# Patient Record
Sex: FEMALE | Race: WHITE | NOT HISPANIC OR LATINO | Employment: OTHER | URBAN - METROPOLITAN AREA
[De-identification: names, ages, dates, MRNs, and addresses within clinical notes are randomized per-mention and may not be internally consistent; named-entity substitution may affect disease eponyms.]

---

## 2019-04-06 ENCOUNTER — OFFICE VISIT (OUTPATIENT)
Dept: NEUROSURGERY | Facility: CLINIC | Age: 69
End: 2019-04-06

## 2019-04-06 VITALS — HEIGHT: 64 IN | WEIGHT: 174 LBS | BODY MASS INDEX: 29.71 KG/M2 | TEMPERATURE: 97.9 F

## 2019-04-06 DIAGNOSIS — M53.9 MULTILEVEL DEGENERATIVE DISC DISEASE: ICD-10-CM

## 2019-04-06 DIAGNOSIS — M54.16 LUMBAR RADICULOPATHY: Primary | ICD-10-CM

## 2019-04-06 DIAGNOSIS — M25.511 PAIN IN JOINT OF RIGHT SHOULDER: ICD-10-CM

## 2019-04-06 PROCEDURE — 99203 OFFICE O/P NEW LOW 30 MIN: CPT | Performed by: NEUROLOGICAL SURGERY

## 2019-04-06 RX ORDER — LATANOPROST 50 UG/ML
SOLUTION/ DROPS OPHTHALMIC NIGHTLY
COMMUNITY
Start: 2019-03-08 | End: 2021-10-20

## 2019-04-06 RX ORDER — OMEPRAZOLE 20 MG/1
20 CAPSULE, DELAYED RELEASE ORAL DAILY
COMMUNITY
Start: 2019-03-02 | End: 2023-01-13 | Stop reason: ALTCHOICE

## 2019-04-06 RX ORDER — BENAZEPRIL HYDROCHLORIDE 40 MG/1
40 TABLET, FILM COATED ORAL DAILY
COMMUNITY
Start: 2019-02-27

## 2019-04-06 RX ORDER — HYDROCHLOROTHIAZIDE 25 MG/1
25 TABLET ORAL DAILY
COMMUNITY
Start: 2019-03-02 | End: 2021-07-09

## 2019-04-06 RX ORDER — BACLOFEN 10 MG/1
10 TABLET ORAL 3 TIMES DAILY PRN
COMMUNITY
Start: 2018-12-31

## 2019-04-06 RX ORDER — METOPROLOL SUCCINATE 100 MG/1
100 TABLET, EXTENDED RELEASE ORAL DAILY
COMMUNITY
Start: 2019-03-02

## 2019-04-06 RX ORDER — PRAVASTATIN SODIUM 20 MG
20 TABLET ORAL DAILY
COMMUNITY
Start: 2019-02-27 | End: 2020-09-23

## 2019-04-06 RX ORDER — ASPIRIN 81 MG/1
81 TABLET ORAL DAILY
COMMUNITY
Start: 2019-01-24

## 2019-04-06 RX ORDER — AMLODIPINE BESYLATE 10 MG/1
10 TABLET ORAL DAILY
COMMUNITY
Start: 2019-03-02

## 2019-04-06 NOTE — PROGRESS NOTES
Patient: Eneida Bartholomew  : 1950    Primary Care Provider: Cari Rodriguez MD    Requesting Provider: As above        History    Chief Complaint:   1.  Back and right leg pain with sensory alteration.  2.  Right shoulder pain.  3.  History of ventriculomegaly.    History of Present Illness: Ms. Bartholomew is a 68-year-old retired  who 16 months ago awoke with right flank pain that extends into the right lateral calf.  She has been treated with ice and ibuprofen.  She has done physical therapy.  She hasseen 2 orthopedic doctors.  She has had multiple injections.  She has no left leg symptoms.  Beginning in March of this year she began experiencing pain in her jaw that extends all the way to her foot.  She complains of right shoulder pain that occurs when she uses the right arm.  She has no arm pain.  She denies bowel or bladder dysfunction although she has been recently treated for a urinary tract infection.  She has a history of ventriculomegaly.  Apparently studies were performed up to 15 years ago.  She complains of numbness in her right shin.  The worst of her symptoms involves the back and the right leg.  She has no memory or gait difficulty.    Review of Systems   Constitutional: Positive for activity change, appetite change, diaphoresis and fatigue. Negative for chills, fever and unexpected weight change.   HENT: Positive for tinnitus. Negative for congestion, dental problem, drooling, ear discharge, ear pain, facial swelling, hearing loss, mouth sores, nosebleeds, postnasal drip, rhinorrhea, sinus pressure, sneezing, sore throat, trouble swallowing and voice change.    Eyes: Positive for photophobia and itching. Negative for pain, discharge, redness and visual disturbance.   Respiratory: Negative for apnea, cough, choking, chest tightness, shortness of breath, wheezing and stridor.    Cardiovascular: Positive for leg swelling. Negative for chest pain and palpitations.  "  Gastrointestinal: Negative for abdominal distention, abdominal pain, anal bleeding, blood in stool, constipation, diarrhea, nausea, rectal pain and vomiting.   Endocrine: Positive for heat intolerance. Negative for cold intolerance, polydipsia, polyphagia and polyuria.   Genitourinary: Negative for decreased urine volume, difficulty urinating, dysuria, enuresis, flank pain, frequency, genital sores, hematuria and urgency.   Musculoskeletal: Positive for arthralgias, back pain, gait problem and myalgias. Negative for joint swelling, neck pain and neck stiffness.   Skin: Negative for color change, pallor, rash and wound.   Allergic/Immunologic: Positive for food allergies. Negative for environmental allergies and immunocompromised state.   Neurological: Positive for weakness and numbness. Negative for dizziness, tremors, seizures, syncope, facial asymmetry, speech difficulty, light-headedness and headaches.   Hematological: Negative for adenopathy. Bruises/bleeds easily.   Psychiatric/Behavioral: Negative for agitation, behavioral problems, confusion, decreased concentration, dysphoric mood, hallucinations, self-injury, sleep disturbance and suicidal ideas. The patient is not nervous/anxious and is not hyperactive.        The patient's past medical history, past surgical history, family history, and social history have been reviewed at length in the electronic medical record.    Physical Exam:   Temp 97.9 °F (36.6 °C) (Temporal)   Ht 162.6 cm (64\")   Wt 78.9 kg (174 lb)   BMI 29.87 kg/m²   CONSTITUTIONAL: Patient is well-nourished, pleasant and appears stated age.  CV: Heart regular rate and rhythm without murmur, rub, or gallop.  PULMONARY: Lungs are clear to ascultation.  MUSCULOSKELETAL:  Neck tenderness to palpation is not observed.   ROM in neck is normal.  Ranging the right shoulder induces right shoulder pain.  Straight leg raising is negative.  Pradeep signs are negative.  NEUROLOGICAL:  Orientation, " memory, attention span, language function, and cognition have been examined and are intact.  Strength is intact in the upper and lower extremities to direct testing.  Muscle tone is normal throughout.  Station and gait are methodical and mildly limping.  Sensation is intact to light touch testing throughout except in the right anterior shin where it is decreased.  Deep tendon reflexes are 2+ and symmetrical except the right knee reflex which is trace.  Luke's Sign is negative bilaterally. No clonus is elicited.  Coordination is intact.      Medical Decision Making    Data Review:   MRI of the lumbar spine dated 5/2/18 demonstrates degenerative disc disease and a generous left L2-3 disc herniation.  A new lumbar MRI dated 3/27/19 demonstrates some resolution of the above-noted L2-3 process.  There is some disc bulging into the foramen on the right at L4-5 that could compromise the L4 nerve root.  MRI of the cervical spine dated 3/27/19 demonstrates a low-grade offset of C3 on C4.  There is reversal of the normal lordosis with broad-based osteophyte at C4-5 and C5-6.  There is no cord compromise.  Brain MRI dated 3/27/19 demonstrates ventriculomegaly.    Diagnosis:   1.  Right L4 radiculopathy.  2.  Primary right shoulder dysfunction.  3.  Asymptomatic ventriculomegaly.    Treatment Options:   I am going to set up electrodiagnostic studies of the right lower extremity as well as a CT myelogram.  She may require surgical intervention on the right at L4-5.       Diagnosis Plan   1. Lumbar radiculopathy     2. Multilevel degenerative disc disease     3. Pain in joint of right shoulder             I, Dr. Borja, personally performed the services described in the documentation, as scribed in my presence, and it is both accurate and complete.  Scribed for Yoni Borja MD by Luis E Broderick CMA on 04/06/2019 at 9:44 AM

## 2019-04-08 DIAGNOSIS — M54.16 LUMBAR RADICULOPATHY: Primary | ICD-10-CM

## 2019-04-15 RX ORDER — VITAMIN E 268 MG
400 CAPSULE ORAL DAILY
COMMUNITY

## 2019-04-15 NOTE — SIGNIFICANT NOTE
Message left for pt as was unable to reach her to review history, allergies, medications or answer any questions that she may have had regarding procedure scheduled for tomorrow morning.  Left instructions regarding arrival time to registration, clear liquids after midnight,  needed, and to bring medications with her.

## 2019-04-16 ENCOUNTER — HOSPITAL ENCOUNTER (OUTPATIENT)
Dept: CT IMAGING | Facility: HOSPITAL | Age: 69
Discharge: HOME | End: 2019-04-16

## 2019-04-16 ENCOUNTER — OFFICE VISIT (OUTPATIENT)
Dept: NEUROSURGERY | Facility: CLINIC | Age: 69
End: 2019-04-16

## 2019-04-16 ENCOUNTER — HOSPITAL ENCOUNTER (OUTPATIENT)
Dept: NEUROLOGY | Facility: HOSPITAL | Age: 69
Discharge: HOME | End: 2019-04-16
Admitting: NEUROLOGICAL SURGERY

## 2019-04-16 ENCOUNTER — HOSPITAL ENCOUNTER (OUTPATIENT)
Dept: GENERAL RADIOLOGY | Facility: HOSPITAL | Age: 69
Discharge: HOME | End: 2019-04-16

## 2019-04-16 ENCOUNTER — PREP FOR SURGERY (OUTPATIENT)
Dept: OTHER | Facility: HOSPITAL | Age: 69
End: 2019-04-16

## 2019-04-16 VITALS
TEMPERATURE: 98 F | HEIGHT: 64 IN | OXYGEN SATURATION: 97 % | DIASTOLIC BLOOD PRESSURE: 66 MMHG | HEART RATE: 77 BPM | BODY MASS INDEX: 29.71 KG/M2 | WEIGHT: 174 LBS | RESPIRATION RATE: 18 BRPM | SYSTOLIC BLOOD PRESSURE: 123 MMHG

## 2019-04-16 VITALS — WEIGHT: 174 LBS | HEIGHT: 64 IN | BODY MASS INDEX: 29.71 KG/M2 | TEMPERATURE: 98 F

## 2019-04-16 DIAGNOSIS — M48.062 LUMBAR STENOSIS WITH NEUROGENIC CLAUDICATION: Primary | ICD-10-CM

## 2019-04-16 DIAGNOSIS — M54.16 LUMBAR RADICULOPATHY: ICD-10-CM

## 2019-04-16 DIAGNOSIS — M54.16 LUMBAR RADICULOPATHY: Primary | ICD-10-CM

## 2019-04-16 DIAGNOSIS — M53.9 MULTILEVEL DEGENERATIVE DISC DISEASE: ICD-10-CM

## 2019-04-16 PROCEDURE — 62304 MYELOGRAPHY LUMBAR INJECTION: CPT

## 2019-04-16 PROCEDURE — 99214 OFFICE O/P EST MOD 30 MIN: CPT | Performed by: NEUROLOGICAL SURGERY

## 2019-04-16 PROCEDURE — 95886 MUSC TEST DONE W/N TEST COMP: CPT

## 2019-04-16 PROCEDURE — 95909 NRV CNDJ TST 5-6 STUDIES: CPT

## 2019-04-16 PROCEDURE — 72240 MYELOGRAPHY NECK SPINE: CPT

## 2019-04-16 PROCEDURE — 72120 X-RAY BEND ONLY L-S SPINE: CPT

## 2019-04-16 PROCEDURE — 0 IOPAMIDOL 41 % SOLUTION: Performed by: NEUROLOGICAL SURGERY

## 2019-04-16 PROCEDURE — 72132 CT LUMBAR SPINE W/DYE: CPT

## 2019-04-16 RX ORDER — LIDOCAINE HYDROCHLORIDE 10 MG/ML
5 INJECTION, SOLUTION INFILTRATION; PERINEURAL ONCE
Status: COMPLETED | OUTPATIENT
Start: 2019-04-16 | End: 2019-04-16

## 2019-04-16 RX ORDER — OXYCODONE HCL 10 MG/1
10 TABLET, FILM COATED, EXTENDED RELEASE ORAL ONCE
Status: CANCELLED | OUTPATIENT
Start: 2019-04-16 | End: 2019-04-16

## 2019-04-16 RX ORDER — CEFAZOLIN SODIUM 2 G/100ML
2 INJECTION, SOLUTION INTRAVENOUS ONCE
Status: CANCELLED | OUTPATIENT
Start: 2019-04-16 | End: 2019-04-16

## 2019-04-16 RX ORDER — IBUPROFEN 800 MG/1
800 TABLET ORAL ONCE
Status: CANCELLED | OUTPATIENT
Start: 2019-04-16 | End: 2019-04-16

## 2019-04-16 RX ORDER — ACETAMINOPHEN 500 MG
1000 TABLET ORAL ONCE
Status: CANCELLED | OUTPATIENT
Start: 2019-04-16 | End: 2019-04-16

## 2019-04-16 RX ORDER — PROMETHAZINE HYDROCHLORIDE 25 MG/ML
12.5 INJECTION, SOLUTION INTRAMUSCULAR; INTRAVENOUS ONCE AS NEEDED
Status: DISCONTINUED | OUTPATIENT
Start: 2019-04-16 | End: 2019-04-17 | Stop reason: HOSPADM

## 2019-04-16 RX ORDER — ONDANSETRON 4 MG/1
4 TABLET, FILM COATED ORAL ONCE AS NEEDED
Status: DISCONTINUED | OUTPATIENT
Start: 2019-04-16 | End: 2019-04-17 | Stop reason: HOSPADM

## 2019-04-16 RX ORDER — PROMETHAZINE HYDROCHLORIDE 25 MG/1
25 TABLET ORAL ONCE AS NEEDED
Status: DISCONTINUED | OUTPATIENT
Start: 2019-04-16 | End: 2019-04-17 | Stop reason: HOSPADM

## 2019-04-16 RX ORDER — HYDROCODONE BITARTRATE AND ACETAMINOPHEN 7.5; 325 MG/1; MG/1
1 TABLET ORAL 2 TIMES DAILY PRN
Qty: 50 TABLET | Refills: 0 | Status: SHIPPED | OUTPATIENT
Start: 2019-04-16 | End: 2019-05-05 | Stop reason: HOSPADM

## 2019-04-16 RX ORDER — FAMOTIDINE 20 MG/1
20 TABLET, FILM COATED ORAL
Status: CANCELLED | OUTPATIENT
Start: 2019-04-16

## 2019-04-16 RX ADMIN — LIDOCAINE HYDROCHLORIDE 5 ML: 10 INJECTION, SOLUTION INFILTRATION; PERINEURAL at 07:14

## 2019-04-16 RX ADMIN — IOPAMIDOL 20 ML: 408 INJECTION, SOLUTION INTRATHECAL at 07:15

## 2019-04-16 NOTE — PROGRESS NOTES
Patient: Eneida Bartholomew  : 1950    Primary Care Provider: Cari Rodriguez MD    Requesting Provider: As above        History    Chief Complaint:   1.  Back and right leg pain with sensory alteration.  2.  Right shoulder pain.  3.  History of ventriculomegaly.    History of Present Illness: Ms. Bartholomew is a 68-year-old retired  who 16 months ago awoke with right flank pain that extends into the right lateral calf.  She has been treated with ice and ibuprofen.  She has done physical therapy.  She hasseen 2 orthopedic doctors.  She has had multiple injections.  She has no left leg symptoms.  Beginning in March of this year she began experiencing pain in her jaw that extends all the way to her foot.  She complains of right shoulder pain that occurs when she uses the right arm.  She has no arm pain.  She denies bowel or bladder dysfunction although she has been recently treated for a urinary tract infection.  She has a history of ventriculomegaly.  Apparently studies were performed up to 15 years ago.  She complains of numbness in her right shin.  The worst of her symptoms involves the back and the right leg.    Symptoms are worse when up on her feet.  She has no memory or gait difficulty.    Review of Systems   Constitutional: Positive for activity change, appetite change, diaphoresis and fatigue. Negative for chills, fever and unexpected weight change.   HENT: Positive for tinnitus. Negative for congestion, dental problem, drooling, ear discharge, ear pain, facial swelling, hearing loss, mouth sores, nosebleeds, postnasal drip, rhinorrhea, sinus pressure, sneezing, sore throat, trouble swallowing and voice change.    Eyes: Positive for photophobia and itching. Negative for pain, discharge, redness and visual disturbance.   Respiratory: Negative for apnea, cough, choking, chest tightness, shortness of breath, wheezing and stridor.    Cardiovascular: Positive for leg swelling.  "Negative for chest pain and palpitations.   Gastrointestinal: Negative for abdominal distention, abdominal pain, anal bleeding, blood in stool, constipation, diarrhea, nausea, rectal pain and vomiting.   Endocrine: Positive for heat intolerance. Negative for cold intolerance, polydipsia, polyphagia and polyuria.   Genitourinary: Negative for decreased urine volume, difficulty urinating, dysuria, enuresis, flank pain, frequency, genital sores, hematuria and urgency.   Musculoskeletal: Positive for arthralgias, back pain, gait problem and myalgias. Negative for joint swelling, neck pain and neck stiffness.   Skin: Negative for color change, pallor, rash and wound.   Allergic/Immunologic: Positive for food allergies. Negative for environmental allergies and immunocompromised state.   Neurological: Positive for weakness and numbness. Negative for dizziness, tremors, seizures, syncope, facial asymmetry, speech difficulty, light-headedness and headaches.   Hematological: Negative for adenopathy. Bruises/bleeds easily.   Psychiatric/Behavioral: Negative for agitation, behavioral problems, confusion, decreased concentration, dysphoric mood, hallucinations, self-injury, sleep disturbance and suicidal ideas. The patient is not nervous/anxious and is not hyperactive.        The patient's past medical history, past surgical history, family history, and social history have been reviewed at length in the electronic medical record.    Physical Exam:   Temp 98 °F (36.7 °C) (Temporal)   Ht 162.6 cm (64\")   Wt 78.9 kg (174 lb)   BMI 29.87 kg/m²   MUSCULOSKELETAL:  Straight leg raising is negative.  Pradeep's Sign is negative.  ROM in back normal.  Tenderness in the back to palpation is not observed.  NEUROLOGICAL:  Strength is intact in the lower extremities to direct testing.  Muscle tone is normal throughout.  Station and gait are normal.  Right knee reflex is trace.  Sensation is diminished to light touch testing in the right " anterior shin.      Medical Decision Making    Data Review:   MRI of the lumbar spine dated 5/2/18 demonstrates degenerative disc disease and a generous left L2-3 disc herniation.  A new lumbar MRI dated 3/27/19 demonstrates some resolution of the above-noted L2-3 process.  There is some disc bulging into the foramen on the right at L4-5 that could compromise the L4 nerve root.  MRI of the cervical spine dated 3/27/19 demonstrates a low-grade offset of C3 on C4.  There is reversal of the normal lordosis with broad-based osteophyte at C4-5 and C5-6.  There is no cord compromise.  Brain MRI dated 3/27/19 demonstrates ventriculomegaly.    Electrodiagnostic studies suggest a chronic L4 radiculopathy on the right.    CT myelogram demonstrates some scalloping on the right at L3-4 and L4-5.  There is recess narrowing on the right at L3-4.  There is substantial disc space collapse at L4-5 with by foraminal narrowing, right greater than left.    Diagnosis:   The patient's main complaint is right leg pain that likely reflects an L4 radiculopathy.  This could be emanating from the L3-4 and/or L4-5 level (foraminally).    Treatment Options:   Given her severe symptoms I have recommended L4-5 PLIF as well as right L3-4 laminotomy and foraminotomy.  This will likely help with her symptoms although it may not completely eradicate them.       Diagnosis Plan   1. Lumbar radiculopathy     2. Multilevel degenerative disc disease         Scribed for Yoni Borja MD by Vernell Becker CMA on 04/16/2019 at 4:57 PM      I, Dr. Borja, personally performed the services described in the documentation, as scribed in my presence, and it is both accurate and complete.

## 2019-04-16 NOTE — POST-PROCEDURE NOTE
MYELOGRAM PROCEDURE NOTE  Neurosurgery    Patient: Eneida Bartholomew  : 1950      PreOp Diagnosis: Lumbar radiculopathy    PostOp Diagnosis: Same    Procedure: Lumbar myelogram    Surgeon: Jonh    Anesthesia: 1% lidocaine    Technique:   Spinal needle: 22 gauge   Contrast: Isovue 200 (20ml)   Injection site: R L3    EBL: Trace    Specimens removed: None    Complication: None        Yoni Borja MD  19  7:27 AM

## 2019-04-16 NOTE — H&P
Patient: Eneida Bartholomew  : 1950     Primary Care Provider: Cari Rodriguez MD     Requesting Provider: As above           History     Chief Complaint:   1.  Back and right leg pain with sensory alteration.  2.  Right shoulder pain.  3.  History of ventriculomegaly.     History of Present Illness: Ms. Bartholomew is a 68-year-old retired  who 16 months ago awoke with right flank pain that extends into the right lateral calf.  She has been treated with ice and ibuprofen.  She has done physical therapy.  She hasseen 2 orthopedic doctors.  She has had multiple injections.  She has no left leg symptoms.  Beginning in March of this year she began experiencing pain in her jaw that extends all the way to her foot.  She complains of right shoulder pain that occurs when she uses the right arm.  She has no arm pain.  She denies bowel or bladder dysfunction although she has been recently treated for a urinary tract infection.  She has a history of ventriculomegaly.  Apparently studies were performed up to 15 years ago.  She complains of numbness in her right shin.  The worst of her symptoms involves the back and the right leg.    Symptoms are worse when up on her feet.  She has no memory or gait difficulty.     Review of Systems   Constitutional: Positive for activity change, appetite change, diaphoresis and fatigue. Negative for chills, fever and unexpected weight change.   HENT: Positive for tinnitus. Negative for congestion, dental problem, drooling, ear discharge, ear pain, facial swelling, hearing loss, mouth sores, nosebleeds, postnasal drip, rhinorrhea, sinus pressure, sneezing, sore throat, trouble swallowing and voice change.    Eyes: Positive for photophobia and itching. Negative for pain, discharge, redness and visual disturbance.   Respiratory: Negative for apnea, cough, choking, chest tightness, shortness of breath, wheezing and stridor.    Cardiovascular: Positive for leg  swelling. Negative for chest pain and palpitations.   Gastrointestinal: Negative for abdominal distention, abdominal pain, anal bleeding, blood in stool, constipation, diarrhea, nausea, rectal pain and vomiting.   Endocrine: Positive for heat intolerance. Negative for cold intolerance, polydipsia, polyphagia and polyuria.   Genitourinary: Negative for decreased urine volume, difficulty urinating, dysuria, enuresis, flank pain, frequency, genital sores, hematuria and urgency.   Musculoskeletal: Positive for arthralgias, back pain, gait problem and myalgias. Negative for joint swelling, neck pain and neck stiffness.   Skin: Negative for color change, pallor, rash and wound.   Allergic/Immunologic: Positive for food allergies. Negative for environmental allergies and immunocompromised state.   Neurological: Positive for weakness and numbness. Negative for dizziness, tremors, seizures, syncope, facial asymmetry, speech difficulty, light-headedness and headaches.   Hematological: Negative for adenopathy. Bruises/bleeds easily.   Psychiatric/Behavioral: Negative for agitation, behavioral problems, confusion, decreased concentration, dysphoric mood, hallucinations, self-injury, sleep disturbance and suicidal ideas. The patient is not nervous/anxious and is not hyperactive.          The patient's past medical history, past surgical history, family history, and social history have been reviewed at length in the electronic medical record.     Past Medical History:   Diagnosis Date   • Arthritis    • Coronary artery disease    • GERD (gastroesophageal reflux disease)    • Glaucoma    • Hypertension      Past Surgical History:   Procedure Laterality Date   • BREAST BIOPSY Right    • BUNIONECTOMY Bilateral     x4   • CATARACT EXTRACTION EXTRACAPSULAR W/ INTRAOCULAR LENS IMPLANTATION Bilateral    • CHOLECYSTECTOMY     • CORONARY ANGIOPLASTY WITH STENT PLACEMENT       Family History   Problem Relation Age of Onset   • Cancer  "Mother    • Heart disease Father      Social History     Socioeconomic History   • Marital status:      Spouse name: Claude   • Number of children: Not on file   • Years of education: Not on file   • Highest education level: Not on file   Tobacco Use   • Smoking status: Never Smoker   • Smokeless tobacco: Never Used   Substance and Sexual Activity   • Alcohol use: No     Frequency: Never   • Drug use: No   • Sexual activity: Defer   Social History Narrative    Sister, Cira will be driving.    Pt lives in [place] with spouse     Allergies   Allergen Reactions   • Prednisone Shortness Of Breath     And nervous   • Erythromycin Rash   • Sulfa Antibiotics Rash       Physical Exam:   Temp 98 °F (36.7 °C) (Temporal)   Ht 162.6 cm (64\")   Wt 78.9 kg (174 lb)   BMI 29.87 kg/m²   MUSCULOSKELETAL:  Straight leg raising is negative.  Pradeep's Sign is negative.  ROM in back normal.  Tenderness in the back to palpation is not observed.  NEUROLOGICAL:  Strength is intact in the lower extremities to direct testing.  Muscle tone is normal throughout.  Station and gait are normal.  Right knee reflex is trace.  Sensation is diminished to light touch testing in the right anterior shin.        Medical Decision Making     Data Review:   MRI of the lumbar spine dated 5/2/18 demonstrates degenerative disc disease and a generous left L2-3 disc herniation.  A new lumbar MRI dated 3/27/19 demonstrates some resolution of the above-noted L2-3 process.  There is some disc bulging into the foramen on the right at L4-5 that could compromise the L4 nerve root.  MRI of the cervical spine dated 3/27/19 demonstrates a low-grade offset of C3 on C4.  There is reversal of the normal lordosis with broad-based osteophyte at C4-5 and C5-6.  There is no cord compromise.  Brain MRI dated 3/27/19 demonstrates ventriculomegaly.     Electrodiagnostic studies suggest a chronic L4 radiculopathy on the right.     CT myelogram demonstrates some " scalloping on the right at L3-4 and L4-5.  There is recess narrowing on the right at L3-4.  There is substantial disc space collapse at L4-5 with by foraminal narrowing, right greater than left.     Diagnosis:   The patient's main complaint is right leg pain that likely reflects an L4 radiculopathy.  This could be emanating from the L3-4 and/or L4-5 level (foraminally).     Treatment Options:   Given her severe symptoms I have recommended L4-5 PLIF as well as right L3-4 laminotomy and foraminotomy.  This will likely help with her symptoms although it may not completely eradicate them.          Diagnosis Plan   1. Lumbar radiculopathy      2. Multilevel degenerative disc disease

## 2019-04-17 ENCOUNTER — TELEPHONE (OUTPATIENT)
Dept: INFUSION THERAPY | Facility: HOSPITAL | Age: 69
End: 2019-04-17

## 2019-04-17 PROBLEM — M48.062 LUMBAR STENOSIS WITH NEUROGENIC CLAUDICATION: Status: ACTIVE | Noted: 2019-04-17

## 2019-04-29 ENCOUNTER — APPOINTMENT (OUTPATIENT)
Dept: PREADMISSION TESTING | Facility: HOSPITAL | Age: 69
End: 2019-04-29

## 2019-04-29 VITALS — WEIGHT: 173.28 LBS | HEIGHT: 64 IN | BODY MASS INDEX: 29.58 KG/M2

## 2019-04-29 LAB
DEPRECATED RDW RBC AUTO: 39.9 FL (ref 37–54)
ERYTHROCYTE [DISTWIDTH] IN BLOOD BY AUTOMATED COUNT: 12.6 % (ref 12.3–15.4)
HBA1C MFR BLD: 5 % (ref 4.8–5.6)
HCT VFR BLD AUTO: 41.1 % (ref 34–46.6)
HGB BLD-MCNC: 13.8 G/DL (ref 12–15.9)
MCH RBC QN AUTO: 29.7 PG (ref 26.6–33)
MCHC RBC AUTO-ENTMCNC: 33.6 G/DL (ref 31.5–35.7)
MCV RBC AUTO: 88.6 FL (ref 79–97)
MRSA DNA SPEC QL NAA+PROBE: NEGATIVE
PLATELET # BLD AUTO: 241 10*3/MM3 (ref 140–450)
PMV BLD AUTO: 9.4 FL (ref 6–12)
POTASSIUM BLD-SCNC: 3.6 MMOL/L (ref 3.5–5.2)
RBC # BLD AUTO: 4.64 10*6/MM3 (ref 3.77–5.28)
WBC NRBC COR # BLD: 5.45 10*3/MM3 (ref 3.4–10.8)

## 2019-04-29 PROCEDURE — 87641 MR-STAPH DNA AMP PROBE: CPT | Performed by: ANESTHESIOLOGY

## 2019-04-29 PROCEDURE — 83036 HEMOGLOBIN GLYCOSYLATED A1C: CPT | Performed by: ANESTHESIOLOGY

## 2019-04-29 PROCEDURE — 85027 COMPLETE CBC AUTOMATED: CPT | Performed by: ANESTHESIOLOGY

## 2019-04-29 PROCEDURE — 36415 COLL VENOUS BLD VENIPUNCTURE: CPT

## 2019-04-29 PROCEDURE — 93005 ELECTROCARDIOGRAM TRACING: CPT

## 2019-04-29 PROCEDURE — 93010 ELECTROCARDIOGRAM REPORT: CPT | Performed by: INTERNAL MEDICINE

## 2019-04-29 PROCEDURE — 84132 ASSAY OF SERUM POTASSIUM: CPT | Performed by: ANESTHESIOLOGY

## 2019-05-02 ENCOUNTER — ANESTHESIA EVENT (OUTPATIENT)
Dept: PERIOP | Facility: HOSPITAL | Age: 69
End: 2019-05-02

## 2019-05-02 ENCOUNTER — APPOINTMENT (OUTPATIENT)
Dept: GENERAL RADIOLOGY | Facility: HOSPITAL | Age: 69
End: 2019-05-02

## 2019-05-02 ENCOUNTER — HOSPITAL ENCOUNTER (INPATIENT)
Facility: HOSPITAL | Age: 69
LOS: 3 days | Discharge: HOME | End: 2019-05-05
Attending: NEUROLOGICAL SURGERY | Admitting: NEUROLOGICAL SURGERY

## 2019-05-02 ENCOUNTER — ANESTHESIA (OUTPATIENT)
Dept: PERIOP | Facility: HOSPITAL | Age: 69
End: 2019-05-02

## 2019-05-02 DIAGNOSIS — Z74.09 IMPAIRED FUNCTIONAL MOBILITY, BALANCE, GAIT, AND ENDURANCE: Primary | ICD-10-CM

## 2019-05-02 DIAGNOSIS — Z78.9 IMPAIRED MOBILITY AND ADLS: ICD-10-CM

## 2019-05-02 DIAGNOSIS — Z74.09 IMPAIRED MOBILITY AND ADLS: ICD-10-CM

## 2019-05-02 DIAGNOSIS — M48.062 LUMBAR STENOSIS WITH NEUROGENIC CLAUDICATION: ICD-10-CM

## 2019-05-02 PROCEDURE — 25010000002 PHENYLEPHRINE PER 1 ML: Performed by: NURSE ANESTHETIST, CERTIFIED REGISTERED

## 2019-05-02 PROCEDURE — 22633 ARTHRD CMBN 1NTRSPC LUMBAR: CPT | Performed by: NEUROLOGICAL SURGERY

## 2019-05-02 PROCEDURE — C1713 ANCHOR/SCREW BN/BN,TIS/BN: HCPCS | Performed by: NEUROLOGICAL SURGERY

## 2019-05-02 PROCEDURE — 25010000002 HYDROMORPHONE PER 4 MG: Performed by: ANESTHESIOLOGY

## 2019-05-02 PROCEDURE — 25010000002 NEOSTIGMINE 10 MG/10ML SOLUTION: Performed by: ANESTHESIOLOGY

## 2019-05-02 PROCEDURE — 25010000002 PROPOFOL 10 MG/ML EMULSION: Performed by: NURSE ANESTHETIST, CERTIFIED REGISTERED

## 2019-05-02 PROCEDURE — 0SB20ZZ EXCISION OF LUMBAR VERTEBRAL DISC, OPEN APPROACH: ICD-10-PCS | Performed by: NEUROLOGICAL SURGERY

## 2019-05-02 PROCEDURE — 63047 LAM FACETEC & FORAMOT LUMBAR: CPT | Performed by: PHYSICIAN ASSISTANT

## 2019-05-02 PROCEDURE — 25010000002 FENTANYL CITRATE (PF) 100 MCG/2ML SOLUTION: Performed by: NURSE ANESTHETIST, CERTIFIED REGISTERED

## 2019-05-02 PROCEDURE — 25010000003 CEFAZOLIN IN DEXTROSE 2-4 GM/100ML-% SOLUTION: Performed by: NEUROLOGICAL SURGERY

## 2019-05-02 PROCEDURE — 63047 LAM FACETEC & FORAMOT LUMBAR: CPT | Performed by: NEUROLOGICAL SURGERY

## 2019-05-02 PROCEDURE — 25010000002 VANCOMYCIN 1 G RECONSTITUTED SOLUTION: Performed by: NEUROLOGICAL SURGERY

## 2019-05-02 PROCEDURE — 22633 ARTHRD CMBN 1NTRSPC LUMBAR: CPT | Performed by: PHYSICIAN ASSISTANT

## 2019-05-02 PROCEDURE — 22853 INSJ BIOMECHANICAL DEVICE: CPT | Performed by: PHYSICIAN ASSISTANT

## 2019-05-02 PROCEDURE — 22840 INSERT SPINE FIXATION DEVICE: CPT | Performed by: NEUROLOGICAL SURGERY

## 2019-05-02 PROCEDURE — 22840 INSERT SPINE FIXATION DEVICE: CPT | Performed by: PHYSICIAN ASSISTANT

## 2019-05-02 PROCEDURE — 76000 FLUOROSCOPY <1 HR PHYS/QHP: CPT

## 2019-05-02 PROCEDURE — 25010000002 DEXAMETHASONE PER 1 MG: Performed by: ANESTHESIOLOGY

## 2019-05-02 PROCEDURE — 22853 INSJ BIOMECHANICAL DEVICE: CPT | Performed by: NEUROLOGICAL SURGERY

## 2019-05-02 PROCEDURE — 0SG00AJ FUSION OF LUMBAR VERTEBRAL JOINT WITH INTERBODY FUSION DEVICE, POSTERIOR APPROACH, ANTERIOR COLUMN, OPEN APPROACH: ICD-10-PCS | Performed by: NEUROLOGICAL SURGERY

## 2019-05-02 PROCEDURE — 00QT0ZZ REPAIR SPINAL MENINGES, OPEN APPROACH: ICD-10-PCS | Performed by: NEUROLOGICAL SURGERY

## 2019-05-02 DEVICE — CDH 7226545 LEGACY PEEK  6.5X45
Type: IMPLANTABLE DEVICE | Site: SPINE LUMBAR | Status: FUNCTIONAL
Brand: CD HORIZON® SPINAL SYSTEM

## 2019-05-02 DEVICE — DURAGEN® PLUS DURAL REGENERATION MATRIX, 1 IN X 3 IN (2.5 CM X 7.5 CM)
Type: IMPLANTABLE DEVICE | Site: SPINE LUMBAR | Status: FUNCTIONAL
Brand: DURAGEN® PLUS

## 2019-05-02 DEVICE — SPACER 2990822 CAPSTONE PEEK 08X22
Type: IMPLANTABLE DEVICE | Site: SPINE LUMBAR | Status: FUNCTIONAL
Brand: CAPSTONE® SPINAL SYSTEM

## 2019-05-02 DEVICE — DBM T44145 5CC ORTHOBLEND SMALL DEFGRAFT
Type: IMPLANTABLE DEVICE | Site: SPINE LUMBAR | Status: FUNCTIONAL
Brand: GRAFTON®AND GRAFTON PLUS®DEMINERALIZED BONE MATRIX (DBM)

## 2019-05-02 RX ORDER — SENNA AND DOCUSATE SODIUM 50; 8.6 MG/1; MG/1
1 TABLET, FILM COATED ORAL NIGHTLY PRN
Status: DISCONTINUED | OUTPATIENT
Start: 2019-05-02 | End: 2019-05-05 | Stop reason: HOSPADM

## 2019-05-02 RX ORDER — NEOSTIGMINE METHYLSULFATE 1 MG/ML
INJECTION, SOLUTION INTRAVENOUS AS NEEDED
Status: DISCONTINUED | OUTPATIENT
Start: 2019-05-02 | End: 2019-05-02 | Stop reason: SURG

## 2019-05-02 RX ORDER — FAMOTIDINE 10 MG/ML
20 INJECTION, SOLUTION INTRAVENOUS ONCE
Status: CANCELLED | OUTPATIENT
Start: 2019-05-02 | End: 2019-05-02

## 2019-05-02 RX ORDER — DEXAMETHASONE SODIUM PHOSPHATE 4 MG/ML
INJECTION, SOLUTION INTRA-ARTICULAR; INTRALESIONAL; INTRAMUSCULAR; INTRAVENOUS; SOFT TISSUE AS NEEDED
Status: DISCONTINUED | OUTPATIENT
Start: 2019-05-02 | End: 2019-05-02 | Stop reason: SURG

## 2019-05-02 RX ORDER — HYDROCHLOROTHIAZIDE 25 MG/1
25 TABLET ORAL DAILY
Status: DISCONTINUED | OUTPATIENT
Start: 2019-05-03 | End: 2019-05-05 | Stop reason: HOSPADM

## 2019-05-02 RX ORDER — PRAVASTATIN SODIUM 20 MG
20 TABLET ORAL DAILY
Status: DISCONTINUED | OUTPATIENT
Start: 2019-05-02 | End: 2019-05-05 | Stop reason: HOSPADM

## 2019-05-02 RX ORDER — AMLODIPINE BESYLATE 10 MG/1
10 TABLET ORAL DAILY
Status: DISCONTINUED | OUTPATIENT
Start: 2019-05-03 | End: 2019-05-05 | Stop reason: HOSPADM

## 2019-05-02 RX ORDER — SODIUM CHLORIDE 9 MG/ML
INJECTION, SOLUTION INTRAVENOUS AS NEEDED
Status: DISCONTINUED | OUTPATIENT
Start: 2019-05-02 | End: 2019-05-02 | Stop reason: HOSPADM

## 2019-05-02 RX ORDER — OXYCODONE HYDROCHLORIDE 15 MG/1
15 TABLET, FILM COATED, EXTENDED RELEASE ORAL EVERY 12 HOURS SCHEDULED
Status: DISCONTINUED | OUTPATIENT
Start: 2019-05-02 | End: 2019-05-05 | Stop reason: HOSPADM

## 2019-05-02 RX ORDER — ASPIRIN 81 MG/1
81 TABLET ORAL DAILY
Status: DISCONTINUED | OUTPATIENT
Start: 2019-05-02 | End: 2019-05-05 | Stop reason: HOSPADM

## 2019-05-02 RX ORDER — SODIUM CHLORIDE 0.9 % (FLUSH) 0.9 %
3-10 SYRINGE (ML) INJECTION AS NEEDED
Status: CANCELLED | OUTPATIENT
Start: 2019-05-02

## 2019-05-02 RX ORDER — SODIUM CHLORIDE, SODIUM LACTATE, POTASSIUM CHLORIDE, CALCIUM CHLORIDE 600; 310; 30; 20 MG/100ML; MG/100ML; MG/100ML; MG/100ML
90 INJECTION, SOLUTION INTRAVENOUS CONTINUOUS
Status: DISCONTINUED | OUTPATIENT
Start: 2019-05-02 | End: 2019-05-04

## 2019-05-02 RX ORDER — IBUPROFEN 600 MG/1
600 TABLET ORAL 3 TIMES DAILY
Status: DISCONTINUED | OUTPATIENT
Start: 2019-05-02 | End: 2019-05-05 | Stop reason: HOSPADM

## 2019-05-02 RX ORDER — PROMETHAZINE HYDROCHLORIDE 12.5 MG/1
12.5 SUPPOSITORY RECTAL EVERY 6 HOURS PRN
Status: DISCONTINUED | OUTPATIENT
Start: 2019-05-02 | End: 2019-05-05 | Stop reason: HOSPADM

## 2019-05-02 RX ORDER — ONDANSETRON 2 MG/ML
4 INJECTION INTRAMUSCULAR; INTRAVENOUS EVERY 6 HOURS PRN
Status: DISCONTINUED | OUTPATIENT
Start: 2019-05-02 | End: 2019-05-05 | Stop reason: HOSPADM

## 2019-05-02 RX ORDER — SODIUM CHLORIDE 0.9 % (FLUSH) 0.9 %
1-10 SYRINGE (ML) INJECTION AS NEEDED
Status: DISCONTINUED | OUTPATIENT
Start: 2019-05-02 | End: 2019-05-05 | Stop reason: HOSPADM

## 2019-05-02 RX ORDER — BACLOFEN 10 MG/1
10 TABLET ORAL 3 TIMES DAILY PRN
Status: DISCONTINUED | OUTPATIENT
Start: 2019-05-02 | End: 2019-05-05 | Stop reason: HOSPADM

## 2019-05-02 RX ORDER — MORPHINE SULFATE 2 MG/ML
2 INJECTION, SOLUTION INTRAMUSCULAR; INTRAVENOUS EVERY 4 HOURS PRN
Status: DISCONTINUED | OUTPATIENT
Start: 2019-05-02 | End: 2019-05-05 | Stop reason: HOSPADM

## 2019-05-02 RX ORDER — LATANOPROST 50 UG/ML
1 SOLUTION/ DROPS OPHTHALMIC NIGHTLY
Status: DISCONTINUED | OUTPATIENT
Start: 2019-05-02 | End: 2019-05-05 | Stop reason: HOSPADM

## 2019-05-02 RX ORDER — VANCOMYCIN HYDROCHLORIDE 1 G/20ML
INJECTION, POWDER, LYOPHILIZED, FOR SOLUTION INTRAVENOUS AS NEEDED
Status: DISCONTINUED | OUTPATIENT
Start: 2019-05-02 | End: 2019-05-02 | Stop reason: HOSPADM

## 2019-05-02 RX ORDER — ACETAMINOPHEN 325 MG/1
650 TABLET ORAL 3 TIMES DAILY
Status: DISCONTINUED | OUTPATIENT
Start: 2019-05-02 | End: 2019-05-05 | Stop reason: HOSPADM

## 2019-05-02 RX ORDER — OXYCODONE HCL 10 MG/1
10 TABLET, FILM COATED, EXTENDED RELEASE ORAL ONCE
Status: COMPLETED | OUTPATIENT
Start: 2019-05-02 | End: 2019-05-02

## 2019-05-02 RX ORDER — SODIUM CHLORIDE, SODIUM LACTATE, POTASSIUM CHLORIDE, CALCIUM CHLORIDE 600; 310; 30; 20 MG/100ML; MG/100ML; MG/100ML; MG/100ML
9 INJECTION, SOLUTION INTRAVENOUS CONTINUOUS
Status: DISCONTINUED | OUTPATIENT
Start: 2019-05-02 | End: 2019-05-05 | Stop reason: HOSPADM

## 2019-05-02 RX ORDER — BISACODYL 10 MG
10 SUPPOSITORY, RECTAL RECTAL DAILY PRN
Status: DISCONTINUED | OUTPATIENT
Start: 2019-05-02 | End: 2019-05-05 | Stop reason: HOSPADM

## 2019-05-02 RX ORDER — DOCUSATE SODIUM 100 MG/1
100 CAPSULE, LIQUID FILLED ORAL 2 TIMES DAILY PRN
Status: DISCONTINUED | OUTPATIENT
Start: 2019-05-02 | End: 2019-05-05 | Stop reason: HOSPADM

## 2019-05-02 RX ORDER — BUPIVACAINE HYDROCHLORIDE AND EPINEPHRINE 2.5; 5 MG/ML; UG/ML
INJECTION, SOLUTION EPIDURAL; INFILTRATION; INTRACAUDAL; PERINEURAL AS NEEDED
Status: DISCONTINUED | OUTPATIENT
Start: 2019-05-02 | End: 2019-05-02 | Stop reason: HOSPADM

## 2019-05-02 RX ORDER — SODIUM CHLORIDE 0.9 % (FLUSH) 0.9 %
3 SYRINGE (ML) INJECTION EVERY 12 HOURS SCHEDULED
Status: DISCONTINUED | OUTPATIENT
Start: 2019-05-02 | End: 2019-05-05 | Stop reason: HOSPADM

## 2019-05-02 RX ORDER — PANTOPRAZOLE SODIUM 40 MG/1
40 TABLET, DELAYED RELEASE ORAL EVERY MORNING
Status: DISCONTINUED | OUTPATIENT
Start: 2019-05-03 | End: 2019-05-05 | Stop reason: HOSPADM

## 2019-05-02 RX ORDER — SODIUM CHLORIDE 0.9 % (FLUSH) 0.9 %
3 SYRINGE (ML) INJECTION EVERY 12 HOURS SCHEDULED
Status: CANCELLED | OUTPATIENT
Start: 2019-05-02

## 2019-05-02 RX ORDER — FENTANYL CITRATE 50 UG/ML
INJECTION, SOLUTION INTRAMUSCULAR; INTRAVENOUS AS NEEDED
Status: DISCONTINUED | OUTPATIENT
Start: 2019-05-02 | End: 2019-05-02 | Stop reason: SURG

## 2019-05-02 RX ORDER — ROCURONIUM BROMIDE 10 MG/ML
INJECTION, SOLUTION INTRAVENOUS AS NEEDED
Status: DISCONTINUED | OUTPATIENT
Start: 2019-05-02 | End: 2019-05-02 | Stop reason: SURG

## 2019-05-02 RX ORDER — LIDOCAINE HYDROCHLORIDE 10 MG/ML
INJECTION, SOLUTION EPIDURAL; INFILTRATION; INTRACAUDAL; PERINEURAL AS NEEDED
Status: DISCONTINUED | OUTPATIENT
Start: 2019-05-02 | End: 2019-05-02 | Stop reason: SURG

## 2019-05-02 RX ORDER — FAMOTIDINE 20 MG/1
20 TABLET, FILM COATED ORAL
Status: COMPLETED | OUTPATIENT
Start: 2019-05-02 | End: 2019-05-02

## 2019-05-02 RX ORDER — DIPHENHYDRAMINE HCL 25 MG
25 CAPSULE ORAL NIGHTLY PRN
Status: DISCONTINUED | OUTPATIENT
Start: 2019-05-02 | End: 2019-05-05 | Stop reason: HOSPADM

## 2019-05-02 RX ORDER — IBUPROFEN 800 MG/1
800 TABLET ORAL ONCE
Status: COMPLETED | OUTPATIENT
Start: 2019-05-02 | End: 2019-05-02

## 2019-05-02 RX ORDER — NALOXONE HCL 0.4 MG/ML
0.4 VIAL (ML) INJECTION
Status: DISCONTINUED | OUTPATIENT
Start: 2019-05-02 | End: 2019-05-05 | Stop reason: HOSPADM

## 2019-05-02 RX ORDER — GLYCOPYRROLATE 0.2 MG/ML
INJECTION INTRAMUSCULAR; INTRAVENOUS AS NEEDED
Status: DISCONTINUED | OUTPATIENT
Start: 2019-05-02 | End: 2019-05-02 | Stop reason: SURG

## 2019-05-02 RX ORDER — LISINOPRIL 40 MG/1
40 TABLET ORAL
Status: DISCONTINUED | OUTPATIENT
Start: 2019-05-02 | End: 2019-05-05 | Stop reason: HOSPADM

## 2019-05-02 RX ORDER — ONDANSETRON 4 MG/1
4 TABLET, FILM COATED ORAL EVERY 6 HOURS PRN
Status: DISCONTINUED | OUTPATIENT
Start: 2019-05-02 | End: 2019-05-05 | Stop reason: HOSPADM

## 2019-05-02 RX ORDER — HYDROMORPHONE HYDROCHLORIDE 1 MG/ML
0.5 INJECTION, SOLUTION INTRAMUSCULAR; INTRAVENOUS; SUBCUTANEOUS
Status: DISCONTINUED | OUTPATIENT
Start: 2019-05-02 | End: 2019-05-02 | Stop reason: HOSPADM

## 2019-05-02 RX ORDER — METOPROLOL SUCCINATE 100 MG/1
100 TABLET, EXTENDED RELEASE ORAL DAILY
Status: DISCONTINUED | OUTPATIENT
Start: 2019-05-03 | End: 2019-05-05 | Stop reason: HOSPADM

## 2019-05-02 RX ORDER — ASPIRIN 81 MG/1
81 TABLET, CHEWABLE ORAL ONCE
Status: COMPLETED | OUTPATIENT
Start: 2019-05-02 | End: 2019-05-02

## 2019-05-02 RX ORDER — PROMETHAZINE HYDROCHLORIDE 12.5 MG/1
12.5 TABLET ORAL EVERY 6 HOURS PRN
Status: DISCONTINUED | OUTPATIENT
Start: 2019-05-02 | End: 2019-05-05 | Stop reason: HOSPADM

## 2019-05-02 RX ORDER — FAMOTIDINE 20 MG/1
20 TABLET, FILM COATED ORAL ONCE
Status: CANCELLED | OUTPATIENT
Start: 2019-05-02 | End: 2019-05-02

## 2019-05-02 RX ORDER — MAGNESIUM HYDROXIDE 1200 MG/15ML
LIQUID ORAL AS NEEDED
Status: DISCONTINUED | OUTPATIENT
Start: 2019-05-02 | End: 2019-05-02 | Stop reason: HOSPADM

## 2019-05-02 RX ORDER — CEFAZOLIN SODIUM 2 G/100ML
2 INJECTION, SOLUTION INTRAVENOUS EVERY 8 HOURS
Status: COMPLETED | OUTPATIENT
Start: 2019-05-02 | End: 2019-05-03

## 2019-05-02 RX ORDER — MORPHINE SULFATE 4 MG/ML
5 INJECTION, SOLUTION INTRAMUSCULAR; INTRAVENOUS EVERY 4 HOURS PRN
Status: DISCONTINUED | OUTPATIENT
Start: 2019-05-02 | End: 2019-05-05 | Stop reason: HOSPADM

## 2019-05-02 RX ORDER — PROMETHAZINE HYDROCHLORIDE 25 MG/ML
12.5 INJECTION, SOLUTION INTRAMUSCULAR; INTRAVENOUS EVERY 6 HOURS PRN
Status: DISCONTINUED | OUTPATIENT
Start: 2019-05-02 | End: 2019-05-05 | Stop reason: HOSPADM

## 2019-05-02 RX ORDER — PROPOFOL 10 MG/ML
VIAL (ML) INTRAVENOUS AS NEEDED
Status: DISCONTINUED | OUTPATIENT
Start: 2019-05-02 | End: 2019-05-02 | Stop reason: SURG

## 2019-05-02 RX ORDER — LIDOCAINE HYDROCHLORIDE 10 MG/ML
0.5 INJECTION, SOLUTION EPIDURAL; INFILTRATION; INTRACAUDAL; PERINEURAL ONCE AS NEEDED
Status: COMPLETED | OUTPATIENT
Start: 2019-05-02 | End: 2019-05-02

## 2019-05-02 RX ORDER — CEFAZOLIN SODIUM 2 G/100ML
2 INJECTION, SOLUTION INTRAVENOUS ONCE
Status: COMPLETED | OUTPATIENT
Start: 2019-05-02 | End: 2019-05-02

## 2019-05-02 RX ORDER — ACETAMINOPHEN 500 MG
1000 TABLET ORAL ONCE
Status: COMPLETED | OUTPATIENT
Start: 2019-05-02 | End: 2019-05-02

## 2019-05-02 RX ORDER — OXYCODONE AND ACETAMINOPHEN 7.5; 325 MG/1; MG/1
1 TABLET ORAL EVERY 4 HOURS PRN
Status: DISCONTINUED | OUTPATIENT
Start: 2019-05-02 | End: 2019-05-05 | Stop reason: HOSPADM

## 2019-05-02 RX ADMIN — EPHEDRINE SULFATE 10 MG: 50 INJECTION INTRAMUSCULAR; INTRAVENOUS; SUBCUTANEOUS at 14:07

## 2019-05-02 RX ADMIN — PHENYLEPHRINE HYDROCHLORIDE 80 MCG: 10 INJECTION INTRAVENOUS at 15:36

## 2019-05-02 RX ADMIN — PHENYLEPHRINE HYDROCHLORIDE 80 MCG: 10 INJECTION INTRAVENOUS at 15:29

## 2019-05-02 RX ADMIN — PHENYLEPHRINE HYDROCHLORIDE 80 MCG: 10 INJECTION INTRAVENOUS at 15:04

## 2019-05-02 RX ADMIN — IBUPROFEN 600 MG: 600 TABLET ORAL at 19:54

## 2019-05-02 RX ADMIN — FENTANYL CITRATE 100 MCG: 50 INJECTION, SOLUTION INTRAMUSCULAR; INTRAVENOUS at 13:37

## 2019-05-02 RX ADMIN — ACETAMINOPHEN 650 MG: 325 TABLET, FILM COATED ORAL at 19:55

## 2019-05-02 RX ADMIN — ROCURONIUM BROMIDE 50 MG: 10 INJECTION INTRAVENOUS at 13:24

## 2019-05-02 RX ADMIN — PHENYLEPHRINE HYDROCHLORIDE 80 MCG: 10 INJECTION INTRAVENOUS at 14:38

## 2019-05-02 RX ADMIN — PHENYLEPHRINE HYDROCHLORIDE 80 MCG: 10 INJECTION INTRAVENOUS at 14:28

## 2019-05-02 RX ADMIN — OXYCODONE HYDROCHLORIDE 10 MG: 10 TABLET, FILM COATED, EXTENDED RELEASE ORAL at 11:40

## 2019-05-02 RX ADMIN — FENTANYL CITRATE 50 MCG: 50 INJECTION, SOLUTION INTRAMUSCULAR; INTRAVENOUS at 13:24

## 2019-05-02 RX ADMIN — CEFAZOLIN SODIUM 2 G: 2 INJECTION, SOLUTION INTRAVENOUS at 13:19

## 2019-05-02 RX ADMIN — SODIUM CHLORIDE, POTASSIUM CHLORIDE, SODIUM LACTATE AND CALCIUM CHLORIDE 90 ML/HR: 600; 310; 30; 20 INJECTION, SOLUTION INTRAVENOUS at 18:21

## 2019-05-02 RX ADMIN — PHENYLEPHRINE HYDROCHLORIDE 80 MCG: 10 INJECTION INTRAVENOUS at 14:50

## 2019-05-02 RX ADMIN — OXYCODONE HYDROCHLORIDE 15 MG: 15 TABLET, FILM COATED, EXTENDED RELEASE ORAL at 19:54

## 2019-05-02 RX ADMIN — ASPIRIN 81 MG 81 MG: 81 TABLET ORAL at 12:08

## 2019-05-02 RX ADMIN — PHENYLEPHRINE HYDROCHLORIDE 80 MCG: 10 INJECTION INTRAVENOUS at 15:41

## 2019-05-02 RX ADMIN — FENTANYL CITRATE 50 MCG: 50 INJECTION, SOLUTION INTRAMUSCULAR; INTRAVENOUS at 15:49

## 2019-05-02 RX ADMIN — PRAVASTATIN SODIUM 20 MG: 20 TABLET ORAL at 18:20

## 2019-05-02 RX ADMIN — HYDROMORPHONE HYDROCHLORIDE 0.5 MG: 1 INJECTION, SOLUTION INTRAMUSCULAR; INTRAVENOUS; SUBCUTANEOUS at 16:40

## 2019-05-02 RX ADMIN — IBUPROFEN 600 MG: 600 TABLET ORAL at 18:20

## 2019-05-02 RX ADMIN — PROPOFOL 200 MG: 10 INJECTION, EMULSION INTRAVENOUS at 13:24

## 2019-05-02 RX ADMIN — FAMOTIDINE 20 MG: 20 TABLET ORAL at 11:40

## 2019-05-02 RX ADMIN — NEOSTIGMINE METHYLSULFATE 2 MG: 1 INJECTION, SOLUTION INTRAVENOUS at 16:01

## 2019-05-02 RX ADMIN — DEXAMETHASONE SODIUM PHOSPHATE 8 MG: 4 INJECTION, SOLUTION INTRAMUSCULAR; INTRAVENOUS at 15:52

## 2019-05-02 RX ADMIN — ACETAMINOPHEN 1000 MG: 500 TABLET ORAL at 11:40

## 2019-05-02 RX ADMIN — SODIUM CHLORIDE, POTASSIUM CHLORIDE, SODIUM LACTATE AND CALCIUM CHLORIDE: 600; 310; 30; 20 INJECTION, SOLUTION INTRAVENOUS at 13:19

## 2019-05-02 RX ADMIN — PHENYLEPHRINE HYDROCHLORIDE 80 MCG: 10 INJECTION INTRAVENOUS at 15:23

## 2019-05-02 RX ADMIN — PHENYLEPHRINE HYDROCHLORIDE 80 MCG: 10 INJECTION INTRAVENOUS at 15:15

## 2019-05-02 RX ADMIN — LATANOPROST 1 DROP: 50 SOLUTION OPHTHALMIC at 19:53

## 2019-05-02 RX ADMIN — GLYCOPYRROLATE 0.4 MG: 0.2 INJECTION, SOLUTION INTRAMUSCULAR; INTRAVENOUS at 16:01

## 2019-05-02 RX ADMIN — LIDOCAINE HYDROCHLORIDE 50 MG: 10 INJECTION, SOLUTION EPIDURAL; INFILTRATION; INTRACAUDAL; PERINEURAL at 13:24

## 2019-05-02 RX ADMIN — SODIUM CHLORIDE, POTASSIUM CHLORIDE, SODIUM LACTATE AND CALCIUM CHLORIDE 9 ML/HR: 600; 310; 30; 20 INJECTION, SOLUTION INTRAVENOUS at 11:41

## 2019-05-02 RX ADMIN — FENTANYL CITRATE 50 MCG: 50 INJECTION, SOLUTION INTRAMUSCULAR; INTRAVENOUS at 15:11

## 2019-05-02 RX ADMIN — CEFAZOLIN SODIUM 2 G: 2 INJECTION, SOLUTION INTRAVENOUS at 19:54

## 2019-05-02 RX ADMIN — IBUPROFEN 800 MG: 800 TABLET, FILM COATED ORAL at 11:40

## 2019-05-02 RX ADMIN — LIDOCAINE HYDROCHLORIDE 0.5 ML: 10 INJECTION, SOLUTION EPIDURAL; INFILTRATION; INTRACAUDAL; PERINEURAL at 11:40

## 2019-05-02 RX ADMIN — ACETAMINOPHEN 650 MG: 325 TABLET, FILM COATED ORAL at 18:20

## 2019-05-02 NOTE — INTERVAL H&P NOTE
"Pre-Op H&P (See Recent Office Note Attached for Full H&P)    Chief complaint: Back and right leg pain with sensory alteration    Review of Systems:  General ROS:  no fever, chills, rashes, No change since last office visit  Cardiovascular ROS: no chest pain or dyspnea on exertion.  History of CAD s/p stent 2005.  Last seen by cards 2015, now follows with PCP.  No new cardiac symptoms.  Anginal equivalent of diaphoresis/nausea with no recent episodes.  Respiratory ROS: no cough, shortness of breath, or wheezing    Meds:    No current facility-administered medications on file prior to encounter.      Current Outpatient Medications on File Prior to Encounter   Medication Sig Dispense Refill   • amLODIPine (NORVASC) 10 MG tablet 10 mg Daily.     • baclofen (LIORESAL) 10 MG tablet 10 mg 3 (Three) Times a Day As Needed.     • benazepril (LOTENSIN) 40 MG tablet Take 40 mg by mouth Daily.     • hydrochlorothiazide (HYDRODIURIL) 25 MG tablet Take 25 mg by mouth Daily.     • HYDROcodone-acetaminophen (NORCO) 7.5-325 MG per tablet Take 1 tablet by mouth 2 (Two) Times a Day As Needed for Moderate Pain . 50 tablet 0   • latanoprost (XALATAN) 0.005 % ophthalmic solution Administer  to both eyes Every Night.     • metoprolol succinate XL (TOPROL-XL) 100 MG 24 hr tablet 100 mg Daily.     • Multiple Vitamins-Minerals (MULTIVITAL PO) Take  by mouth Daily.     • omeprazole (priLOSEC) 20 MG capsule 20 mg Daily.     • pravastatin (PRAVACHOL) 20 MG tablet 20 mg Daily.     • vitamin E 400 UNIT capsule Take 400 Units by mouth Daily.     • aspirin 81 MG EC tablet Take 81 mg by mouth Daily.         Vital Signs:  /79 (BP Location: Right arm, Patient Position: Lying)   Pulse 88   Temp 98.1 °F (36.7 °C) (Tympanic)   Resp 18   Ht 162.6 cm (64\")   Wt 78.5 kg (173 lb)   SpO2 98%   BMI 29.70 kg/m²     Physical Exam:    CV:  S1S2 regular rate and rhythm, no murmur               Resp:  Clear to auscultation; respirations regular, even " and unlabored    Results Review:     Lab Results   Component Value Date    WBC 5.45 04/29/2019    HGB 13.8 04/29/2019    HCT 41.1 04/29/2019    MCV 88.6 04/29/2019     04/29/2019    K 3.6 04/29/2019        I reviewed the patient's new clinical results.    Cancer Staging (if applicable)  Cancer Patient: __ yes _x_no __unknown; If yes, clinical stage T:__ N:__M:__, stage group or __N/A    Assessment/Plan:    Diagnosis:   The patient's main complaint is right leg pain that likely reflects an L4 radiculopathy.  This could be emanating from the L3-4 and/or L4-5 level (foraminally).     Treatment Options:   Given her severe symptoms I have recommended L4-5 PLIF as well as right L3-4 laminotomy and foraminotomy.  This will likely help with her symptoms although it may not completely eradicate them.    Harmony Dobbins, APRN  5/2/2019   12:53 PM

## 2019-05-02 NOTE — ANESTHESIA PROCEDURE NOTES
Airway  Urgency: elective    Airway not difficult    General Information and Staff    Patient location during procedure: OR    Indications and Patient Condition  Indications for airway management: airway protection    Preoxygenated: yes  MILS not maintained throughout  Mask difficulty assessment: 1 - vent by mask    Final Airway Details  Final airway type: endotracheal airway      Successful airway: ETT  Cuffed: yes   Successful intubation technique: direct laryngoscopy  Facilitating devices/methods: intubating stylet  Endotracheal tube insertion site: oral  Blade: Adeline  Blade size: 3  ETT size (mm): 7.5  Cormack-Lehane Classification: grade IIa - partial view of glottis  Placement verified by: chest auscultation and capnometry   Measured from: lips  ETT to lips (cm): 20  Number of attempts at approach: 1    Additional Comments  Negative epigastric sounds, Breath sound equal bilaterally with symmetric chest rise and fall

## 2019-05-02 NOTE — ANESTHESIA PREPROCEDURE EVALUATION
Anesthesia Evaluation     Patient summary reviewed and Nursing notes reviewed   NPO Solid Status: > 8 hours  NPO Liquid Status: > 8 hours           Airway   Dental      Pulmonary    Cardiovascular         Neuro/Psych  GI/Hepatic/Renal/Endo      Musculoskeletal     Abdominal    Substance History      OB/GYN          Other                        Anesthesia Plan    ASA 3

## 2019-05-02 NOTE — OP NOTE
NEUROSURGICAL OPERATIVE NOTE        PREOPERATIVE DIAGNOSIS:    Lumbar stenosis and instability      POSTOPERATIVE DIAGNOSIS:  Same      PROCEDURE:  1.  L4-5 arthrodesis interbody type  2.  Bilateral L4-5 laminotomies, medial facetectomies, foraminotomies  3.  Right L3-4 laminotomy, medial facetectomy, foraminotomy  4.  Bilateral L4-5 discectomy with bilateral Capstone cage placement  5.  Nonsegmental pedicle screw fixation L4-5 utilizing PEEK rods  6.  Brigette utilized with local autograft  7.  Stealth frameless stereotaxy utilized in conjunction with O arm imaging    SURGEON:  Yoni Borja M.D.      ASSISTANT: Sara Reaves PA-C      ANESTHESIA:  General      ESTIMATED BLOOD LOSS: 100 mL      SPECIMEN: None      DRAINS: Hemovac      COMPLICATIONS:  None      CLINICAL NOTE:  Ms. Bartholomew is a 68-year-old woman with chronic ongoing numbness and pain involving her back and right leg.  Studies raise the specter of an L4 radiculopathy that may emanate from the recess at L3-4 or the foraminal level at L4-5 where there is significant disc space collapse.  As such, she presents at this time for lumbar decompression as well as fusion at L4-5 to try and open up the disc space and to do a substantial decompression that may result in instability.  The nature of the procedure as well as the potential risks, complications, limitations, and alternatives to the procedure were discussed at length with the patient and the patient has agreed to proceed with surgery.      TECHNICAL NOTE:  The patient was brought to the operating room. While on her cart, general endotracheal anesthesia was achieved. She was then turned prone onto the Ryan table. Special care was ensured to protect pressure points. Her low back was prepared and draped in the usual fashion. A localizing radiograph was obtained with a spinal needle in the lumbosacral midline. Based on this, a several-centimeter vertical incision was fashioned. Underlying tissues  were divided with cautery to provide exposure to the posterior spinal elements from L3 through L5. Reference frame was affixed to a spinous process and O-arm imaging ensued. These images were downloaded into the [facility]. Using Stealth frameless stereotaxy, each of the pedicle screw hole sites at L4 and L5 bilaterally were marked, drilled and tapped; 6.5 mm diameter x 45 mm length screws were used bilaterally at L4 and L5. Laminotomy, partial facetectomy and foraminotomies were performed bilaterally at L4-5 and on the right at L3-4. In doing the foraminotomy on the right at L4-5, a small dural rent occurred. This was closed primarily with 6-0 Prolene suture. A watertight seal was achieved. The C-arm was brought into use. The disk at L4-5 on the right was incised and evacuated piecemeal. It was necessary to distract off of the pedicle screws to gain access to the disk space. Curettes and punches were utilized to remove disk and prepare the endplates. Serial impactors were impacted into the disk space and ultimately an 8 x 22 mm Capstone cage packed with a combination of [place] and local autograft was impacted into the space using fluoroscopic guidance. The procedure was repeated on the contralateral side prior to placing the 2nd cage. Some of the fusion material was placed anteriorly and in the midline. I was able to substantially open up the disk space and well decompress the nerve root. Additional bone removal more laterally was performed on the right to make sure that the L4 nerve root was freed up along its entire course. Then 35 mm PEEK rods were affixed to the screw heads. Set screws were applied and tightened and broken off per routine.  DuraGen was placed over the small area of dural repair which was on the right side at L4-5. A Hemovac drain was brought in through a separate stab incision and left in the epidural space. Vancomycin powder was sprinkled in the depths and then more superficially as the  wound was closed. The paraspinous muscle and fascia were reapproximated in an interrupted fashion with 0 Vicryl suture; 0.25% Marcaine was instilled in the paraspinous musculature and subcutaneous tissues. The subcutaneous tissues were closed in layers with 2-0 followed by 3-0 Vicryl suture. The skin was closed in a running locked fashion with a 3-0 Vicryl suture. Sterile dressing was applied. She was rolled onto her cart, extubated and taken to the recovery room in satisfactory condition.             Yoni Borja M.D.

## 2019-05-02 NOTE — ANESTHESIA POSTPROCEDURE EVALUATION
Patient: Eneida Bartholomew    Procedure Summary     Date:  05/02/19 Room / [facility]:   [facility]    Anesthesia Start:  1319 Anesthesia Stop:      Procedure:  LUMBAR FUSION DECOMPRESSON WITH PEDICLE SCREWS L4-5, LAMINECTOMY, FORAMINOTOMY RIGHT L3-4 (N/A Spine Lumbar) Diagnosis:       Lumbar stenosis with neurogenic claudication      (Lumbar stenosis with neurogenic claudication [M48.062])    Surgeon:  Yoni Borja MD Provider:  Donya Anderson MD    Anesthesia Type:  Not recorded ASA Status:  Not recorded          Anesthesia Type: No value filed.  Last vitals  BP   149/79 (05/02/19 1128)   Temp   98.1 °F (36.7 °C) (05/02/19 1128)   Pulse   88 (05/02/19 1128)   Resp   18 (05/02/19 1128)     SpO2   98 % (05/02/19 1128)     Post Anesthesia Care and Evaluation    Patient location during evaluation: PACU  Patient participation: complete - patient participated  Level of consciousness: awake and alert  Pain score: 0  Pain management: adequate  Airway patency: patent  Anesthetic complications: No anesthetic complications  PONV Status: none  Cardiovascular status: hemodynamically stable and acceptable  Respiratory status: nonlabored ventilation, acceptable and nasal cannula  Hydration status: acceptable

## 2019-05-02 NOTE — PLAN OF CARE
Problem: Patient Care Overview  Goal: Individualization and Mutuality  Outcome: Ongoing (interventions implemented as appropriate)  Patient arrived on unit slightly drowsy, A/O, HOB flat.  She is aware of dura tear and need to stay flat in bed with the option to roll side to side.  Patient has no pain at this time.  Family at bedside attentive to patient,

## 2019-05-02 NOTE — PLAN OF CARE
Problem: Pain, Acute (Adult)  Goal: Identify Related Risk Factors and Signs and Symptoms  Outcome: Ongoing (interventions implemented as appropriate)   05/02/19 3219   Pain, Acute (Adult)   Related Risk Factors (Acute Pain) procedure/treatment

## 2019-05-03 LAB
HCT VFR BLD AUTO: 32.4 % (ref 34–46.6)
HGB BLD-MCNC: 11.2 G/DL (ref 12–15.9)

## 2019-05-03 PROCEDURE — 85018 HEMOGLOBIN: CPT | Performed by: NEUROLOGICAL SURGERY

## 2019-05-03 PROCEDURE — 85014 HEMATOCRIT: CPT | Performed by: NEUROLOGICAL SURGERY

## 2019-05-03 PROCEDURE — 25010000002 ONDANSETRON PER 1 MG: Performed by: NEUROLOGICAL SURGERY

## 2019-05-03 PROCEDURE — 97162 PT EVAL MOD COMPLEX 30 MIN: CPT

## 2019-05-03 PROCEDURE — 97110 THERAPEUTIC EXERCISES: CPT

## 2019-05-03 PROCEDURE — 25010000003 CEFAZOLIN IN DEXTROSE 2-4 GM/100ML-% SOLUTION: Performed by: NEUROLOGICAL SURGERY

## 2019-05-03 PROCEDURE — 94799 UNLISTED PULMONARY SVC/PX: CPT

## 2019-05-03 RX ADMIN — METOPROLOL SUCCINATE 100 MG: 100 TABLET, EXTENDED RELEASE ORAL at 09:08

## 2019-05-03 RX ADMIN — HYDROCHLOROTHIAZIDE 25 MG: 25 TABLET ORAL at 09:07

## 2019-05-03 RX ADMIN — PANTOPRAZOLE SODIUM 40 MG: 40 TABLET, DELAYED RELEASE ORAL at 09:08

## 2019-05-03 RX ADMIN — OXYCODONE HYDROCHLORIDE AND ACETAMINOPHEN 1 TABLET: 7.5; 325 TABLET ORAL at 00:02

## 2019-05-03 RX ADMIN — PRAVASTATIN SODIUM 20 MG: 20 TABLET ORAL at 09:08

## 2019-05-03 RX ADMIN — SODIUM CHLORIDE, POTASSIUM CHLORIDE, SODIUM LACTATE AND CALCIUM CHLORIDE 90 ML/HR: 600; 310; 30; 20 INJECTION, SOLUTION INTRAVENOUS at 00:02

## 2019-05-03 RX ADMIN — IBUPROFEN 600 MG: 600 TABLET ORAL at 16:43

## 2019-05-03 RX ADMIN — LATANOPROST 1 DROP: 50 SOLUTION OPHTHALMIC at 21:21

## 2019-05-03 RX ADMIN — ACETAMINOPHEN 650 MG: 325 TABLET, FILM COATED ORAL at 09:08

## 2019-05-03 RX ADMIN — ASPIRIN 81 MG: 81 TABLET, COATED ORAL at 09:08

## 2019-05-03 RX ADMIN — CEFAZOLIN SODIUM 2 G: 2 INJECTION, SOLUTION INTRAVENOUS at 05:42

## 2019-05-03 RX ADMIN — OXYCODONE HYDROCHLORIDE 15 MG: 15 TABLET, FILM COATED, EXTENDED RELEASE ORAL at 09:08

## 2019-05-03 RX ADMIN — LISINOPRIL 40 MG: 40 TABLET ORAL at 09:08

## 2019-05-03 RX ADMIN — SODIUM CHLORIDE, POTASSIUM CHLORIDE, SODIUM LACTATE AND CALCIUM CHLORIDE 90 ML/HR: 600; 310; 30; 20 INJECTION, SOLUTION INTRAVENOUS at 17:40

## 2019-05-03 RX ADMIN — AMLODIPINE BESYLATE 10 MG: 10 TABLET ORAL at 09:08

## 2019-05-03 RX ADMIN — OXYCODONE HYDROCHLORIDE 15 MG: 15 TABLET, FILM COATED, EXTENDED RELEASE ORAL at 21:21

## 2019-05-03 RX ADMIN — ACETAMINOPHEN 650 MG: 325 TABLET, FILM COATED ORAL at 21:21

## 2019-05-03 RX ADMIN — IBUPROFEN 600 MG: 600 TABLET ORAL at 09:08

## 2019-05-03 RX ADMIN — ACETAMINOPHEN 650 MG: 325 TABLET, FILM COATED ORAL at 16:43

## 2019-05-03 RX ADMIN — SODIUM CHLORIDE, POTASSIUM CHLORIDE, SODIUM LACTATE AND CALCIUM CHLORIDE 90 ML/HR: 600; 310; 30; 20 INJECTION, SOLUTION INTRAVENOUS at 09:10

## 2019-05-03 RX ADMIN — IBUPROFEN 600 MG: 600 TABLET ORAL at 21:21

## 2019-05-03 RX ADMIN — OXYCODONE HYDROCHLORIDE AND ACETAMINOPHEN 1 TABLET: 7.5; 325 TABLET ORAL at 05:41

## 2019-05-03 RX ADMIN — OXYCODONE HYDROCHLORIDE AND ACETAMINOPHEN 1 TABLET: 7.5; 325 TABLET ORAL at 11:36

## 2019-05-03 RX ADMIN — ONDANSETRON 4 MG: 2 INJECTION INTRAMUSCULAR; INTRAVENOUS at 05:41

## 2019-05-03 NOTE — PLAN OF CARE
Problem: Patient Care Overview  Goal: Plan of Care Review  Outcome: Ongoing (interventions implemented as appropriate)   05/03/19 1649   Plan of Care Review   Progress improving   OTHER   Outcome Summary Vitals WNL. Pt started shift on flat bedrest. At noon, pt's HOB was raised to 30 degrees. Pt tolerated this elevation well and was able to ambulate with no drainage from incision and no headache. Pt denies numbness or tingling. Chief complaint has been back pain that radiates to the right leg rated 2-5/10. PRN percocet given in addition to scheduled pain meds. Pt reports relief with these interventions. Coverderm in place with a small amount of sanguineous drainage present. Hemovac with 100 mL of sanguineous drainage out of drain thus far. Roberts D/C'd and pt is able to void spontaneously with PVR of 65 mL.    Coping/Psychosocial   Plan of Care Reviewed With patient;family       Problem: Pain, Acute (Adult)  Goal: Identify Related Risk Factors and Signs and Symptoms  Outcome: Outcome(s) achieved Date Met: 05/03/19 05/03/19 1649   Pain, Acute (Adult)   Related Risk Factors (Acute Pain) knowledge deficit;patient perception;persistent pain;surgery   Signs and Symptoms (Acute Pain) alteration in muscle tone;BADLs/IADLs reluctance/inability to perform;fatigue/weakness;verbalization of pain descriptors     Goal: Acceptable Pain Control/Comfort Level  Outcome: Ongoing (interventions implemented as appropriate)   05/03/19 1649   Pain, Acute (Adult)   Acceptable Pain Control/Comfort Level making progress toward outcome       Problem: Skin Injury Risk (Adult)  Goal: Identify Related Risk Factors and Signs and Symptoms  Outcome: Outcome(s) achieved Date Met: 05/03/19 05/03/19 1649   Skin Injury Risk (Adult)   Related Risk Factors (Skin Injury Risk) mobility impaired     Goal: Skin Health and Integrity  Outcome: Ongoing (interventions implemented as appropriate)   05/03/19 1649   Skin Injury Risk (Adult)   Skin Health and  Integrity making progress toward outcome       Problem: Fall Risk (Adult)  Goal: Identify Related Risk Factors and Signs and Symptoms  Outcome: Outcome(s) achieved Date Met: 05/03/19 05/03/19 1649   Fall Risk (Adult)   Related Risk Factors (Fall Risk) gait/mobility problems;environment unfamiliar   Signs and Symptoms (Fall Risk) presence of risk factors     Goal: Absence of Fall  Outcome: Ongoing (interventions implemented as appropriate)   05/03/19 1649   Fall Risk (Adult)   Absence of Fall making progress toward outcome       Problem: Laminectomy/Foraminotomy/Discectomy (Adult)  Goal: Signs and Symptoms of Listed Potential Problems Will be Absent, Minimized or Managed (Laminectomy/Foraminotomy/Discectomy)  Outcome: Ongoing (interventions implemented as appropriate)   05/03/19 1649   Goal/Outcome Evaluation   Problems Assessed (Laminectomy/Laminotomy/Discectomy) all   Problems Present (Laminectomy/otomy) cerebrospinal fluid leak;functional decline/self-care deficit;pain     Goal: Anesthesia/Sedation Recovery  Outcome: Outcome(s) achieved Date Met: 05/03/19 05/03/19 1649   Goal/Outcome Evaluation   Anesthesia/Sedation Recovery recovered to baseline

## 2019-05-03 NOTE — PROGRESS NOTES
"NEUROSURGERY PROGRESS NOTE     LOS: 1 day   Patient Care Team:  Cari Rodriguez MD as PCP - General (Internal Medicine)  Cari Rodriguez MD as Referring Physician (Internal Medicine)    Chief Complaint: Back and right leg pain.    POD#: 1 Day Post-Op  Procedures:  L4-5 PLIF.  Right L3-4 foraminotomy.    Interval History:   Patient Complaints: Mild incisional pain.  Nausea which has improved.  Patient Denies: Her previous right shoulder pain feels better.  She is not having any significant right leg pain at this point.  She has no headache.    Vital Signs: Blood pressure 96/51, pulse 91, temperature 97.4 °F (36.3 °C), temperature source Temporal, resp. rate 16, height 162.6 cm (64\"), weight 78.5 kg (173 lb), SpO2 98 %.  Intake/Output:     Intake/Output Summary (Last 24 hours) at 5/3/2019 0651  Last data filed at 5/3/2019 0449  Gross per 24 hour   Intake 3004 ml   Output 2400 ml   Net 604 ml     Drain output: 200 mL.    Physical Exam:  The patient is awake and alert.  There is trace drainage on her lumbar bandage.  Lower extremity motor function is intact.     Data Review:    Results from last 7 days   Lab Units 04/29/19  1013   WBC 10*3/mm3 5.45   HEMOGLOBIN g/dL 13.8   HEMATOCRIT % 41.1   PLATELETS 10*3/mm3 241         Assessment/Plan:  1.  Lumbar stenosis and radiculopathy status post decompression right L3-4 and L4-5 with L4-5 fusion and stabilization.  2.  Intraoperative dural rent.  3.  History of coronary artery disease.  4.  History of hypertension.  5.  Disposition: Mobilize beginning this afternoon.  I anticipate that the patient will be discharged home in a couple of days.      Yoni Borja MD  05/03/19  6:51 AM    "

## 2019-05-03 NOTE — PLAN OF CARE
Problem: Patient Care Overview  Goal: Plan of Care Review  Outcome: Ongoing (interventions implemented as appropriate)   05/03/19 2173   Plan of Care Review   Progress improving   OTHER   Outcome Summary Pt ambulates 450 ft in baron with CGA, needs MIN A for sit<>stand, MOD A for bed mobility. Pt denies headache with all mobility, fatigued after, but tolerates ther ex. Pt is expected to d/c home with assist.    Coping/Psychosocial   Plan of Care Reviewed With patient

## 2019-05-03 NOTE — DISCHARGE INSTR - ACTIVITY
No bending, lifting, or twisting.     Use ice as needed for pain and swelling.     Keep your incision clean and dry.    Change your dressing as needed.    You may get your incision wet in the shower beginning on 5/7/19.    No tub bathing or swimming until your follow-up appointment.    Contact Dr. Borja's office for clear wound drainage or severe headache.

## 2019-05-03 NOTE — PROGRESS NOTES
Discharge Planning Assessment  [facility]     Patient Name: Eneida Bartholomew  MRN: [id number]  Today's Date: 5/3/2019    Admit Date: 5/2/2019    Discharge Needs Assessment     Row Name 05/03/19 1247       Living Environment    Lives With  spouse;grandchild(cachorro)    Name(s) of Who Lives With Patient  :  Claude and 19 yo grandson    Current Living Arrangements  home/apartment/condo    Family Caregiver if Needed  spouse;grandchild(cachorro), adult    Quality of Family Relationships  helpful;involved;supportive    Able to Return to Prior Arrangements  yes    Living Arrangement Comments  Ms. Bartholomew lives with her  and adult grandson in a 2 story home in [place] [place].  She stated that her family is available to assist her at home as needed.       Resource/Environmental Concerns    Transportation Concerns  car, none       Transition Planning    Patient/Family Anticipates Transition to  home with family    Transportation Anticipated  family or friend will provide       Discharge Needs Assessment    Equipment Currently Used at Home  walker, rolling;cane, straight;commode;shower chair access to DME, but doesn't use.    Equipment Needed After Discharge  none        Discharge Plan     Row Name 05/03/19 1244       Plan    Plan  Home with family's assistance    Patient/Family in Agreement with Plan  yes    Plan Comments  Met with Ms. Bartholomew at the bedside for discharge planning.  Ms. Bartholomew is on bedrest with HOB flat currently for dural rent.  Ms. Bartholomew stated that she will have sufficient assistance when she returns home from her family.  She denies any DME needs.  CM will continue to follow to assist with DC needs.      Final Discharge Disposition Code  01 - home or self-care        Destination      No service coordination in this encounter.      Durable Medical Equipment      No service coordination in this encounter.      Dialysis/Infusion      No service coordination in this encounter.      Home Medical Care       No service coordination in this encounter.      Therapy      No service coordination in this encounter.      Community Resources      No service coordination in this encounter.        Expected Discharge Date and Time     Expected Discharge Date Expected Discharge Time    May 5, 2019         Demographic Summary     Row Name 05/03/19 1243       General Information    Admission Type  inpatient    Arrived From  home    Reason for Consult  discharge planning    General Information Comments  PCP:  Cari Rodriguez       Contact Information    Permission Granted to Share Info With      Contact Information Comments  :  Claude, ph [phone]        Functional Status     Row Name 05/03/19 1244       Functional Status    Usual Activity Tolerance  moderate    Current Activity Tolerance  -- Bedrest currently.  Will follow for notes when ambulating.       Functional Status, IADL    Medications  independent    Meal Preparation  independent    Housekeeping  independent    Laundry  independent    Shopping  independent       Mental Status    General Appearance WDL  WDL        Psychosocial    No documentation.       Abuse/Neglect    No documentation.       Legal     Row Name 05/03/19 1246       Financial/Legal    Who Manages Finances if Patient Unable  No advance directives.    Finance Comments  Ms. Bartholomew has prescription drug coverage with [employer].  Verified insurance with patient.        Substance Abuse    No documentation.       Patient Forms    No documentation.           Sarika Castle

## 2019-05-03 NOTE — PROGRESS NOTES
"Clinical Nutrition   Reason For Visit: MST score 2+, Unintentional weight loss    Patient Name: Eneida Bartholomew  YOB: 1950  MRN: [id number]  Date of Encounter: 05/03/19 3:09 PM  Admission date: 5/2/2019        Nutrition Assessment     Admission Problem List:  Lumbar stenosis and instability      Applicable Nutrition-Related Information:  Unintentional weight loss  Decreased appeitite      Applicable medical tests/procedures since admission:   L4-5 arthrodesis interbody type    Bilateral L4-5 laminotomies, medial facetectomies, foraminotomies   Right L3-4 laminotomy, medial facetectomy, foraminotomy   Bilateral L4-5 discectomy with bilateral Capstone cage placement    Nonsegmental pedicle screw fixation L4-5 utilizing PEEK rods             PMH: She  has a past medical history of Arthritis, Bronchitis, Coronary artery disease, Fibromyalgia, GERD (gastroesophageal reflux disease), Glaucoma, History of migraine, Hypertension, and UTI (urinary tract infection).   PSxH: She  has a past surgical history that includes Cholecystectomy; Breast biopsy (Right); Bunionectomy (Bilateral); Coronary angioplasty with stent; Cataract extraction, extracapsular w/ intraocular lens implant (Bilateral); and lumbar laminectomy with fusion (N/A, 5/2/2019).        Reported/Observed/Food/Nutrition Related History     Pt reports an allergy to seafood. Decreased po intake with increased pain at home. She states she at time does not have the energy to eat due to the pain. She has been eating foods that do not require a lot of chewing such as soups, mashed potatoes, yogurt, and applesauce. Pt states her acid reflux has bothered her post operatively due to having to lie flat. Discussed smaller portions and adding in snacks to make the food not seem so overwhelming to eat. She is agreeable to trying some supplements while here.     Anthropometrics   Height: 64\"  Weight: 173 lbs --stated on admission  BMI: 29.70  BMI " classification: Overweight: 25.0-29.9kg/m2    UBW: 140 lbs ( 184 lbs over last 10 months) per pt report  IBW:  120 lbs      Weight change: Pt reports recent weight loss of ~ 11 lbs in 5 months (6%). Pt also states her UBW prior to back pain issues was 140 lbs.  States she became more immobile and gained 44 lbs.  Pain had become so intense in the last 3-4 months that her appetite has decreased which resulted in the 11 lb weight loss.       Labs reviewed   Labs reviewed: Yes  Results from last 7 days   Lab Units 04/29/19  1013   POTASSIUM mmol/L 3.6     Results from last 7 days   Lab Units 04/29/19  1013   WBC 10*3/mm3 5.45         Lab Results   Lab Value Date/Time    HGBA1C 5.00 04/29/2019 1013     Medications reviewed   Medications reviewed: Yes      Intake/Ouptut 24 hrs (7:00AM - 6:59 AM)     Intake & Output (last day)       05/02 0701 - 05/03 0700 05/03 0701 - 05/04 0700    I.V. (mL/kg) 3004 (38.3) 944 (12)    Total Intake(mL/kg) 3004 (38.3) 944 (12)    Urine (mL/kg/hr) 1900 650 (1)    Drains 200 100    Blood 300     Total Output 2400 750    Net +604 +194                     Current Nutrition Prescription   PO: Diet Regular      Evaluation of Received Nutrient/Fluid Intake:  Insufficient data       Nutrition Diagnosis     Problem Unintended weight loss   Etiology Severe pain from lumbar stenosis   Signs/Symptoms Weight loss of ~11 lbs in 5 months (6%)       Problem Predicted suboptimal energy intake   Etiology Decreased appetite due to pain   Signs/Symptoms Reported decreased appetite x3 months pta       Intervention   Intervention: Follow treatment progress, Care plan reviewed, Advise alternate selection, Advised available snacks, Interview for preferences, Menu adjusted, Encourage intake, Supplement provided  1) Boost plus 2x/day  2) Smaller portions at meals  3) Snack 2pm and HS    Goal:   General: Nutrition support treatment  PO: Establish PO, Tolerate PO      Additional goals:pt to consume >67% of meals  consistently      Monitoring/Evaluation:       Monitoring/Evaluation: Per protocol, I&O, PO intake, Supplement intake, Skin status, Symptoms  Will Continue to follow per protocol  Franchesca Nguyen RD  Time Spent: 40 min

## 2019-05-03 NOTE — THERAPY EVALUATION
Acute Care - Physical Therapy Initial Evaluation  [facility]     Patient Name: Eneida Bartholomew  : 1950  MRN: [id number]  Today's Date: 5/3/2019   Onset of Illness/Injury or Date of Surgery: 19  Date of Referral to PT: 19  Referring Physician: MD Jonh      Admit Date: 2019    Visit Dx:     ICD-10-CM ICD-9-CM   1. Impaired functional mobility, balance, gait, and endurance Z74.09 V49.89   2. Lumbar stenosis with neurogenic claudication M48.062 724.03     Patient Active Problem List   Diagnosis   • Lumbar stenosis with neurogenic claudication     Past Medical History:   Diagnosis Date   • Arthritis    • Bronchitis    • Coronary artery disease    • Fibromyalgia    • GERD (gastroesophageal reflux disease)    • Glaucoma    • History of migraine    • Hypertension    • UTI (urinary tract infection)      Past Surgical History:   Procedure Laterality Date   • BREAST BIOPSY Right    • BUNIONECTOMY Bilateral     x4   • CATARACT EXTRACTION EXTRACAPSULAR W/ INTRAOCULAR LENS IMPLANTATION Bilateral    • CHOLECYSTECTOMY     • CORONARY ANGIOPLASTY WITH STENT PLACEMENT     • LUMBAR LAMINECTOMY WITH FUSION N/A 2019    Procedure: LUMBAR FUSION DECOMPRESSON WITH PEDICLE SCREWS L4-5, LAMINECTOMY, FORAMINOTOMY RIGHT L3-4;  Surgeon: Yoni Borja MD;  Location: [facility];  Service: Neurosurgery        PT ASSESSMENT (last 12 hours)      Physical Therapy Evaluation     Row Name 19 1324          PT Evaluation Time/Intention    Symptoms Noted During/After Treatment  fatigue;increased pain  -EJ     Row Name 19 1324          General Information    Patient Profile Reviewed?  yes  -EJ     Onset of Illness/Injury or Date of Surgery  19  -EJ     Referring Physician  MD Jonh  -EJ     Patient Observations  alert;cooperative;agree to therapy  -EJ     Patient/Family Observations   present  -EJ     General Observations of Patient  pt reclined with bed at 30 degrees.   -EJ     Prior Level  of Function  min assist:;bed mobility;independent:;gait;transfer;mod assist:;home management  -EJ     Equipment Currently Used at Home  walker, rolling;rollator;cane, quad;cane, straight;shower chair  -EJ     Pertinent History of Current Functional Problem  Pt Presented to hospital for surgical management of R leg pain with sensation changes. Pt also had complaint of R shoulder pain, has ventriculomegally per imaging. Pt is now s/p PLIF L4-5, R L3-4 foraminotomy with an intraoperative dural rent. Pt was on bed rest with bed flat until A.M., was progressed to 30 degrees then cleared for ambulation in afternoon.  -EJ     Risks Reviewed  patient:;LOB;dizziness;nausea/vomiting;increased discomfort;change in vital signs;increased drainage  -EJ     Benefits Reviewed  patient:;improve function;increase independence;increase strength;decrease pain;increase balance;decrease risk of DVT;improve skin integrity;increase knowledge  -EJ     Barriers to Rehab  none identified  -EJ     Row Name 05/03/19 1324          Relationship/Environment    Primary Source of Support/Comfort  spouse  -EJ     Lives With  spouse;grandchild(cachorro)  -EJ     Family Caregiver if Needed  spouse;grandchild(cachorro), adult  -EJ     Row Name 05/03/19 1324          Resource/Environmental Concerns    Current Living Arrangements  home/apartment/condo  -EJ     Row Name 05/03/19 1324          Home Main Entrance    Number of Stairs, Main Entrance  one  -EJ     Stair Railings, Main Entrance  none  -EJ     Stairs Comment, Main Entrance  two in front  -EJ     Row Name 05/03/19 1324          Stairs Within Home, Primary    Stairs, Within Home, Primary  13  -EJ     Stair Railings, Within Home, Primary  railing on right side (ascending)  -EJ     Row Name 05/03/19 1324          Cognitive Assessment/Intervention- PT/OT    Orientation Status (Cognition)  oriented x 4  -EJ     Follows Commands (Cognition)  follows one step commands;over 90% accuracy  -EJ     Safety Deficit  (Cognitive)  safety precautions follow-through/compliance;safety precautions awareness;insight into deficits/self awareness;awareness of need for assistance  -     Row Name 05/03/19 1324          Safety Issues, Functional Mobility    Safety Issues Affecting Function (Mobility)  safety precaution awareness;safety precautions follow-through/compliance;insight into deficits/self awareness  -     Row Name 05/03/19 1324          Bed Mobility Assessment/Treatment    Bed Mobility Assessment/Treatment  rolling right;sidelying-sit  -EJ     Rolling Right [place] (Bed Mobility)  minimum assist (75% patient effort)  -EJ     Sidelying-Sit [place] (Bed Mobility)  minimum assist (75% patient effort)  -EJ     Bed Mobility, Safety Issues  decreased use of arms for pushing/pulling;decreased use of legs for bridging/pushing;impaired trunk control for bed mobility  -EJ     Assistive Device (Bed Mobility)  bed rails;draw sheet;head of bed elevated  -EJ     Comment (Bed Mobility)  pt cued for log roll technique.  -     Row Name 05/03/19 1324          Transfer Assessment/Treatment    Transfer Assessment/Treatment  stand-sit transfer;sit-stand transfer  -     Maintains Weight-bearing Status (Transfers)  able to maintain;verbal cues to maintain;nonverbal cues (demo/gesture) to maintain;physical assist to maintain  -     Sit-Stand [place] (Transfers)  minimum assist (75% patient effort)  -     Stand-Sit [place] (Transfers)  contact guard  -     Row Name 05/03/19 1324          Sit-Stand Transfer    Assistive Device (Sit-Stand Transfers)  walker, front-wheeled  -EJ     Row Name 05/03/19 1324          Stand-Sit Transfer    Assistive Device (Stand-Sit Transfers)  walker, front-wheeled  -     Row Name 05/03/19 1324          Gait/Stairs Assessment/Training    [place] Level (Gait)  contact guard  -     Assistive Device (Gait)  walker, front-wheeled  -     Distance in Feet (Gait)  450  -EJ     Pattern  (Gait)  step-through  -EJ     Deviations/Abnormal Patterns (Gait)  stride length decreased  -EJ     Bilateral Gait Deviations  forward flexed posture  -EJ     Comment (Gait/Stairs)  pt cued for upright posture  -EJ     [facility] Name 05/03/19 1324          General ROM    GENERAL ROM COMMENTS  BLE WFL  -EJ     Row Name 05/03/19 1324          MMT (Manual Muscle Testing)    General MMT Comments  Knee extension R 4-/5, L5/5. DF BLE 5/5.  -[facility] Name 05/03/19 1324          Motor Assessment/Intervention    Additional Documentation  Therapeutic Exercise (Group);Therapeutic Exercise Interventions (Group)  -EJ     Row Name 05/03/19 1324          Therapeutic Exercise    Lower Extremity (Therapeutic Exercise)  gluteal sets;quad sets, bilateral  -EJ     Lower Extremity Range of Motion (Therapeutic Exercise)  ankle dorsiflexion/plantar flexion, bilateral;hip internal/external rotation, bilateral  -EJ     Core Strength (Therapeutic Exercise)  abdominal bracing  -EJ     Sets/Reps (Therapeutic Exercise)  1/10; encouraged to do one more set today  -EJ     Row Name 05/03/19 1324          Sensory Assessment/Intervention    Sensory General Assessment  no sensation deficits identified reports improvement in RLE, improvement in RUE  -EJ     Row Name 05/03/19 1324          Pain Assessment    Additional Documentation  Pain Scale: FACES Pre/Post-Treatment (Group);Pain Scale: Numbers Pre/Post-Treatment (Group)  -EJ     Row Name 05/03/19 1324          Pain Scale: Numbers Pre/Post-Treatment    Pain Scale: Numbers, Pretreatment  0/10 - no pain  -EJ     Pain Scale: Numbers, Post-Treatment  3/10  -EJ     Pain Location - Side  Bilateral  -EJ     Pain Location - Orientation  lower  -EJ     Pain Location  back  -EJ     Row Name             Wound 05/02/19 1426 Other (See comments) back incision    Wound - Properties Group Date first assessed: 05/02/19  -WS Time first assessed: 1426  -WS Side: Other (See comments)  -WS Location: back  -WS Type:  incision  -WS    Row Name 05/03/19 1324          Coping    Observed Emotional State  calm;cooperative  -EJ     Verbalized Emotional State  acceptance  -EJ     Row Name 05/03/19 1324          Plan of Care Review    Plan of Care Reviewed With  patient  -     Row Name 05/03/19 1324          Physical Therapy Clinical Impression    Date of Referral to PT  05/02/19  -EJ     PT Diagnosis (PT Clinical Impression)  decreased functional mobility, decreased independence with spinal precautions after back sx; improved pain level from pre sx.  -EJ     Criteria for Skilled Interventions Met (PT Clinical Impression)  yes;treatment indicated  -EJ     Rehab Potential (PT Clinical Summary)  good, to achieve stated therapy goals  -EJ     Referral Needed to Another Service (PT)  occupational therapy  -     Row Name 05/03/19 1324          Vital Signs    Pre Patient Position  -- per RN VSS   -     Row Name 05/03/19 1324          Physical Therapy Goals    Bed Mobility Goal Selection (PT)  bed mobility, PT goal 1  -EJ     Transfer Goal Selection (PT)  transfer, PT goal 1  -EJ     Gait Training Goal Selection (PT)  gait training, PT goal 1  -EJ     Stairs Goal Selection (PT)  stairs, PT goal 1;stairs, PT goal 2  -EJ     Additional Documentation  Stairs Goal Selection (PT) (Row)  -     Row Name 05/03/19 1324          Bed Mobility Goal 1 (PT)    Activity/Assistive Device (Bed Mobility Goal 1, PT)  sit to supine/supine to sit  -EJ     [place] Level/Cues Needed (Bed Mobility Goal 1, PT)  conditional independence  -EJ     Time Frame (Bed Mobility Goal 1, PT)  long term goal (LTG);5 days  -     Row Name 05/03/19 1324          Transfer Goal 1 (PT)    Activity/Assistive Device (Transfer Goal 1, PT)  sit-to-stand/stand-to-sit  -EJ     [place] Level/Cues Needed (Transfer Goal 1, PT)  conditional independence  -EJ     Time Frame (Transfer Goal 1, PT)  long term goal (LTG);5 days  -     Row Name 05/03/19 1324          Gait  Training Goal 1 (PT)    Activity/Assistive Device (Gait Training Goal 1, PT)  gait (walking locomotion);walker, rolling  -EJ     [place] Level (Gait Training Goal 1, PT)  conditional independence  -EJ     Distance (Gait Goal 1, PT)  700  -EJ     Time Frame (Gait Training Goal 1, PT)  long term goal (LTG);5 days  -EJ     Row Name 05/03/19 1324          Stairs Goal 1 (PT)    Activity/Assistive Device (Stairs Goal 1, PT)  walker, rolling  -EJ     [place] Level/Cues Needed (Stairs Goal 1, PT)  contact guard assist  -EJ     Number of Stairs (Stairs Goal 1, PT)  1  -EJ     Time Frame (Stairs Goal 1, PT)  long term goal (LTG);5 days  -EJ     Row Name 05/03/19 1324          Stairs Goal 2 (PT)    Activity/Assistive Device (Stairs Goal 2, PT)  using handrail, right;ascending stairs;descending stairs  -EJ     [place] Level/Cues Needed (Stairs Goal 2, PT)  minimum assist (75% or more patient effort)  -EJ     Number of Stairs (Stairs Goal 2, PT)  13  -EJ     Time Frame (Stairs Goal 2, PT)  long term goal (LTG);5 days  -EJ     Row Name 05/03/19 1324          Positioning and Restraints    Pre-Treatment Position  in bed  -EJ     Post Treatment Position  bed  -EJ     In Bed  call light within reach;encouraged to call for assist;exit alarm on;with family/caregiver;supine;notified nsg;side rails up x2;SCD pump applied  -EJ     Row Name 05/03/19 1324          Living Environment    Home Accessibility  stairs to enter home;stairs within home;tub/shower is not walk in  -EJ       User Key  (r) = Recorded By, (t) = Taken By, (c) = Cosigned By    Initials Name Provider Type    EJ Jacqui Mccallum PT Physical Therapist    Trish Olmstead, RN Registered Nurse        Physical Therapy Education     Title: PT OT SLP Therapies (Done)     Topic: Physical Therapy (Done)     Point: Mobility training (Done)     Learning Progress Summary           Patient Acceptance, E, VU,NR by EJ at 5/3/2019  1:24 PM                   Point:  Home exercise program (Done)     Learning Progress Summary           Patient Acceptance, E, VU,NR by CAITLIN at 5/3/2019  1:24 PM                   Point: Body mechanics (Done)     Learning Progress Summary           Patient Acceptance, E, VU,NR by CAITLIN at 5/3/2019  1:24 PM                   Point: Precautions (Done)     Learning Progress Summary           Patient Acceptance, E, VU,NR by CAITLIN at 5/3/2019  1:24 PM                               User Key     Initials Effective Dates Name Provider Type Discipline     11/20/18 -  Jacqui Mccallum PT Physical Therapist PT              PT Recommendation and Plan  Anticipated Discharge Disposition (PT): home with assist  Planned Therapy Interventions (PT Eval): balance training, bed mobility training, gait training, home exercise program, stair training, ROM (range of motion), postural re-education, patient/family education, strengthening, stretching, transfer training  Therapy Frequency (PT Clinical Impression): daily  Outcome Summary/Treatment Plan (PT)  Anticipated Discharge Disposition (PT): home with assist  Referral Needed to Another Service (PT): occupational therapy  Plan of Care Reviewed With: patient  Progress: improving  Outcome Summary: Pt ambulates 450 ft in baron with CGA, needs MIN A for sit<>stand, MOD A for bed mobility. Pt denies headache with all mobility, fatigued after, but tolerates ther ex. Pt is expected to d/c home with assist.   Outcome Measures     Row Name 05/03/19 9486             How much help from another person do you currently need...    Turning from your back to your side while in flat bed without using bedrails?  3  -EJ      Moving from lying on back to sitting on the side of a flat bed without bedrails?  2  -EJ      Moving to and from a bed to a chair (including a wheelchair)?  3  -EJ      Standing up from a chair using your arms (e.g., wheelchair, bedside chair)?  3  -EJ      Climbing 3-5 steps with a railing?  2  -EJ      To walk in hospital  room?  3  -EJ      AM-PAC 6 Clicks Score  16  -EJ         Functional Assessment    Outcome Measure Options  AM-PAC 6 Clicks Basic Mobility (PT)  -EJ        User Key  (r) = Recorded By, (t) = Taken By, (c) = Cosigned By    Initials Name Provider Type    Jacqui Estrada PT Physical Therapist         Time Calculation:   PT Charges     Row Name 05/03/19 1324             Time Calculation    Start Time  1324  -EJ      PT Received On  05/03/19  -EJ      PT Goal Re-Cert Due Date  05/13/19  -         Time Calculation- PT    Total Timed Code Minutes- PT  10 minute(s)  -EJ         Timed Charges    78764 - PT Therapeutic Exercise Minutes  10  -EJ        User Key  (r) = Recorded By, (t) = Taken By, (c) = Cosigned By    Initials Name Provider Type    Jacqui Estrada PT Physical Therapist        Therapy Charges for Today     Code Description Service Date Service Provider Modifiers Qty    77882290500 HC PT EVAL MOD COMPLEXITY 4 5/3/2019 Jacqui Mccallum, PT GP 1    37684634125 HC PT THER PROC EA 15 MIN 5/3/2019 Jacqui Mccallum, PT GP 1          PT G-Codes  Outcome Measure Options: AM-PAC 6 Clicks Basic Mobility (PT)  AM-PAC 6 Clicks Score: 16      Jacqui Arthur, PT  5/3/2019

## 2019-05-04 PROCEDURE — 94799 UNLISTED PULMONARY SVC/PX: CPT

## 2019-05-04 PROCEDURE — 25010000002 ONDANSETRON PER 1 MG: Performed by: NEUROLOGICAL SURGERY

## 2019-05-04 PROCEDURE — 25010000002 MORPHINE PER 10 MG: Performed by: NEUROLOGICAL SURGERY

## 2019-05-04 PROCEDURE — 97535 SELF CARE MNGMENT TRAINING: CPT

## 2019-05-04 PROCEDURE — 97166 OT EVAL MOD COMPLEX 45 MIN: CPT

## 2019-05-04 RX ORDER — OXYCODONE HYDROCHLORIDE 15 MG/1
15 TABLET, FILM COATED, EXTENDED RELEASE ORAL EVERY 12 HOURS SCHEDULED
Qty: 10 TABLET | Refills: 0 | Status: SHIPPED | OUTPATIENT
Start: 2019-05-04 | End: 2020-09-23

## 2019-05-04 RX ORDER — OXYCODONE HYDROCHLORIDE AND ACETAMINOPHEN 5; 325 MG/1; MG/1
1 TABLET ORAL 3 TIMES DAILY PRN
Qty: 50 TABLET | Refills: 0 | Status: SHIPPED | OUTPATIENT
Start: 2019-05-04 | End: 2021-08-04

## 2019-05-04 RX ORDER — IBUPROFEN 600 MG/1
600 TABLET ORAL 3 TIMES DAILY
Qty: 40 TABLET | Refills: 0 | Status: SHIPPED | OUTPATIENT
Start: 2019-05-04 | End: 2019-05-05

## 2019-05-04 RX ADMIN — ACETAMINOPHEN 650 MG: 325 TABLET, FILM COATED ORAL at 20:36

## 2019-05-04 RX ADMIN — DOCUSATE SODIUM 100 MG: 100 CAPSULE, LIQUID FILLED ORAL at 12:05

## 2019-05-04 RX ADMIN — ACETAMINOPHEN 650 MG: 325 TABLET, FILM COATED ORAL at 15:59

## 2019-05-04 RX ADMIN — OXYCODONE HYDROCHLORIDE 15 MG: 15 TABLET, FILM COATED, EXTENDED RELEASE ORAL at 08:16

## 2019-05-04 RX ADMIN — IBUPROFEN 600 MG: 600 TABLET ORAL at 15:59

## 2019-05-04 RX ADMIN — ACETAMINOPHEN 650 MG: 325 TABLET, FILM COATED ORAL at 08:16

## 2019-05-04 RX ADMIN — PRAVASTATIN SODIUM 20 MG: 20 TABLET ORAL at 08:16

## 2019-05-04 RX ADMIN — IBUPROFEN 600 MG: 600 TABLET ORAL at 08:16

## 2019-05-04 RX ADMIN — PANTOPRAZOLE SODIUM 40 MG: 40 TABLET, DELAYED RELEASE ORAL at 08:15

## 2019-05-04 RX ADMIN — SODIUM CHLORIDE, PRESERVATIVE FREE 3 ML: 5 INJECTION INTRAVENOUS at 20:41

## 2019-05-04 RX ADMIN — OXYCODONE HYDROCHLORIDE AND ACETAMINOPHEN 1 TABLET: 7.5; 325 TABLET ORAL at 14:16

## 2019-05-04 RX ADMIN — MORPHINE SULFATE 2 MG: 2 INJECTION, SOLUTION INTRAMUSCULAR; INTRAVENOUS at 11:12

## 2019-05-04 RX ADMIN — OXYCODONE HYDROCHLORIDE 15 MG: 15 TABLET, FILM COATED, EXTENDED RELEASE ORAL at 20:36

## 2019-05-04 RX ADMIN — ASPIRIN 81 MG: 81 TABLET, COATED ORAL at 08:15

## 2019-05-04 RX ADMIN — LATANOPROST 1 DROP: 50 SOLUTION OPHTHALMIC at 20:36

## 2019-05-04 RX ADMIN — SODIUM CHLORIDE, PRESERVATIVE FREE 3 ML: 5 INJECTION INTRAVENOUS at 08:13

## 2019-05-04 RX ADMIN — IBUPROFEN 600 MG: 600 TABLET ORAL at 20:36

## 2019-05-04 RX ADMIN — ONDANSETRON 4 MG: 2 INJECTION INTRAMUSCULAR; INTRAVENOUS at 11:12

## 2019-05-04 RX ADMIN — OXYCODONE HYDROCHLORIDE AND ACETAMINOPHEN 1 TABLET: 7.5; 325 TABLET ORAL at 10:17

## 2019-05-04 NOTE — PLAN OF CARE
Problem: Patient Care Overview  Goal: Plan of Care Review  Outcome: Ongoing (interventions implemented as appropriate)   05/04/19 1206   Plan of Care Review   Progress improving   OTHER   Outcome Summary VSS; pt limited by pain this a.m.; presents with deficits in ADL performance, fxl mobility, occupational endurance. CGA log roll, sidelying<>sit; Min A STS at EOB, CGA at toilet; spinal precautions reviewed. AE issued and training initiated for LBD/LBB. Will benefit from skilled OT services to address deficits, facilitate increased fxl I. Recommend home with assist and HHOT at discharge.   Coping/Psychosocial   Plan of Care Reviewed With patient;spouse

## 2019-05-04 NOTE — PROGRESS NOTES
"NEUROSURGERY PROGRESS NOTE     LOS: 2 days   Patient Care Team:  Cari Rodriguez MD as PCP - General (Internal Medicine)  Cari Rodriguez MD as Referring Physician (Internal Medicine)    Chief Complaint: Back and right leg pain.    POD#: 2 Days Post-Op  Procedures:  L4-5 PLIF.  Right L3-4 foraminotomy.    Interval History:   The patient is voiding independently and is ambulatory.  Patient Complaints: Incisional pain.  Patient Denies: Headache or preoperative right leg pain.    Vital Signs: Blood pressure 111/57, pulse 69, temperature 98 °F (36.7 °C), temperature source Temporal, resp. rate 16, height 162.6 cm (64\"), weight 78.5 kg (173 lb), SpO2 97 %.  Intake/Output:     Intake/Output Summary (Last 24 hours) at 5/4/2019 0808  Last data filed at 5/4/2019 0439  Gross per 24 hour   Intake 1697 ml   Output 2700 ml   Net -1003 ml       Physical Exam:  Patient is awake and alert.  She is sitting up in a chair and eating breakfast.  Dry dressing is in place on her incision.  Motor function is intact in her lower extremities.     Data Review:    Results from last 7 days   Lab Units 05/03/19  0532 04/29/19  1013   WBC 10*3/mm3  --  5.45   HEMOGLOBIN g/dL 11.2* 13.8   HEMATOCRIT % 32.4* 41.1   PLATELETS 10*3/mm3  --  241     Drain output: 150/150ml.    Assessment/Plan:  1.  Lumbar stenosis and radiculopathy status post decompression right L3-4 and L4-5 with L4-5 fusion and stabilization.  2.  Intraoperative dural rent.  3.  History of coronary artery disease.  4.  History of hypertension.  5.  Disposition: Mobilize.  Drain out tomorrow morning.  Home tomorrow if doing well.  She will follow-up in approximately 10 days for suture removal.  She is to contact my office if she develops severe headache or clear wound drainage.  Orders placed.  Rx on chart.        Yoni Borja MD  05/04/19  8:08 AM    "

## 2019-05-04 NOTE — THERAPY EVALUATION
Acute Care - Occupational Therapy Initial Evaluation  [facility]     Patient Name: Eneida Bartholomew  : 1950  MRN: [id number]  Today's Date: 2019  Onset of Illness/Injury or Date of Surgery: 19  Date of Referral to OT: 19  Referring Physician: Dr. Cisse    Admit Date: 2019       ICD-10-CM ICD-9-CM   1. Impaired functional mobility, balance, gait, and endurance Z74.09 V49.89   2. Lumbar stenosis with neurogenic claudication M48.062 724.03   3. Impaired mobility and ADLs Z74.09 799.89     Patient Active Problem List   Diagnosis   • Lumbar stenosis with neurogenic claudication     Past Medical History:   Diagnosis Date   • Arthritis    • Bronchitis    • Coronary artery disease    • Fibromyalgia    • GERD (gastroesophageal reflux disease)    • Glaucoma    • History of migraine    • Hypertension    • UTI (urinary tract infection)      Past Surgical History:   Procedure Laterality Date   • BREAST BIOPSY Right    • BUNIONECTOMY Bilateral     x4   • CATARACT EXTRACTION EXTRACAPSULAR W/ INTRAOCULAR LENS IMPLANTATION Bilateral    • CHOLECYSTECTOMY     • CORONARY ANGIOPLASTY WITH STENT PLACEMENT     • LUMBAR LAMINECTOMY WITH FUSION N/A 2019    Procedure: LUMBAR FUSION DECOMPRESSON WITH PEDICLE SCREWS L4-5, LAMINECTOMY, FORAMINOTOMY RIGHT L3-4;  Surgeon: Yoni Borja MD;  Location: formerly [facility];  Service: Neurosurgery          OT ASSESSMENT FLOWSHEET (last 12 hours)      Occupational Therapy Evaluation     Row Name 19 1125                   OT Evaluation Time/Intention    Subjective Information  complains of;pain  -TA        Document Type  evaluation  -TA        Mode of Treatment  occupational therapy  -TA        Patient Effort  good  -TA        Symptoms Noted During/After Treatment  fatigue  -TA           General Information    Patient Profile Reviewed?  yes  -TA        Onset of Illness/Injury or Date of Surgery  19  -TA        Referring Physician  Dr. Cisse  -TA         Patient Observations  alert;cooperative;agree to therapy  -TA        Patient/Family Observations   present  -TA        General Observations of Patient  Pt supine in bed, SCDs, drain  -TA        Prior Level of Function  min assist:;all household mobility;community mobility;gait;transfer;bed mobility;ADL's;home management All mobility limited by pain  -TA        Equipment Currently Used at Home  walker, rolling;rollator;cane, quad;cane, straight;shower chair  -TA        Pertinent History of Current Functional Problem  Pt presented to hospital for surgical mgt of RLE pain with sensation changes. Pt also had c/o R shoulder pain and has ventriculomegally per imaging. Pt is now s/p L4-L5 PLIF and R L3-4 foraminotomy.   -TA        Existing Precautions/Restrictions  fall;spinal  -TA        Equipment Issued to Patient  bathing equipment;dressing equipment  -TA        Risks Reviewed  patient:;LOB;dizziness;increased discomfort;change in vital signs;increased drainage;lines disloged  -TA        Benefits Reviewed  patient:;improve function;increase independence;increase balance;increase strength;decrease pain;increase knowledge  -TA        Barriers to Rehab  none identified  -TA           Relationship/Environment    Primary Source of Support/Comfort  spouse  -TA        Lives With  spouse;grandchild(cachorro)  -TA        Family Caregiver if Needed  spouse;grandchild(cachorro), adult  -TA           Resource/Environmental Concerns    Current Living Arrangements  home/apartment/condo  -TA           Home Main Entrance    Number of Stairs, Main Entrance  one  -TA           Cognitive Assessment/Intervention- PT/OT    Orientation Status (Cognition)  oriented x 4  -TA        Follows Commands (Cognition)  follows one step commands;over 90% accuracy  -TA        Safety Deficit (Cognitive)  safety precautions awareness;safety precautions follow-through/compliance  -TA           Safety Issues, Functional Mobility    Safety Issues Affecting  Function (Mobility)  safety precaution awareness  -TA        Impairments Affecting Function (Mobility)  endurance/activity tolerance;pain;strength  -TA           Bed Mobility Assessment/Treatment    Bed Mobility Assessment/Treatment  rolling right;sidelying-sit;sit-sidelying  -TA        Rolling Right [place] (Bed Mobility)  contact guard;verbal cues  -TA        Sidelying-Sit [place] (Bed Mobility)  contact guard;verbal cues  -TA        Sit-Sidelying [place] (Bed Mobility)  contact guard;verbal cues  -TA        Bed Mobility, Safety Issues  decreased use of arms for pushing/pulling;decreased use of legs for bridging/pushing  -TA        Assistive Device (Bed Mobility)  bed rails;head of bed elevated  -TA        Comment (Bed Mobility)  Pt independently recalled log roll technique  -TA           Functional Mobility    Functional Mobility- Ind. Level  contact guard assist;verbal cues required  -TA        Functional Mobility- Device  rolling walker  -TA        Functional Mobility-Distance (Feet)  -- EOB>BR>EOB  -TA        Functional Mobility- Safety Issues  balance decreased during turns  -TA        Functional Mobility- Comment  VCs to slow pace for safety  -TA           Transfer Assessment/Treatment    Transfer Assessment/Treatment  sit-stand transfer;stand-sit transfer;toilet transfer  -TA           Sit-Stand Transfer    Sit-Stand [place] (Transfers)  minimum assist (75% patient effort);verbal cues  -TA        Assistive Device (Sit-Stand Transfers)  walker, front-wheeled  -TA           Stand-Sit Transfer    Stand-Sit [place] (Transfers)  contact guard;verbal cues  -TA        Assistive Device (Stand-Sit Transfers)  walker, front-wheeled  -TA           Toilet Transfer    Type (Toilet Transfer)  sit-stand;stand-sit  -TA        [place] Level (Toilet Transfer)  contact guard;verbal cues  -TA           ADL Assessment/Intervention    38371 - OT Self Care/Mgmt Minutes  8  -TA        BADL  Assessment/Intervention  toileting  -TA           Toileting Assessment/Training    [place] Level (Toileting)  adjust/manage clothing;perform perineal hygiene;supervision;verbal cues  -TA        Assistive Devices (Toileting)  grab bar/safety frame  -TA        Toileting Position  unsupported sitting  -TA        Comment (Toileting)  Pt instructed in avoiding twisting with post toilet hygiene  -TA           BADL Safety/Performance    Impairments, BADL Safety/Performance  endurance/activity tolerance;pain;strength  -TA        Skilled BADL Treatment/Intervention  BADL process/adaptation training  -TA           General ROM    GENERAL ROM COMMENTS  BUE AROM WFL  -TA           MMT (Manual Muscle Testing)    General MMT Comments  BUE grossly 4/5  -TA           Motor Assessment/Interventions    Additional Documentation  Balance (Group);Balance Interventions (Group)  -TA           Balance    Balance  dynamic sitting balance;static standing balance  -TA           Dynamic Sitting Balance    Level of [place], Reaches Outside Midline (Sitting, Dynamic Balance)  standby assist  -TA        Sitting Position, Reaches Outside Midline (Sitting, Dynamic Balance)  other (see comments) on toilet  -TA        Comment, Reaches Outside Midline (Sitting, Dynamic Balance)  toileting  -TA           Static Standing Balance    Level of [place] (Supported Standing, Static Balance)  contact guard assist  -TA        Time Able to Maintain Position (Supported Standing, Static Balance)  1 to 2 minutes  -TA        Assistive Device Utilized (Supported Standing, Static Balance)  walker, rolling  -TA        Comment (Supported Standing, Static Balance)  grooming at sink  -TA           Sensory Assessment/Intervention    Sensory General Assessment  no sensation deficits identified;other (see comments) BUE intact  -TA           Positioning and Restraints    Pre-Treatment Position  in bed  -TA        Post Treatment Position  bed  -TA        In Bed   supine;call light within reach;encouraged to call for assist;exit alarm on;with family/caregiver;side rails up x2;legs elevated  -TA           Pain Assessment    Additional Documentation  Pain Scale: Numbers Pre/Post-Treatment (Group)  -TA           Pain Scale: Numbers Pre/Post-Treatment    Pain Scale: Numbers, Pretreatment  4/10  -TA        Pain Scale: Numbers, Post-Treatment  4/10  -TA        Pain Location - Side  Bilateral  -TA        Pain Location - Orientation  lower  -TA        Pain Location  back  -TA        Pre/Post Treatment Pain Comment  premedicated for tx  -TA        Pain Intervention(s)  Repositioned;Ambulation/increased activity  -TA           Wound 05/02/19 1426 Other (See comments) back incision    Wound - Properties Group Date first assessed: 05/02/19  -WS Time first assessed: 1426  -WS Side: Other (See comments)  -WS Location: back  -WS Type: incision  -WS       Coping    Observed Emotional State  calm;cooperative  -TA        Verbalized Emotional State  acceptance  -TA           Plan of Care Review    Plan of Care Reviewed With  patient  -TA           Clinical Impression (OT)    Date of Referral to OT  05/02/19  -TA        OT Diagnosis  Impaired mobility and ADLs  -TA        Patient/Family Goals Statement (OT Eval)  Return home  -TA        Criteria for Skilled Therapeutic Interventions Met (OT Eval)  yes;treatment indicated  -TA        Rehab Potential (OT Eval)  good, to achieve stated therapy goals  -TA        Therapy Frequency (OT Eval)  daily Per priority policy  -TA        Care Plan Review (OT)  evaluation/treatment results reviewed;risks/benefits reviewed;patient/other agree to care plan  -TA        Care Plan Review, Other Participant (OT Eval)  spouse  -TA        Anticipated Discharge Disposition (OT)  home with assist;home with home health  -TA           Vital Signs    Pre Systolic BP Rehab  -- RN cleared pt for tx, VSS  -TA        Pre Patient Position  Supine  -TA        Intra Patient  Position  Standing  -TA        Post Patient Position  Supine  -TA           Planned OT Interventions    Planned Therapy Interventions (OT Eval)  activity tolerance training;adaptive equipment training;BADL retraining;occupation/activity based interventions;strengthening exercise;transfer/mobility retraining  -TA           OT Goals    Bed Mobility Goal Selection (OT)  bed mobility, OT goal 1  -TA        Transfer Goal Selection (OT)  transfer, OT goal 1  -TA        Dressing Goal Selection (OT)  dressing, OT goal 1  -TA        Toileting Goal Selection (OT)  toileting, OT goal 1  -TA           Bed Mobility Goal 1 (OT)    Activity/Assistive Device (Bed Mobility Goal 1, OT)  bed mobility activities, all  -TA        [place] Level/Cues Needed (Bed Mobility Goal 1, OT)  conditional independence  -TA        Time Frame (Bed Mobility Goal 1, OT)  by discharge  -TA        Progress/Outcomes (Bed Mobility Goal 1, OT)  goal ongoing  -TA           Transfer Goal 1 (OT)    Activity/Assistive Device (Transfer Goal 1, OT)  sit-to-stand/stand-to-sit;bed-to-chair/chair-to-bed;toilet  -TA        [place] Level/Cues Needed (Transfer Goal 1, OT)  supervision required  -TA        Time Frame (Transfer Goal 1, OT)  by discharge  -TA        Progress/Outcome (Transfer Goal 1, OT)  goal ongoing  -TA           Dressing Goal 1 (OT)    Activity/Assistive Device (Dressing Goal 1, OT)  lower body dressing;reacher;sock-aid  -TA        [place]/Cues Needed (Dressing Goal 1, OT)  minimum assist (75% or more patient effort);verbal cues required  -TA        Time Frame (Dressing Goal 1, OT)  by discharge  -TA        Progress/Outcome (Dressing Goal 1, OT)  goal ongoing  -TA           Toileting Goal 1 (OT)    Activity/Device (Toileting Goal 1, OT)  adjust/manage clothing;perform perineal hygiene;grab bar/safety frame  -TA        [place] Level/Cues Needed (Toileting Goal 1, OT)  conditional independence  -TA        Time Frame (Toileting Goal  1, OT)  by discharge  -TA        Progress/Outcome (Toileting Goal 1, OT)  goal ongoing  -TA           Living Environment    Home Accessibility  stairs to enter home  -TA          User Key  (r) = Recorded By, (t) = Taken By, (c) = Cosigned By    Initials Name Effective Dates    TA Vinod Ruiz OT 03/14/16 -     Trish Olmstead RN 08/25/17 -          Occupational Therapy Education     Title: PT OT SLP Therapies (In Progress)     Topic: Occupational Therapy (In Progress)     Point: ADL training (Done)     Description: Instruct learner(s) on proper safety adaptation and remediation techniques during self care or transfers.   Instruct in proper use of assistive devices.    Learning Progress Summary           Patient Acceptance, E, VU by TA at 5/4/2019 12:05 PM    Comment:  Reinforced spinal precautions; body mechanics with bed mobility/fxl transfers; AE issued and training initiated; reinforced need for call for assist with OOB activities.   Significant Other Acceptance, E, VU by TA at 5/4/2019 12:05 PM    Comment:  Reinforced spinal precautions; body mechanics with bed mobility/fxl transfers; AE issued and training initiated; reinforced need for call for assist with OOB activities.                   Point: Precautions (Done)     Description: Instruct learner(s) on prescribed precautions during self-care and functional transfers.    Learning Progress Summary           Patient Acceptance, E, VU by TA at 5/4/2019 12:05 PM    Comment:  Reinforced spinal precautions; body mechanics with bed mobility/fxl transfers; AE issued and training initiated; reinforced need for call for assist with OOB activities.   Significant Other Acceptance, E, VU by TA at 5/4/2019 12:05 PM    Comment:  Reinforced spinal precautions; body mechanics with bed mobility/fxl transfers; AE issued and training initiated; reinforced need for call for assist with OOB activities.                   Point: Body mechanics (Done)     Description:  Instruct learner(s) on proper positioning and spine alignment during self-care, functional mobility activities and/or exercises.    Learning Progress Summary           Patient Acceptance, E, VU by TA at 5/4/2019 12:05 PM    Comment:  Reinforced spinal precautions; body mechanics with bed mobility/fxl transfers; AE issued and training initiated; reinforced need for call for assist with OOB activities.   Significant Other Acceptance, E, VU by TA at 5/4/2019 12:05 PM    Comment:  Reinforced spinal precautions; body mechanics with bed mobility/fxl transfers; AE issued and training initiated; reinforced need for call for assist with OOB activities.                               User Key     Initials Effective Dates Name Provider Type Discipline    MYAH 03/14/16 -  Vinod Ruiz, OT Occupational Therapist OT                  OT Recommendation and Plan  Outcome Summary/Treatment Plan (OT)  Anticipated Discharge Disposition (OT): home with assist, home with home health  Planned Therapy Interventions (OT Eval): activity tolerance training, adaptive equipment training, BADL retraining, occupation/activity based interventions, strengthening exercise, transfer/mobility retraining  Therapy Frequency (OT Eval): daily(Per priority policy)  Plan of Care Review  Plan of Care Reviewed With: patient, spouse  Plan of Care Reviewed With: patient, spouse  Outcome Summary: VSS; pt limited by pain this a.m.; presents with deficits in ADL performance, fxl mobility, occupational endurance. CGA log roll, sidelying<>sit; Min A STS at EOB, CGA at toilet; spinal precautions reviewed. AE issued and training initiated for LBD/LBB. Will benefit from skilled OT services to address deficits, facilitate increased fxl I. Recommend home with assist and HHOT at discharge.    Outcome Measures     Row Name 05/04/19 1125 05/03/19 1324          How much help from another person do you currently need...    Turning from your back to your side while in flat  bed without using bedrails?  --  3  -EJ     Moving from lying on back to sitting on the side of a flat bed without bedrails?  --  2  -EJ     Moving to and from a bed to a chair (including a wheelchair)?  --  3  -EJ     Standing up from a chair using your arms (e.g., wheelchair, bedside chair)?  --  3  -EJ     Climbing 3-5 steps with a railing?  --  2  -EJ     To walk in hospital room?  --  3  -EJ     AM-PAC 6 Clicks Score  --  16  -EJ        How much help from another is currently needed...    Putting on and taking off regular lower body clothing?  3  -TA  --     Bathing (including washing, rinsing, and drying)  3  -TA  --     Toileting (which includes using toilet bed pan or urinal)  3  -TA  --     Putting on and taking off regular upper body clothing  3  -TA  --     Taking care of personal grooming (such as brushing teeth)  3  -TA  --     Eating meals  4  -TA  --     Score  19  -TA  --        Functional Assessment    Outcome Measure Options  AM-PAC 6 Clicks Daily Activity (OT)  -TA  AM-PAC 6 Clicks Basic Mobility (PT)  -EJ       User Key  (r) = Recorded By, (t) = Taken By, (c) = Cosigned By    Initials Name Provider Type    Jacqui Estrada, PT Physical Therapist    TA Vinod Ruiz, OT Occupational Therapist          Time Calculation:   Time Calculation- OT     Row Name 05/04/19 1209 05/04/19 1125          Time Calculation- OT    OT Start Time  1125 ttc 8 minutes  -TA  --     Total Timed Code Minutes- OT  8 minute(s)  -TA  --     OT Received On  05/04/19  -TA  --     OT Goal Re-Cert Due Date  05/14/19  -TA  --        Timed Charges    32205 - OT Self Care/Mgmt Minutes  --  8  -TA       User Key  (r) = Recorded By, (t) = Taken By, (c) = Cosigned By    Initials Name Provider Type    Vinod Dorman, OT Occupational Therapist        Therapy Charges for Today     Code Description Service Date Service Provider Modifiers Qty    [id number]  OT EVAL MOD COMPLEXITY 3 5/4/2019 Vinod Ruiz, OT GO  1    [id number] HC OT SELF CARE/MGMT/TRAIN EA 15 MIN 5/4/2019 Vinod Ruiz, OT GO 1               Vinod Ruiz OT  5/4/2019

## 2019-05-04 NOTE — PLAN OF CARE
Problem: Patient Care Overview  Goal: Plan of Care Review  Outcome: Ongoing (interventions implemented as appropriate)   05/04/19 1814   Plan of Care Review   Progress improving   OTHER   Outcome Summary Vitals WNL. Pt tolerates ambulation in room and in the baron with assist X 1 and walker. Pt denies numbness or tingling. Chief complaint has been back pain. Pain has ranged from 2-8/10. PRN percocet and morphine given in addition to scheduled ibuprofen and tylenol. Pt reports improvement with these interventions. Pt up to chair for a short period of time this AM, but reported an increase in pain afterwards. Pt voids spontaneously. Pt reports an improvement in urinary urgency and frequency after stopping IV fluids. Coverderm is dry and intact with a small amount of sanguineous drainage is present. 75 mL of sanguineous drainage out of Hemovac. Anticipate D/C home tomorrow.    Coping/Psychosocial   Plan of Care Reviewed With patient;family       Problem: Pain, Acute (Adult)  Goal: Acceptable Pain Control/Comfort Level  Outcome: Ongoing (interventions implemented as appropriate)   05/04/19 1814   Pain, Acute (Adult)   Acceptable Pain Control/Comfort Level making progress toward outcome       Problem: Skin Injury Risk (Adult)  Goal: Skin Health and Integrity  Outcome: Ongoing (interventions implemented as appropriate)   05/04/19 1814   Skin Injury Risk (Adult)   Skin Health and Integrity making progress toward outcome       Problem: Fall Risk (Adult)  Goal: Absence of Fall  Outcome: Ongoing (interventions implemented as appropriate)   05/04/19 1814   Fall Risk (Adult)   Absence of Fall achieves outcome       Problem: Laminectomy/Foraminotomy/Discectomy (Adult)  Goal: Signs and Symptoms of Listed Potential Problems Will be Absent, Minimized or Managed (Laminectomy/Foraminotomy/Discectomy)  Outcome: Ongoing (interventions implemented as appropriate)   05/04/19 1814   Goal/Outcome Evaluation   Problems Assessed  (Laminectomy/Laminotomy/Discectomy) all   Problems Present (Laminectomy/otomy) cerebrospinal fluid leak;functional decline/self-care deficit;pain

## 2019-05-05 VITALS
DIASTOLIC BLOOD PRESSURE: 63 MMHG | HEART RATE: 83 BPM | SYSTOLIC BLOOD PRESSURE: 143 MMHG | WEIGHT: 173 LBS | BODY MASS INDEX: 29.53 KG/M2 | HEIGHT: 64 IN | TEMPERATURE: 98.2 F | OXYGEN SATURATION: 96 % | RESPIRATION RATE: 16 BRPM

## 2019-05-05 PROCEDURE — 97110 THERAPEUTIC EXERCISES: CPT

## 2019-05-05 PROCEDURE — 97116 GAIT TRAINING THERAPY: CPT

## 2019-05-05 PROCEDURE — 99024 POSTOP FOLLOW-UP VISIT: CPT | Performed by: PHYSICIAN ASSISTANT

## 2019-05-05 RX ORDER — SODIUM PHOSPHATE, DIBASIC AND SODIUM PHOSPHATE, MONOBASIC 7; 19 G/133ML; G/133ML
1 ENEMA RECTAL ONCE
Status: DISCONTINUED | OUTPATIENT
Start: 2019-05-05 | End: 2019-05-05 | Stop reason: HOSPADM

## 2019-05-05 RX ORDER — IBUPROFEN 600 MG/1
600 TABLET ORAL 3 TIMES DAILY
Qty: 40 TABLET | Refills: 0 | Status: SHIPPED | OUTPATIENT
Start: 2019-05-05 | End: 2021-07-09

## 2019-05-05 RX ADMIN — PRAVASTATIN SODIUM 20 MG: 20 TABLET ORAL at 08:11

## 2019-05-05 RX ADMIN — ASPIRIN 81 MG: 81 TABLET, COATED ORAL at 08:11

## 2019-05-05 RX ADMIN — LISINOPRIL 40 MG: 40 TABLET ORAL at 08:11

## 2019-05-05 RX ADMIN — SODIUM CHLORIDE, PRESERVATIVE FREE 3 ML: 5 INJECTION INTRAVENOUS at 08:10

## 2019-05-05 RX ADMIN — PANTOPRAZOLE SODIUM 40 MG: 40 TABLET, DELAYED RELEASE ORAL at 06:40

## 2019-05-05 RX ADMIN — HYDROCHLOROTHIAZIDE 25 MG: 25 TABLET ORAL at 08:11

## 2019-05-05 RX ADMIN — AMLODIPINE BESYLATE 10 MG: 10 TABLET ORAL at 08:11

## 2019-05-05 RX ADMIN — BACLOFEN 10 MG: 10 TABLET ORAL at 06:43

## 2019-05-05 RX ADMIN — METOPROLOL SUCCINATE 100 MG: 100 TABLET, EXTENDED RELEASE ORAL at 08:11

## 2019-05-05 RX ADMIN — Medication 10 MG: at 11:03

## 2019-05-05 RX ADMIN — MAGNESIUM HYDROXIDE 10 ML: 2400 SUSPENSION ORAL at 08:39

## 2019-05-05 RX ADMIN — OXYCODONE HYDROCHLORIDE 15 MG: 15 TABLET, FILM COATED, EXTENDED RELEASE ORAL at 08:11

## 2019-05-05 RX ADMIN — IBUPROFEN 600 MG: 600 TABLET ORAL at 08:11

## 2019-05-05 RX ADMIN — ACETAMINOPHEN 650 MG: 325 TABLET, FILM COATED ORAL at 08:11

## 2019-05-05 RX ADMIN — OXYCODONE HYDROCHLORIDE AND ACETAMINOPHEN 1 TABLET: 7.5; 325 TABLET ORAL at 06:40

## 2019-05-05 NOTE — NURSING NOTE
Reviewed discharge instructions w/ pt and spouse.  Medications delivered to bedside from [facility] pharm.  PIV removed prior to discharge.  Pt taken to private vehicle via hospital transport.

## 2019-05-05 NOTE — PLAN OF CARE
Problem: Patient Care Overview  Goal: Plan of Care Review  Outcome: Ongoing (interventions implemented as appropriate)   05/05/19 0518   Plan of Care Review   Progress improving   OTHER   Outcome Summary Pt had overall good night, appears to have slept well. Back pain controlled with PO meds. Hemovac drain with approx 40 ml output. Ambulated in baron with walker and standby assist. Pt alert, anticipating discharge to home on Sunday. VSS   Coping/Psychosocial   Plan of Care Reviewed With patient       Problem: Pain, Acute (Adult)  Goal: Identify Related Risk Factors and Signs and Symptoms  Outcome: Ongoing (interventions implemented as appropriate)

## 2019-05-05 NOTE — PLAN OF CARE
Problem: Patient Care Overview  Goal: Plan of Care Review  Outcome: Ongoing (interventions implemented as appropriate)   05/05/19 7947   Plan of Care Review   Progress improving   OTHER   Outcome Summary Patient ambulated 500 feet with RW and step through gait pattern, limited by pain. Nearly independent with all mobility. Patient has been d/c home with family.    Coping/Psychosocial   Plan of Care Reviewed With patient

## 2019-05-05 NOTE — THERAPY DISCHARGE NOTE
Acute Care - Physical Therapy Treatment Note/Discharge   Javier     Patient Name: Eneida Bartholomew  : 1950  MRN: [id number]  Today's Date: 2019  Onset of Illness/Injury or Date of Surgery: 19  Date of Referral to PT: 19  Referring Physician: Dr. Cisse    Admit Date: 2019    Visit Dx:    ICD-10-CM ICD-9-CM   1. Impaired functional mobility, balance, gait, and endurance Z74.09 V49.89   2. Lumbar stenosis with neurogenic claudication M48.062 724.03   3. Impaired mobility and ADLs Z74.09 799.89     Patient Active Problem List   Diagnosis   • Lumbar stenosis with neurogenic claudication       Physical Therapy Education     Title: PT OT SLP Therapies (In Progress)     Topic: Physical Therapy (Done)     Point: Mobility training (Done)     Learning Progress Summary           Patient Acceptance, E,D,H, VU,DU by LR at 2019  8:53 AM    Comment:  Issued and reviewed written/illustrated HEP. Educated on spinal precautions, correct log rolling technique, correct sit<->stand t/f technique, correct gait mechanics, correct stair training technique, and correct car t/f technique.    Acceptance, E, VU,NR by CAITLIN at 5/3/2019  1:24 PM                   Point: Home exercise program (Done)     Learning Progress Summary           Patient Acceptance, E,D,H, VU,DU by LR at 2019  8:53 AM    Comment:  Issued and reviewed written/illustrated HEP. Educated on spinal precautions, correct log rolling technique, correct sit<->stand t/f technique, correct gait mechanics, correct stair training technique, and correct car t/f technique.    Acceptance, E, VU,NR by CAITLIN at 5/3/2019  1:24 PM                   Point: Body mechanics (Done)     Learning Progress Summary           Patient Acceptance, E,D,H, VU,DU by HALINA at 2019  8:53 AM    Comment:  Issued and reviewed written/illustrated HEP. Educated on spinal precautions, correct log rolling technique, correct sit<->stand t/f technique, correct gait mechanics,  correct stair training technique, and correct car t/f technique.    Acceptance, KIYA, XENA,NR by CAITLIN at 5/3/2019  1:24 PM                   Point: Precautions (Done)     Learning Progress Summary           Patient Acceptance, KIYA,ORACIO,H, XENA,DU by HALINA at 5/5/2019  8:53 AM    Comment:  Issued and reviewed written/illustrated HEP. Educated on spinal precautions, correct log rolling technique, correct sit<->stand t/f technique, correct gait mechanics, correct stair training technique, and correct car t/f technique.    Acceptance, KIYA, XENA,NR by CAITLIN at 5/3/2019  1:24 PM                               User Key     Initials Effective Dates Name Provider Type Discipline     11/20/18 -  Jacqui Mccallum, PT Physical Therapist PT    LR 06/19/15 -  Virgen Osman, PT Physical Therapist PT              Rehab Goal Summary     Row Name 05/05/19 0853             Physical Therapy Goals    Bed Mobility Goal Selection (PT)  bed mobility, PT goal 1  -LR      Transfer Goal Selection (PT)  transfer, PT goal 1  -LR      Gait Training Goal Selection (PT)  gait training, PT goal 1  -LR      Stairs Goal Selection (PT)  stairs, PT goal 1;stairs, PT goal 2  -LR         Bed Mobility Goal 1 (PT)    Activity/Assistive Device (Bed Mobility Goal 1, PT)  sit to supine/supine to sit  -LR      [place] Level/Cues Needed (Bed Mobility Goal 1, PT)  conditional independence  -LR      Time Frame (Bed Mobility Goal 1, PT)  long term goal (LTG);5 days  -LR      Progress/Outcomes (Bed Mobility Goal 1, PT)  goal partially met;discharged from facility achieved for sit to supine.   -LR         Transfer Goal 1 (PT)    Activity/Assistive Device (Transfer Goal 1, PT)  sit-to-stand/stand-to-sit  -LR      [place] Level/Cues Needed (Transfer Goal 1, PT)  conditional independence  -LR      Time Frame (Transfer Goal 1, PT)  long term goal (LTG);5 days  -LR      Progress/Outcome (Transfer Goal 1, PT)  goal not met;discharged from facility  -LR         Gait Training  Goal 1 (PT)    Activity/Assistive Device (Gait Training Goal 1, PT)  gait (walking locomotion);walker, rolling  -LR      [place] Level (Gait Training Goal 1, PT)  conditional independence  -LR      Distance (Gait Goal 1, PT)  700  -LR      Time Frame (Gait Training Goal 1, PT)  long term goal (LTG);5 days  -LR      Progress/Outcome (Gait Training Goal 1, PT)  goal not met;medical status inhibited participation  -LR         Stairs Goal 1 (PT)    Activity/Assistive Device (Stairs Goal 1, PT)  walker, rolling  -LR      [place] Level/Cues Needed (Stairs Goal 1, PT)  contact guard assist  -LR      Number of Stairs (Stairs Goal 1, PT)  1  -LR      Time Frame (Stairs Goal 1, PT)  long term goal (LTG);5 days  -LR      Progress/Outcome (Stairs Goal 1, PT)  goal not met;discharged from facility  -LR         Stairs Goal 2 (PT)    Activity/Assistive Device (Stairs Goal 2, PT)  using handrail, right;ascending stairs;descending stairs  -LR      [place] Level/Cues Needed (Stairs Goal 2, PT)  minimum assist (75% or more patient effort)  -LR      Number of Stairs (Stairs Goal 2, PT)  13  -LR      Time Frame (Stairs Goal 2, PT)  long term goal (LTG);5 days  -LR      Progress/Outcome (Stairs Goal 2, PT)  goal not met;goal no longer appropriate;discharged from facility patient plans to stay on first floor of home.   -LR        User Key  (r) = Recorded By, (t) = Taken By, (c) = Cosigned By    Initials Name Provider Type Discipline    LR Virgen Osman, PT Physical Therapist PT        Therapy Treatment  Rehabilitation Treatment Summary     Row Name 05/05/19 0936 05/05/19 0853          Treatment Time/Intention    Discipline  --  -LR  physical therapist  -LR     Document Type  --  -LR  therapy note (daily note);discharge evaluation/summary  -LR     Subjective Information  --  -LR  complains of;pain;swelling improved compared to yesterday  -LR     Mode of Treatment  --  -LR  physical therapy;individual therapy  -LR      Patient/Family Observations  --  -LR  Patient up in bathroom with nursing on arrival.   -LR     Care Plan Review  --  -LR  care plan/treatment goals reviewed;risks/benefits reviewed;current/potential barriers reviewed;patient/other agree to care plan  -LR     Patient Effort  --  -LR  excellent  -LR     Existing Precautions/Restrictions  --  -LR  fall;spinal  -LR     Recorded by [LR] Virgen Osman, PT 05/05/19 1202 [LR] Virgen Osman, PT 05/05/19 1202     Row Name 05/05/19 0936 05/05/19 0853          Cognitive Assessment/Intervention- PT/OT    Orientation Status (Cognition)  --  -LR  oriented x 4  -LR     Follows Commands (Cognition)  --  -LR  WFL;follows one step commands;over 90% accuracy;verbal cues/prompting required;repetition of directions required  -LR     Safety Deficit (Cognitive)  --  -LR  mild deficit;safety precautions awareness;safety precautions follow-through/compliance  -LR     Recorded by [LR] Virgen Osman, PT 05/05/19 1202 [LR] Virgen Osman, PT 05/05/19 1202     Row Name 05/05/19 0936 05/05/19 0853          Safety Issues, Functional Mobility    Safety Issues Affecting Function (Mobility)  --  -LR  safety precaution awareness;sequencing abilities;safety precautions follow-through/compliance  -LR     Recorded by [LR] Virgen Osman, PT 05/05/19 1202 [LR] Virgen Osman, PT 05/05/19 1202     Row Name 05/05/19 0936 05/05/19 0853          Bed Mobility Assessment/Treatment    Bed Mobility Assessment/Treatment  --  -LR  sit-supine  -LR     Sit-Supine [place] (Bed Mobility)  --  -LR  verbal cues;conditional independence  -LR     Sidelying-Sit [place] (Bed Mobility)  --  -LR  not tested Up in bathroom on arrival.   -LR     Bed Mobility, Safety Issues  --  -LR  decreased use of legs for bridging/pushing  -LR     Assistive Device (Bed Mobility)  --  -LR  head of bed elevated;bed rails  -LR     Comment (Bed Mobility)  --  -LR  Verbal cues for  correct log rolling technique and to keep hips and shoulders aligned throughout.   -LR     Recorded by [LR] Virgen Osman, PT 05/05/19 1202 [LR] Virgen Osman, PT 05/05/19 1202     Row Name 05/05/19 0936 05/05/19 0853          Transfer Assessment/Treatment    Transfer Assessment/Treatment  --  -LR  sit-stand transfer;stand-sit transfer  -LR     Comment (Transfers)  --  -LR  Verbal cues for correct hand placement with t/f and to limit trunk flexion during t/f.   -LR     Recorded by [LR] Virgen Osman, PT 05/05/19 1202 [LR] Virgen Osman, PT 05/05/19 1202     Row Name 05/05/19 0936 05/05/19 0853          Sit-Stand Transfer    Sit-Stand [place] (Transfers)  --  -LR  verbal cues;supervision  -LR     Assistive Device (Sit-Stand Transfers)  --  -LR  walker, front-wheeled  -LR     Recorded by [LR] Virgen Osman, PT 05/05/19 1202 [LR] Virgen Osman, PT 05/05/19 1202     Row Name 05/05/19 0936 05/05/19 0853          Stand-Sit Transfer    Stand-Sit [place] (Transfers)  --  -LR  verbal cues;supervision  -LR     Assistive Device (Stand-Sit Transfers)  --  -LR  walker, front-wheeled  -LR     Recorded by [LR] Virgen Osman, PT 05/05/19 1202 [LR] Virgen Osman, PT 05/05/19 1202     Row Name 05/05/19 0936 05/05/19 0853          Gait/Stairs Assessment/Training    63828 - Gait Training Minutes   --  -LR  14  -LR     [place] Level (Gait)  --  -LR  verbal cues;contact guard  -LR     Assistive Device (Gait)  --  -LR  walker, front-wheeled  -LR     Distance in Feet (Gait)  --  -LR  500  -LR     Pattern (Gait)  --  -LR  step-through  -LR     Deviations/Abnormal Patterns (Gait)  --  -LR  bilateral deviations;abigail decreased;gait speed decreased;stride length decreased  -LR     Bilateral Gait Deviations  --  -LR  forward flexed posture;heel strike decreased  -LR     Comment (Gait/Stairs)  --  -LR  Patient ambulated with step through gait pattern at slow  pace. Verbal cues to keep RW closer, for upright posture, shoulders back, hips forward, increased LE weight bearing, and decreased UE weight bearing. Improved with cues for correction. Gait limited by pain and fatigue. Instructed patient on how to climb 1 step backwards with use of RW for support. Educated patient to step up backwards with strong LE first and down with weak LE first.   -LR     Recorded by [LR] Virgen Osman, PT 05/05/19 1202 [LR] Virgen Osman, PT 05/05/19 1202     Row Name 05/05/19 0936 05/05/19 0853          Therapeutic Exercise    40960 - PT Therapeutic Exercise Minutes  --  -LR  12  -LR     Recorded by [LR] Virgen Osman, PT 05/05/19 1202 [LR] Virgen Osman, PT 05/05/19 1202     Row Name 05/05/19 0936 05/05/19 0853          Therapeutic Exercise    Lower Extremity (Therapeutic Exercise)  --  -LR  gluteal sets;heel slides, bilateral;quad sets, bilateral  -LR     Lower Extremity Range of Motion (Therapeutic Exercise)  --  -LR  hip internal/external rotation, bilateral;ankle dorsiflexion/plantar flexion, bilateral  -LR     Core Strength (Therapeutic Exercise)  --  -LR  abdominal bracing;other (see comments) ab sets, BKFO, arms overhead  -LR     Sets/Reps (Therapeutic Exercise)  --  -LR  cues for technique; no assist required.   -LR     Recorded by [LR] Virgen Osman, PT 05/05/19 1202 [LR] Virgen Osman, PT 05/05/19 1202     Row Name 05/05/19 0936 05/05/19 0853          Positioning and Restraints    Pre-Treatment Position  --  -LR  bathroom  -LR     Post Treatment Position  --  -LR  bed  -LR     In Bed  --  -LR  notified nsg;supine;call light within reach;encouraged to call for assist;exit alarm on;side rails up x2  -LR     Recorded by [LR] Virgen Osman, PT 05/05/19 1202 [LR] Virgen Osman, PT 05/05/19 1202     Row Name 05/05/19 0936 05/05/19 0853          Pain Assessment    Additional Documentation  --  -LR  Pain Scale: Numbers  Pre/Post-Treatment (Group)  -LR     Recorded by [LR] Virgen Osman, PT 05/05/19 1202 [LR] Virgen Osman, PT 05/05/19 1202     Row Name 05/05/19 0936 05/05/19 0853          Pain Scale: Numbers Pre/Post-Treatment    Pain Scale: Numbers, Pretreatment  --  -LR  1/10  -LR     Pain Scale: Numbers, Post-Treatment  --  -LR  2/10  -LR     Pain Location - Orientation  --  -LR  lower  -LR     Pain Location  --  -LR  back  -LR     Pain Intervention(s)  --  -LR  Repositioned;Ambulation/increased activity  -LR     Recorded by [LR] Virgen Osman, PT 05/05/19 1202 [LR] Virgen Osman, PT 05/05/19 1202     Row Name                Wound 05/02/19 1426 Other (See comments) back incision    Wound - Properties Group Date first assessed: 05/02/19 [WS] Time first assessed: 1426 [WS] Side: Other (See comments) [WS] Location: back [WS] Type: incision [WS] Recorded by:  [WS] Trish Delcid RN 05/02/19 1426    Row Name 05/05/19 0936 05/05/19 0853          Coping    Observed Emotional State  --  -LR  accepting;cooperative  -LR     Verbalized Emotional State  --  -LR  acceptance  -LR     Recorded by [LR] Virgen Osman, PT 05/05/19 1202 [LR] Virgen Osman, PT 05/05/19 1202     Row Name 05/05/19 0936             Plan of Care Review    Plan of Care Reviewed With  --  -LR      Recorded by [LR] Virgen Osman, PT 05/05/19 1202      Row Name 05/05/19 0936 05/05/19 0853          Outcome Summary/Treatment Plan (PT)    Daily Summary of Progress (PT)  --  -LR  progress toward functional goals as expected  -LR     Recorded by [LR] Virgen Osman, PT 05/05/19 1202 [LR] Virgen Osman, PT 05/05/19 1202       User Key  (r) = Recorded By, (t) = Taken By, (c) = Cosigned By    Initials Name Effective Dates Discipline    LR Virgen Osman, PT 06/19/15 -  PT    Trish Olmstead, RN 08/25/17 -  Nurse        Wound 05/02/19 9675 Other (See comments) back incision (Active)    Dressing Appearance dry;intact;dried drainage 5/4/2019  8:35 PM   Closure TAYE 5/4/2019  8:35 PM   Base dressing in place, unable to visualize 5/4/2019  8:35 PM   Drainage Characteristics/Odor serosanguineous 5/4/2019  8:35 PM   Dressing Care, Wound border dressing 5/4/2019  8:35 PM       PT Recommendation and Plan  Anticipated Discharge Disposition (PT): home with assist  Outcome Summary/Treatment Plan (PT)  Daily Summary of Progress (PT): progress toward functional goals as expected  Anticipated Discharge Disposition (PT): home with assist  Plan of Care Reviewed With: patient  Progress: improving  Outcome Summary: Patient ambulated 500 feet with RW and step through gait pattern, limited by pain. Nearly independent with all mobility. Patient  has been d/c home with family.     Outcome Measures     Row Name 05/05/19 0853 05/04/19 1125 05/03/19 1324       How much help from another person do you currently need...    Turning from your back to your side while in flat bed without using bedrails?  4  -LR  --  3  -EJ    Moving from lying on back to sitting on the side of a flat bed without bedrails?  4  -LR  --  2  -EJ    Moving to and from a bed to a chair (including a wheelchair)?  3  -LR  --  3  -EJ    Standing up from a chair using your arms (e.g., wheelchair, bedside chair)?  3  -LR  --  3  -EJ    Climbing 3-5 steps with a railing?  3  -LR  --  2  -EJ    To walk in hospital room?  3  -LR  --  3  -EJ    AM-PAC 6 Clicks Score  20  -LR  --  16  -EJ       How much help from another is currently needed...    Putting on and taking off regular lower body clothing?  --  3  -TA  --    Bathing (including washing, rinsing, and drying)  --  3  -TA  --    Toileting (which includes using toilet bed pan or urinal)  --  3  -TA  --    Putting on and taking off regular upper body clothing  --  3  -TA  --    Taking care of personal grooming (such as brushing teeth)  --  3  -TA  --    Eating meals  --  4  -TA  --    Score  --  19  -TA   --       Functional Assessment    Outcome Measure Options  AM-PAC 6 Clicks Basic Mobility (PT)  -LR  AM-PAC 6 Clicks Daily Activity (OT)  -TA  AM-PAC 6 Clicks Basic Mobility (PT)  -EJ      User Key  (r) = Recorded By, (t) = Taken By, (c) = Cosigned By    Initials Name Provider Type    Jacqui Estrada, PT Physical Therapist    LR Virgen Osman, PT Physical Therapist    TA Vinod Ruiz, OT Occupational Therapist           Time Calculation:   PT Charges     Row Name 05/05/19 0936 05/05/19 0853          Time Calculation    Start Time  --  -LR  0853  -LR     PT Received On  --  -LR  05/05/19  -LR     PT Goal Re-Cert Due Date  --  -LR  05/13/19  -LR        Time Calculation- PT    Total Timed Code Minutes- PT  --  -LR  26 minute(s)  -LR        Timed Charges    90180 - PT Therapeutic Exercise Minutes  --  -LR  12  -LR     36347 - Gait Training Minutes   --  -LR  14  -LR       User Key  (r) = Recorded By, (t) = Taken By, (c) = Cosigned By    Initials Name Provider Type    Virgen Julio, PT Physical Therapist        Therapy Charges for Today     Code Description Service Date Service Provider Modifiers Qty    45419613042 HC PT THER PROC EA 15 MIN 5/5/2019 Virgen Osman, PT GP 1    42044783187 HC GAIT TRAINING EA 15 MIN 5/5/2019 Virgen Osman, PT GP 1          PT G-Codes  Outcome Measure Options: AM-PAC 6 Clicks Basic Mobility (PT)  AM-PAC 6 Clicks Score: 20  Score: 19    PT Discharge Summary  Anticipated Discharge Disposition (PT): home with assist  Reason for Discharge: Discharge from facility  Outcomes Achieved: Patient able to partially acheive established goals  Discharge Destination: Home with assist    Virgen Osman, PT  5/5/2019

## 2019-05-05 NOTE — PROGRESS NOTES
"NEUROSURGERY PROGRESS NOTE     LOS: 3 days   Patient Care Team:  Cari Rodriguez MD as PCP - General (Internal Medicine)  Cari Rodriguez MD as Referring Physician (Internal Medicine)    Chief Complaint: Back and right leg pain     Interval History:   Patient Complaints: No complaints  Patient Denies: Leg pain, back pain, weakness, numbness, headache, nausea, vomiting     Vital Signs: Blood pressure 143/63, pulse 83, temperature 98.2 °F (36.8 °C), temperature source Oral, resp. rate 16, height 162.6 cm (64\"), weight 78.5 kg (173 lb), SpO2 96 %.  Intake/Output:     Intake/Output Summary (Last 24 hours) at 5/5/2019 1025  Last data filed at 5/5/2019 0500  Gross per 24 hour   Intake 500 ml   Output 2840 ml   Net -2340 ml       Physical Exam:  Patient is awake, alert and oriented resting in bed in NAD  She moves all extremities to command   Incision remains clean, dry and intact      Data Review:    Results from last 7 days   Lab Units 05/03/19  0532 04/29/19  1013   WBC 10*3/mm3  --  5.45   HEMOGLOBIN g/dL 11.2* 13.8   HEMATOCRIT % 32.4* 41.1   PLATELETS 10*3/mm3  --  241     WILLIAM drain removed this morning     Assessment/Plan:  1.  Lumbar stenosis and radiculopathy status post decompression right L3-4 and L4-5 with L4-5 fusion and stabilization.  2.  Intraoperative dural rent.  3.  History of coronary artery disease.  4.  History of hypertension.  5.  Disposition: Patient may be discharged today after she has a bowel movement. She has been given Milk of Magnesia with no BM. Ordered a suppository. She is to follow up in 10 days for suture removal. She was instructed to call our office if she experiences headaches or clear wound drainage.           Berta Cruz PA-C  05/05/19  10:25 AM      "

## 2019-05-06 NOTE — DISCHARGE SUMMARY
[facility]    Date of Admission: 5/2/2019  Date of Discharge:  5/4/2019    Discharge Diagnosis: Lumbar stenosis and instability     Procedures Performed  Procedure(s):  LUMBAR FUSION DECOMPRESSON WITH PEDICLE SCREWS L4-5, LAMINECTOMY, FORAMINOTOMY RIGHT L3-4       Presenting Problem  Lumbar stenosis with neurogenic claudication [M48.062]  Lumbar stenosis with neurogenic claudication [M48.062]     History of Present Illness  Patient is a 68 y.o. retired , who presented to our service with right flank pain that extended into her lateral right calf.  Preoperative studies demonstrated recess narrowing at L3-4 and at the foraminal level at L4-5 that raised the katt for an L4 radiculopathy.  As such, pt. Presented for lumbar decompression, and fusion at L4-5 to open the disc space on 5/02/19.      Intraoperatively we encountered a small dural rent which was closed primarily with 6.0 prolene suture and covered with duragen.    Hospital Course  Drain Output: POD#1: 200 POD#2: 300 POD#3: 140 and discontinued     POD#1: pt. Was flat until mid-day.   POD#2: H&H returned as 11.2 and 32.4  POD#3: drain discontinued     Over the course of her hospital stay, vitals remained stable and her post-op dressing was clean, dry and intact.  Motor and sensory function were found to be intact throughout.  She was ambulatory, voiding independently, and tolerated PO without associated nausea or vomiting. Pain was minimal and well controlled with oral medications at the time of discharge on 05/04/2019.     Condition on Discharge:  Stable   Discharge to: Home     PATIENT SPECIFIC EDUCATION/PLAN:  1. Follow-up with Neurosurgery in 10-12 days for suture removal   2. No driving until follow-up.  3. No lifting greater than 5 lbs   4. The patient may get incision wet in the shower beginning on 5/7/19.   5. NO tub bathing or swimming until follow-up  6. Ice pack to  incision(s) as needed for associated pain or swelling   7. Contact Dr. Borja's office for clear wound drainage or severe headache     Discharge Medications     Discharge Medications      New Medications      Instructions Start Date   ibuprofen 600 MG tablet  Commonly known as:  ADVIL,MOTRIN   600 mg, Oral, 3 Times Daily      oxyCODONE-acetaminophen 5-325 MG per tablet  Commonly known as:  PERCOCET   1 tablet, Oral, 3 Times Daily PRN      OXYCONTIN 15 MG tablet extended-release 12 hour  Generic drug:  oxyCODONE ER   15 mg, Oral, Every 12 Hours Scheduled         Continue These Medications      Instructions Start Date   amLODIPine 10 MG tablet  Commonly known as:  NORVASC   10 mg, Daily      aspirin 81 MG EC tablet   81 mg, Oral, Daily      baclofen 10 MG tablet  Commonly known as:  LIORESAL   10 mg, 3 Times Daily PRN      benazepril 40 MG tablet  Commonly known as:  LOTENSIN   40 mg, Oral, Daily      hydrochlorothiazide 25 MG tablet  Commonly known as:  HYDRODIURIL   25 mg, Oral, Daily      latanoprost 0.005 % ophthalmic solution  Commonly known as:  XALATAN   Both Eyes, Nightly      metoprolol succinate  MG 24 hr tablet  Commonly known as:  TOPROL-XL   100 mg, Daily      MULTIVITAL PO   Oral, Daily      omeprazole 20 MG capsule  Commonly known as:  priLOSEC   20 mg, Daily      pravastatin 20 MG tablet  Commonly known as:  PRAVACHOL   20 mg, Daily      vitamin E 400 UNIT capsule   400 Units, Oral, Daily         Stop These Medications    HYDROcodone-acetaminophen 7.5-325 MG per tablet  Commonly known as:  NORCO            Follow-up Appointments  No future appointments.  Additional Instructions for the Follow-ups that You Need to Schedule     Discharge Follow-up with Specified Provider: Jonh; 2 Weeks   As directed      To:  Jonh    Follow Up:  2 Weeks    Follow Up Details:  Follow-up with physician's assistant in 10-12 days for suture removal         Notify the physician or go the ED for the following  conditions:   As directed      Contact Dr. Borja's office or answering service for clear wound drainage or severe headache    Order Comments:  Contact Dr. Borja's office or answering service for clear wound drainage or severe headache                Referring Provider  MD PAULINA Hector Erin Yvonne, MD Sarah C Overmyer, PA-C  05/06/19  6:48 AM

## 2019-05-11 ENCOUNTER — NURSE TRIAGE (OUTPATIENT)
Dept: CALL CENTER | Facility: HOSPITAL | Age: 69
End: 2019-05-11

## 2019-05-11 NOTE — TELEPHONE ENCOUNTER
"Caller has removed part of a fecal impaction. Is having rectal pain, cannot sit down. Has tried glycerin suppository and correctol. She has spinal surgery on 5/2/19 and last BM was that day.     Reason for Disposition  • [1] Rectal pain or fullness from fecal impaction (rectum full of stool) AND [2] NOT better after SITZ bath, suppository or enema    Additional Information  • Negative: [1] Abdomen pain is main symptom AND [2] adult male  • Negative: [1] Abdomen pain is main symptom AND [2] adult female  • Negative: Rectal bleeding or blood in stool is main symptom  • Negative: Rectal pain or itching is main symptom  • Negative: Constipation in a cancer patient who is currently (or recently) receiving chemotherapy or radiation therapy, or cancer patient who has metastatic or end-stage cancer and is receiving palliative care  • Negative: Patient sounds very sick or weak to the triager  • Negative: [1] Vomiting AND [2] abdomen looks much more swollen than usual  • Negative: [1] Vomiting AND [2] contains bile (green color)  • Negative: [1] Constant abdominal pain AND [2] present > 2 hours    Answer Assessment - Initial Assessment Questions  1. STOOL PATTERN OR FREQUENCY: \"How often do you pass bowel movements (BMs)?\"  (Normal range: tid to q 3 days)  \"When was the last BM passed?\"        Usually have one daily  2. STRAINING: \"Do you have to strain to have a BM?\"       Not usually  3. RECTAL PAIN: \"Does your rectum hurt when the stool comes out?\" If so, ask: \"Do you have hemorrhoids? How bad is the pain?\"  (Scale 1-10; or mild, moderate, severe)      Painful can feel stool at anus, removed some hard balls manually  4. STOOL COMPOSITION: \"Are the stools hard?\"       Hard  5. BLOOD ON STOOLS: \"Has there been any blood on the toilet tissue or on the surface of the BM?\" If so, ask: \"When was the last time?\"       No   6. CHRONIC CONSTIPATION: \"Is this a new problem for you?\"  If no, ask: How long have you had this problem?\" " "(days, weeks, months)       New since surgery, date of surgery 5/2/19. Last good BM was that day.   7. CHANGES IN DIET: \"Have there been any recent changes in your diet?\"       No  8. MEDICATIONS: \"Have you been taking any new medications?\"      Oxycodone for pain  9. LAXATIVES: \"Have you been using any laxatives or enemas?\"  If yes, ask \"What, how often, and when was the last time?\"      Correctol and glycerin suppository  10. CAUSE: \"What do you think is causing the constipation?\"         Back surgery, pain medication, decreased mobility  11. OTHER SYMPTOMS: \"Do you have any other symptoms?\" (e.g., abdominal pain, fever, vomiting)        Abdominal pain down low, hurts to sit down. Denies vomiting.   12. PREGNANCY: \"Is there any chance you are pregnant?\" \"When was your last menstrual period?\"        NA    Protocols used: CONSTIPATION-ADULT-AH      "

## 2019-05-14 ENCOUNTER — OFFICE VISIT (OUTPATIENT)
Dept: NEUROSURGERY | Facility: CLINIC | Age: 69
End: 2019-05-14

## 2019-05-14 VITALS
WEIGHT: 166 LBS | TEMPERATURE: 99 F | BODY MASS INDEX: 28.34 KG/M2 | HEIGHT: 64 IN | DIASTOLIC BLOOD PRESSURE: 62 MMHG | SYSTOLIC BLOOD PRESSURE: 118 MMHG

## 2019-05-14 DIAGNOSIS — M53.9 MULTILEVEL DEGENERATIVE DISC DISEASE: Primary | ICD-10-CM

## 2019-05-14 DIAGNOSIS — M25.511 PAIN IN JOINT OF RIGHT SHOULDER: ICD-10-CM

## 2019-05-14 DIAGNOSIS — M54.16 LUMBAR RADICULOPATHY: ICD-10-CM

## 2019-05-14 PROCEDURE — 99024 POSTOP FOLLOW-UP VISIT: CPT | Performed by: PHYSICIAN ASSISTANT

## 2019-05-14 NOTE — PROGRESS NOTES
"Patient: Eneida Bartholomew  : 1950  Chart #: [id number]    Date of Service: 2019    CHIEF COMPLAINT: Lumbar stenosis and instability    History of Present Illness Ms. Bartholomew is a pleasant 68-year-old woman with chronic ongoing numbness and pain involving her back and right leg.  Studies raised the specter of an L4 radiculopathy that may emanate from the recess at L3-4 or the foraminal level at L4-5 where there is significant disc space collapse.  As such on 2019 she underwent foraminotomy on the right at L3-4 and PLIF at L4-5.  Surgery was complicated by dural rent on the right L4-5 closed primarily and covered with DuraGen.    Today patient is 2 weeks postop and presents for suture removal.  She is doing very well.  She denies any pain or sensory alteration in the lower extremities.  Her low back is sore but no overt pain.  She is not currently taking any pain medications.  Her only complaint is constipation.  She denies headache or incisional issues.      Review of Systems   Genitourinary: Positive for frequency.   All other systems reviewed and are negative.      Objective   Vital Signs: Blood pressure 118/62, temperature 99 °F (37.2 °C), height 162.6 cm (64\"), weight 75.3 kg (166 lb).  Physical Exam   Constitutional: She appears well-developed and well-nourished.   Skin:   Nylon sutures were removed in a clean fashion and incision remained intact.  No evidence of incisional dehiscence or wound drainage.    Patient is ambulating with assistance of a rolling walker.   Nursing note and vitals reviewed.      Independent review of radiographic imaging: No new studies    Assessment/Plan    Diagnosis:   1.  2 weeks status post right L3-4 foraminotomy and PLIF at L4-5.  2.  Intraoperative dural rent      Medical Decision Making: Ms. Morales is doing very well.  She complains of very little pain.  She has no symptoms into her legs.  Sutures were removed today and further wound care instructions were " discussed.  I encouraged her to continue walking and working on upright posture.  She will follow-up in our office in about 4 to 6 weeks with some plain films of the lumbar spine.  She may call in the interim if she has any questions or concerns.         Eneida was seen today for post-op.    Diagnoses and all orders for this visit:    Multilevel degenerative disc disease  -     XR Spine Lumbar AP & Lateral; Future    Lumbar radiculopathy  -     XR Spine Lumbar AP & Lateral; Future    Pain in joint of right shoulder  -     XR Spine Lumbar AP & Lateral; Future               Ave Chanel PA-C  Patient Care Team:  Cari Rodriguez MD as PCP - General (Internal Medicine)  Cari Rodriguez MD as Referring Physician (Internal Medicine)

## 2019-06-03 ENCOUNTER — HOSPITAL ENCOUNTER (OUTPATIENT)
Dept: GENERAL RADIOLOGY | Facility: HOSPITAL | Age: 69
Discharge: HOME | End: 2019-06-03
Admitting: PHYSICIAN ASSISTANT

## 2019-06-03 DIAGNOSIS — M25.511 PAIN IN JOINT OF RIGHT SHOULDER: ICD-10-CM

## 2019-06-03 DIAGNOSIS — M54.16 LUMBAR RADICULOPATHY: ICD-10-CM

## 2019-06-03 DIAGNOSIS — M53.9 MULTILEVEL DEGENERATIVE DISC DISEASE: ICD-10-CM

## 2019-06-03 PROCEDURE — 72100 X-RAY EXAM L-S SPINE 2/3 VWS: CPT

## 2019-06-11 ENCOUNTER — OFFICE VISIT (OUTPATIENT)
Dept: NEUROSURGERY | Facility: CLINIC | Age: 69
End: 2019-06-11

## 2019-06-11 VITALS
WEIGHT: 164 LBS | BODY MASS INDEX: 28 KG/M2 | SYSTOLIC BLOOD PRESSURE: 122 MMHG | HEIGHT: 64 IN | DIASTOLIC BLOOD PRESSURE: 64 MMHG | TEMPERATURE: 97.9 F

## 2019-06-11 DIAGNOSIS — M25.511 PAIN IN JOINT OF RIGHT SHOULDER: ICD-10-CM

## 2019-06-11 DIAGNOSIS — M53.9 MULTILEVEL DEGENERATIVE DISC DISEASE: Primary | ICD-10-CM

## 2019-06-11 DIAGNOSIS — M54.16 LUMBAR RADICULOPATHY: ICD-10-CM

## 2019-06-11 PROCEDURE — 99024 POSTOP FOLLOW-UP VISIT: CPT | Performed by: PHYSICIAN ASSISTANT

## 2019-06-11 NOTE — PROGRESS NOTES
"Patient: Eneida Bartholomew  : 1950  Chart #: [id number]    Date of Service: 2019    CHIEF COMPLAINT: Lumbar stenosis and instability    History of Present Illness Ms. Bartholomew is seen in follow-up.  She presented with chronic ongoing numbness and pain involving her back and right leg.  On 2019 she underwent foraminotomy on the right at L3-4 and PLIF at L4-5.  A dural rent on the right at L4-5 was encountered intraoperatively and closed primarily and covered with DuraGen.    Today patient is 1 month postop and doing very well.  She has no complaints of pain today.  She has been walking daily.  She feels like her endurance is down.  She has no pain or sensory alteration in her lower extremities.  She is no longer taking pain medications.      Review of Systems   Constitutional: Positive for unexpected weight change.   All other systems reviewed and are negative.      Objective   Vital Signs: Blood pressure 122/64, temperature 97.9 °F (36.6 °C), height 162.6 cm (64\"), weight 74.4 kg (164 lb).  Physical Exam   Constitutional: She appears well-developed and well-nourished. No distress.   HENT:   Head: Normocephalic and atraumatic.   Eyes: EOM are normal. Pupils are equal, round, and reactive to light.   Skin:   Well-healed lumbar incision   Psychiatric: She has a normal mood and affect. Her behavior is normal. Thought content normal.   Nursing note and vitals reviewed.  Ambulates with assistance of a cane.  She is a bit flexed forward at the waist and was encouraged to stand up tall.        Independent review of radiographic imaging: Plain films of the lumbar spine demonstrate intact surgical construct at L4-5 without disruption or malalignment.  There is notable disc space collapse at L2-3    Assessment/Plan    Diagnosis:   1.  1 month status post right L3-4 foraminotomy and PLIF at L4-5.  2.  Intraoperative dural rent    Medical Decision Making: Patient is doing very well. No complaints today other " than feeling tired.  She will continue to walk.  Her endurance will improve with time.  I encouraged her to work on her posture.  She will follow-up in 2.5 months to check her progress.  Call in the interim with any questions or concerns.    Eneida was seen today for post-op.    Diagnoses and all orders for this visit:    Multilevel degenerative disc disease    Lumbar radiculopathy    Pain in joint of right shoulder               Ave Chanel PA-C  Patient Care Team:  Cari Rodriguez MD as PCP - General (Internal Medicine)  Cari Rodriguez MD as Referring Physician (Internal Medicine)

## 2019-09-20 ENCOUNTER — OFFICE VISIT (OUTPATIENT)
Dept: NEUROSURGERY | Facility: CLINIC | Age: 69
End: 2019-09-20

## 2019-09-20 VITALS — HEIGHT: 64 IN | BODY MASS INDEX: 29.02 KG/M2 | TEMPERATURE: 98.2 F | WEIGHT: 170 LBS

## 2019-09-20 DIAGNOSIS — Z98.1 HISTORY OF LUMBAR FUSION: ICD-10-CM

## 2019-09-20 DIAGNOSIS — M53.9 MULTILEVEL DEGENERATIVE DISC DISEASE: Primary | ICD-10-CM

## 2019-09-20 DIAGNOSIS — M25.511 PAIN IN JOINT OF RIGHT SHOULDER: ICD-10-CM

## 2019-09-20 DIAGNOSIS — M54.16 LUMBAR RADICULOPATHY: ICD-10-CM

## 2019-09-20 PROCEDURE — 99213 OFFICE O/P EST LOW 20 MIN: CPT | Performed by: NEUROLOGICAL SURGERY

## 2019-09-20 NOTE — PROGRESS NOTES
Patient: Eneida Bartholomew  : 1950    Primary Care Provider: Cari Rodriguez MD    Requesting Provider: As above      History    Chief Complaint: Back and right leg pain.    History of Present Illness: Patient is a 69-year-old woman with a history of the above-noted complaints.  On 2019 she underwent right L3-4 foraminotomy as well as PLIF at L4-5.  She was kept down for a short time given a dural rent on the right at L4-5.  She is ambulating and trying to build up her endurance.  She is not having much in the way of back or leg pain.  For the last month or so she has had severe pain in her right shoulder with limited range of motion.  She denies headache.    Review of Systems   Constitutional: Negative for activity change, appetite change, chills, diaphoresis, fatigue, fever and unexpected weight change.   HENT: Negative for congestion, dental problem, drooling, ear discharge, ear pain, facial swelling, hearing loss, mouth sores, nosebleeds, postnasal drip, rhinorrhea, sinus pressure, sneezing, sore throat, tinnitus, trouble swallowing and voice change.    Eyes: Negative for photophobia, pain, discharge, redness, itching and visual disturbance.   Respiratory: Negative for apnea, cough, choking, chest tightness, shortness of breath, wheezing and stridor.    Cardiovascular: Negative for chest pain, palpitations and leg swelling.   Gastrointestinal: Negative for abdominal distention, abdominal pain, anal bleeding, blood in stool, constipation, diarrhea, nausea, rectal pain and vomiting.   Endocrine: Negative for cold intolerance, heat intolerance, polydipsia, polyphagia and polyuria.   Genitourinary: Negative for decreased urine volume, difficulty urinating, dysuria, enuresis, flank pain, frequency, genital sores, hematuria and urgency.   Musculoskeletal: Positive for neck pain and neck stiffness. Negative for arthralgias, back pain, gait problem, joint swelling and myalgias.   Skin: Negative for  "color change, pallor, rash and wound.   Allergic/Immunologic: Negative for environmental allergies, food allergies and immunocompromised state.   Neurological: Negative for dizziness, tremors, seizures, syncope, facial asymmetry, speech difficulty, weakness, light-headedness, numbness and headaches.   Hematological: Negative for adenopathy. Does not bruise/bleed easily.   Psychiatric/Behavioral: Negative for agitation, behavioral problems, confusion, decreased concentration, dysphoric mood, hallucinations, self-injury, sleep disturbance and suicidal ideas. The patient is not nervous/anxious and is not hyperactive.        The patient's past medical history, past surgical history, family history, and social history have been reviewed at length in the electronic medical record.    Physical Exam:   Temp 98.2 °F (36.8 °C) (Temporal)   Ht 162.6 cm (64\")   Wt 77.1 kg (170 lb)   BMI 29.18 kg/m²   The patient moves about in a mildly stooped fashion but quite independently.  Her incision looks great.  Motor function is preserved in her lower extremities.  She is not tender to palpation in the right shoulder but ranging the right shoulder induces significant pain.    Medical Decision Making      Diagnosis:   1.  Lumbar stenosis and radiculopathy status post decompression and fusion.  2.  Primary right shoulder dysfunction.    Treatment Options:   The patient will follow-up in our clinic in 4 months with new plain films of the lumbar spine.  I am going to refer to orthopedics for evaluation and treatment of her right shoulder.       Diagnosis Plan   1. Multilevel degenerative disc disease     2. Lumbar radiculopathy     3. Pain in joint of right shoulder  Ambulatory Referral to Orthopedic Surgery   4. History of lumbar fusion  XR Spine Lumbar 2 or 3 View       Scribed for Yoni Borja MD by Fernanda Miller CMA on 09/20/2019 at 10:07 AM      I, Dr. Borja, personally performed the services described in the " documentation, as scribed in my presence, and it is both accurate and complete.

## 2019-11-27 ENCOUNTER — HOSPITAL ENCOUNTER (OUTPATIENT)
Dept: GENERAL RADIOLOGY | Facility: HOSPITAL | Age: 69
Discharge: HOME | End: 2019-11-27
Admitting: INTERNAL MEDICINE

## 2019-11-27 ENCOUNTER — TRANSCRIBE ORDERS (OUTPATIENT)
Dept: ADMINISTRATIVE | Facility: HOSPITAL | Age: 69
End: 2019-11-27

## 2019-11-27 DIAGNOSIS — M17.9 OSTEOARTHRITIS OF KNEE, UNSPECIFIED: ICD-10-CM

## 2019-11-27 DIAGNOSIS — M25.569 KNEE PAIN, UNSPECIFIED CHRONICITY, UNSPECIFIED LATERALITY: Primary | ICD-10-CM

## 2019-11-27 PROCEDURE — 73562 X-RAY EXAM OF KNEE 3: CPT

## 2020-01-31 ENCOUNTER — HOSPITAL ENCOUNTER (OUTPATIENT)
Dept: GENERAL RADIOLOGY | Facility: HOSPITAL | Age: 70
Discharge: HOME | End: 2020-01-31
Admitting: NEUROLOGICAL SURGERY

## 2020-01-31 DIAGNOSIS — Z98.1 HISTORY OF LUMBAR FUSION: ICD-10-CM

## 2020-01-31 PROCEDURE — 72100 X-RAY EXAM L-S SPINE 2/3 VWS: CPT

## 2020-02-26 ENCOUNTER — OFFICE VISIT (OUTPATIENT)
Dept: NEUROSURGERY | Facility: CLINIC | Age: 70
End: 2020-02-26

## 2020-02-26 VITALS
RESPIRATION RATE: 16 BRPM | DIASTOLIC BLOOD PRESSURE: 70 MMHG | SYSTOLIC BLOOD PRESSURE: 128 MMHG | HEIGHT: 64 IN | BODY MASS INDEX: 28.68 KG/M2 | HEART RATE: 80 BPM | WEIGHT: 168 LBS | OXYGEN SATURATION: 99 %

## 2020-02-26 DIAGNOSIS — Z98.1 HISTORY OF LUMBAR FUSION: ICD-10-CM

## 2020-02-26 DIAGNOSIS — M25.511 PAIN IN JOINT OF RIGHT SHOULDER: ICD-10-CM

## 2020-02-26 DIAGNOSIS — M54.16 LUMBAR RADICULOPATHY: ICD-10-CM

## 2020-02-26 DIAGNOSIS — M53.9 MULTILEVEL DEGENERATIVE DISC DISEASE: Primary | ICD-10-CM

## 2020-02-26 PROCEDURE — 99213 OFFICE O/P EST LOW 20 MIN: CPT | Performed by: PHYSICIAN ASSISTANT

## 2020-02-26 NOTE — PROGRESS NOTES
Patient: Eneida Bartholomew  : 1950  Chart #: [id number]    Date of Service: 2020    CHIEF COMPLAINT: Back and right leg pain     History of Present Illness Ms. Bartholomew is seen in follow-up.  She is a 69-year-old woman who on 2019 underwent right L3-4 foraminotomies as well as PLIF at L4-5.  She was kept down for short time given a dural rent on the right L4-5.  She is done very well.  Unfortunately, more recently, she has been having issues out of her right knee.  She has pain and swelling, worse with weightbearing.  She recently had an MRI of the knee and is awaiting follow-up.  She has had more stiffness in her low back since she has been more sedentary due to her knee pain.  No radicular complaints.  She had been doing much more walking until her knee started bothering her.    When patient was last seen she complained of right shoulder pain.  This went away with conservative measures.      Past Medical History:   Diagnosis Date   • Arthritis    • Bronchitis    • Coronary artery disease    • Fibromyalgia    • GERD (gastroesophageal reflux disease)    • Glaucoma    • History of migraine    • Hypertension    • UTI (urinary tract infection)          Current Outpatient Medications:   •  amLODIPine (NORVASC) 10 MG tablet, 10 mg Daily., Disp: , Rfl:   •  aspirin 81 MG EC tablet, Take 81 mg by mouth Daily., Disp: , Rfl:   •  baclofen (LIORESAL) 10 MG tablet, 10 mg 3 (Three) Times a Day As Needed., Disp: , Rfl:   •  benazepril (LOTENSIN) 40 MG tablet, Take 40 mg by mouth Daily., Disp: , Rfl:   •  hydrochlorothiazide (HYDRODIURIL) 25 MG tablet, Take 25 mg by mouth Daily., Disp: , Rfl:   •  ibuprofen (ADVIL,MOTRIN) 600 MG tablet, Take 1 tablet by mouth 3 (Three) Times a Day., Disp: 40 tablet, Rfl: 0  •  latanoprost (XALATAN) 0.005 % ophthalmic solution, Administer  to both eyes Every Night., Disp: , Rfl:   •  metoprolol succinate XL (TOPROL-XL) 100 MG 24 hr tablet, 100 mg Daily., Disp: , Rfl:   •   Multiple Vitamins-Minerals (MULTIVITAL PO), Take  by mouth Daily., Disp: , Rfl:   •  omeprazole (priLOSEC) 20 MG capsule, 20 mg Daily., Disp: , Rfl:   •  oxyCODONE ER (oxyCONTIN) 15 MG tablet extended-release 12 hour, Take 1 tablet by mouth Every 12 (Twelve) Hours., Disp: 10 tablet, Rfl: 0  •  oxyCODONE-acetaminophen (PERCOCET) 5-325 MG per tablet, Take 1 tablet by mouth 3 (Three) Times a Day As Needed for Moderate Pain  (As needed for breakthrough pain)., Disp: 50 tablet, Rfl: 0  •  pravastatin (PRAVACHOL) 20 MG tablet, 20 mg Daily., Disp: , Rfl:   •  vitamin E 400 UNIT capsule, Take 400 Units by mouth Daily., Disp: , Rfl:     Past Surgical History:   Procedure Laterality Date   • BREAST BIOPSY Right    • BUNIONECTOMY Bilateral     x4   • CATARACT EXTRACTION EXTRACAPSULAR W/ INTRAOCULAR LENS IMPLANTATION Bilateral    • CHOLECYSTECTOMY     • CORONARY ANGIOPLASTY WITH STENT PLACEMENT     • LUMBAR LAMINECTOMY WITH FUSION N/A 5/2/2019    Procedure: LUMBAR FUSION DECOMPRESSON WITH PEDICLE SCREWS L4-5, LAMINECTOMY, FORAMINOTOMY RIGHT L3-4;  Surgeon: Yoni Borja MD;  Location: [facility];  Service: Neurosurgery       Social History     Socioeconomic History   • Marital status:      Spouse name: Claude   • Number of children: Not on file   • Years of education: Not on file   • Highest education level: Not on file   Tobacco Use   • Smoking status: Never Smoker   • Smokeless tobacco: Never Used   Substance and Sexual Activity   • Alcohol use: No     Frequency: Never   • Drug use: No   • Sexual activity: Defer   Social History Narrative    SisterCira will be driving.    Pt lives in [place] with spouse         Review of Systems   Constitutional: Negative.    HENT: Negative.    Eyes: Negative.    Respiratory: Negative.    Cardiovascular: Negative.    Gastrointestinal: Negative.    Endocrine: Negative.    Genitourinary: Negative.    Musculoskeletal: Positive for arthralgias and back pain.   Skin: Negative.   "  Allergic/Immunologic: Negative.    Neurological: Negative.    Hematological: Negative.    Psychiatric/Behavioral: Negative.        Objective   Vital Signs: Blood pressure 128/70, pulse 80, resp. rate 16, height 162.6 cm (64\"), weight 76.2 kg (168 lb), SpO2 99 %, not currently breastfeeding.  Physical Exam   Constitutional: She appears well-developed and well-nourished. No distress.   HENT:   Head: Normocephalic and atraumatic.   Eyes: EOM are normal. Pupils are equal, round, and reactive to light.   Skin:   Well-healed lumbar incision   Musculoskeletal:  Strength is intact in upper and lower extremities to direct testing.  Station and gait are normal.  Neurologic:  Muscle tone is normal throughout.  Coordination is intact.  Sensation is intact to light touch throughout.  Patient is oriented to person, place, and time.    Independent review of radiographic imaging: Plain films of the lumbar spine demonstrate intact surgical she will call construct L4-5.  There is significant degenerative changes noted above her construct at L3 4 and L2-3    Assessment/Plan   Diagnosis: Lumbar stenosis and radiculopathy status post decompression and fusion    Medical Decision Making: Ms. Bartholomew is doing very well with regard to her back.  She had been increasing active until her knee started bothering her.  She has an MRI of the knee and is awaiting follow up for review.  Follow up in our office in 4-5 months to check progress.  Sooner if clinically indicated.         Eneida was seen today for multilevel degenerative disc disease.    Diagnoses and all orders for this visit:    Multilevel degenerative disc disease    Lumbar radiculopathy    Pain in joint of right shoulder    History of lumbar fusion               Ave Chanel PA-C  Patient Care Team:  Cari Rodriguez MD as PCP - General (Internal Medicine)  Cari Rodriguez MD as Referring Physician (Internal Medicine)              "

## 2020-07-29 ENCOUNTER — TELEPHONE (OUTPATIENT)
Dept: NEUROSURGERY | Facility: CLINIC | Age: 70
End: 2020-07-29

## 2020-09-23 ENCOUNTER — OFFICE VISIT (OUTPATIENT)
Dept: NEUROSURGERY | Facility: CLINIC | Age: 70
End: 2020-09-23

## 2020-09-23 VITALS
SYSTOLIC BLOOD PRESSURE: 126 MMHG | WEIGHT: 177 LBS | BODY MASS INDEX: 31.36 KG/M2 | HEIGHT: 63 IN | TEMPERATURE: 98.7 F | DIASTOLIC BLOOD PRESSURE: 72 MMHG

## 2020-09-23 DIAGNOSIS — Z98.1 HISTORY OF LUMBAR FUSION: ICD-10-CM

## 2020-09-23 DIAGNOSIS — M54.16 LUMBAR RADICULOPATHY: ICD-10-CM

## 2020-09-23 DIAGNOSIS — M25.511 PAIN IN JOINT OF RIGHT SHOULDER: ICD-10-CM

## 2020-09-23 DIAGNOSIS — M53.9 MULTILEVEL DEGENERATIVE DISC DISEASE: Primary | ICD-10-CM

## 2020-09-23 PROCEDURE — 99213 OFFICE O/P EST LOW 20 MIN: CPT | Performed by: PHYSICIAN ASSISTANT

## 2020-09-23 RX ORDER — ROSUVASTATIN CALCIUM 40 MG/1
40 TABLET, COATED ORAL NIGHTLY
COMMUNITY
Start: 2020-07-23

## 2020-09-23 NOTE — PROGRESS NOTES
Patient: Eneida Bartholomew  : 1950  Chart #: [id number]    Date of Service: 2020    CHIEF COMPLAINT: Back and right leg pain    History of Present Illness Ms. Bartholomew is a 69-year-old woman who is known to our clinic for undergoing right L3-4 foraminotomies as well as PLIFat L4-5 on 2019.  A dural rent was encountered and patient was kept at bedrest postoperatively.  She is done very well in terms of her back.  Unfortunately she has had more issues out of her right knee which has made walking difficult.  Total knee replacement has been recommended and she is working to get this scheduled for November.  She is a bit tearful today as she lost her  2 weeks ago.  She started having low back spasms and cramping in her calves.  Ultimately she found out she was dehydrated and now is on magnesium supplements and trying to stay hydrated which is helped.  She has no complaints today.      Past Medical History:   Diagnosis Date   • Arthritis    • Bronchitis    • Coronary artery disease    • Fibromyalgia    • GERD (gastroesophageal reflux disease)    • Glaucoma    • History of migraine    • Hypertension    • UTI (urinary tract infection)          Current Outpatient Medications:   •  amLODIPine (NORVASC) 10 MG tablet, 10 mg Daily., Disp: , Rfl:   •  aspirin 81 MG EC tablet, Take 81 mg by mouth Daily., Disp: , Rfl:   •  baclofen (LIORESAL) 10 MG tablet, 10 mg 3 (Three) Times a Day As Needed., Disp: , Rfl:   •  benazepril (LOTENSIN) 40 MG tablet, Take 40 mg by mouth Daily., Disp: , Rfl:   •  hydrochlorothiazide (HYDRODIURIL) 25 MG tablet, Take 25 mg by mouth Daily., Disp: , Rfl:   •  ibuprofen (ADVIL,MOTRIN) 600 MG tablet, Take 1 tablet by mouth 3 (Three) Times a Day., Disp: 40 tablet, Rfl: 0  •  latanoprost (XALATAN) 0.005 % ophthalmic solution, Administer  to both eyes Every Night., Disp: , Rfl:   •  metoprolol succinate XL (TOPROL-XL) 100 MG 24 hr tablet, 100 mg Daily., Disp: , Rfl:   •  Multiple  Vitamins-Minerals (MULTIVITAL PO), Take  by mouth Daily., Disp: , Rfl:   •  omeprazole (priLOSEC) 20 MG capsule, 20 mg Daily., Disp: , Rfl:   •  oxyCODONE-acetaminophen (PERCOCET) 5-325 MG per tablet, Take 1 tablet by mouth 3 (Three) Times a Day As Needed for Moderate Pain  (As needed for breakthrough pain)., Disp: 50 tablet, Rfl: 0  •  rosuvastatin (CRESTOR) 40 MG tablet, , Disp: , Rfl:   •  vitamin E 400 UNIT capsule, Take 400 Units by mouth Daily., Disp: , Rfl:     Past Surgical History:   Procedure Laterality Date   • BREAST BIOPSY Right    • BUNIONECTOMY Bilateral     x4   • CATARACT EXTRACTION EXTRACAPSULAR W/ INTRAOCULAR LENS IMPLANTATION Bilateral    • CHOLECYSTECTOMY     • CORONARY ANGIOPLASTY WITH STENT PLACEMENT     • LUMBAR LAMINECTOMY WITH FUSION N/A 5/2/2019    Procedure: LUMBAR FUSION DECOMPRESSON WITH PEDICLE SCREWS L4-5, LAMINECTOMY, FORAMINOTOMY RIGHT L3-4;  Surgeon: Yoni Borja MD;  Location: [facility];  Service: Neurosurgery       Social History     Socioeconomic History   • Marital status:      Spouse name: Claude   • Number of children: Not on file   • Years of education: Not on file   • Highest education level: Not on file   Tobacco Use   • Smoking status: Never Smoker   • Smokeless tobacco: Never Used   Substance and Sexual Activity   • Alcohol use: No     Frequency: Never   • Drug use: No   • Sexual activity: Defer   Social History Narrative    Sister, Cira will be driving.    Pt lives in [place] with spouse         Review of Systems   Constitutional: Positive for fatigue. Negative for activity change, appetite change, chills, diaphoresis, fever and unexpected weight change.   HENT: Positive for sinus pressure. Negative for congestion, dental problem, drooling, ear discharge, ear pain, facial swelling, hearing loss, mouth sores, nosebleeds, postnasal drip, rhinorrhea, sneezing, sore throat, tinnitus, trouble swallowing and voice change.    Eyes: Positive for photophobia.  "Negative for pain, discharge, redness, itching and visual disturbance.   Respiratory: Negative for apnea, cough, choking, chest tightness, shortness of breath, wheezing and stridor.    Cardiovascular: Positive for leg swelling. Negative for chest pain and palpitations.   Gastrointestinal: Negative for abdominal distention, abdominal pain, anal bleeding, blood in stool, constipation, diarrhea, nausea, rectal pain and vomiting.   Endocrine: Negative for cold intolerance, heat intolerance, polydipsia, polyphagia and polyuria.   Genitourinary: Negative for decreased urine volume, difficulty urinating, dysuria, enuresis, flank pain, frequency, genital sores, hematuria and urgency.   Musculoskeletal: Negative for arthralgias, back pain, gait problem, joint swelling, myalgias, neck pain and neck stiffness.   Skin: Negative for color change, pallor, rash and wound.   Allergic/Immunologic: Negative for environmental allergies, food allergies and immunocompromised state.   Neurological: Negative for dizziness, tremors, seizures, syncope, facial asymmetry, speech difficulty, weakness, light-headedness, numbness and headaches.   Hematological: Negative for adenopathy. Does not bruise/bleed easily.   Psychiatric/Behavioral: Negative for agitation, behavioral problems, confusion, decreased concentration, dysphoric mood, hallucinations, self-injury, sleep disturbance and suicidal ideas. The patient is not nervous/anxious and is not hyperactive.    All other systems reviewed and are negative.      Objective   Vital Signs: Blood pressure 126/72, temperature 98.7 °F (37.1 °C), temperature source Infrared, height 160 cm (63\"), weight 80.3 kg (177 lb), not currently breastfeeding.  Physical Exam  Vitals signs and nursing note reviewed.   Constitutional:       General: She is not in acute distress.     Appearance: She is well-developed.   HENT:      Head: Normocephalic and atraumatic.   Eyes:      Pupils: Pupils are equal, round, and " reactive to light.   Psychiatric:         Behavior: Behavior normal.         Thought Content: Thought content normal.     Musculoskeletal:  Strength is intact in upper and lower extremities to direct testing.  Station and gait are normal..  Neurologic:  Muscle tone is normal throughout.  Coordination is intact.  Sensation is intact to light touch throughout.  Patient is oriented to person, place, and time.    Independent review of radiographic imaging: No new studies    Assessment/Plan   Diagnosis:  Lumbar stenosis and radiculopathy status post decompression and fusion    Medical Decision Making: Ms. Bartholomew is doing well in terms of her back.  She is scheduled to undergo total knee replacement surgery in November.  She will follow-up in our clinic as needed.    Eneida was seen today for follow-up and back pain.    Diagnoses and all orders for this visit:    Multilevel degenerative disc disease    Lumbar radiculopathy    Pain in joint of right shoulder    History of lumbar fusion                     Ave Chanel PA-C  Patient Care Team:  Cari Rodriguez MD as PCP - General (Internal Medicine)  Cari Rodriguez MD as Referring Physician (Internal Medicine)

## 2021-07-09 ENCOUNTER — OFFICE VISIT (OUTPATIENT)
Dept: NEUROSURGERY | Facility: CLINIC | Age: 71
End: 2021-07-09

## 2021-07-09 VITALS
HEIGHT: 63 IN | WEIGHT: 181.2 LBS | HEART RATE: 83 BPM | BODY MASS INDEX: 32.11 KG/M2 | TEMPERATURE: 97.8 F | OXYGEN SATURATION: 98 %

## 2021-07-09 DIAGNOSIS — M53.9 MULTILEVEL DEGENERATIVE DISC DISEASE: ICD-10-CM

## 2021-07-09 DIAGNOSIS — Z98.1 HISTORY OF LUMBAR FUSION: ICD-10-CM

## 2021-07-09 DIAGNOSIS — M25.511 PAIN IN JOINT OF RIGHT SHOULDER: ICD-10-CM

## 2021-07-09 DIAGNOSIS — M54.16 LUMBAR RADICULOPATHY: ICD-10-CM

## 2021-07-09 DIAGNOSIS — M48.062 LUMBAR STENOSIS WITH NEUROGENIC CLAUDICATION: Primary | ICD-10-CM

## 2021-07-09 PROCEDURE — 99214 OFFICE O/P EST MOD 30 MIN: CPT | Performed by: PHYSICIAN ASSISTANT

## 2021-07-09 RX ORDER — IBUPROFEN 800 MG/1
800 TABLET ORAL AS NEEDED
COMMUNITY
End: 2023-01-13

## 2021-07-09 RX ORDER — OXYBUTYNIN CHLORIDE 10 MG/1
10 TABLET, EXTENDED RELEASE ORAL DAILY
COMMUNITY
Start: 2021-05-27

## 2021-07-09 NOTE — PROGRESS NOTES
Patient: Eneida Bartholomew  : 1950  Chart #: [id number]    Date of Service: 2021    CHIEF COMPLAINT: Low back and left leg pain with walking and standing intolerance    History of Present Illness Ms. Morales is a very pleasant 70-year-old woman who is well-known to Dr. Borja service.  On 2019 she underwent right L3-4 foraminotomies as well as PLIF at L4-5.  A dural rent was encountered intraoperatively.  Overall she has done well.  She underwent right knee replacement last fall.  Her recovery from that has been difficult.  In addition she has had recurrent low back pain that radiates down the lateral aspect of the left leg.  Symptoms are pronounced when standing and walking.  In recent months she has been unable to effectively complete her physical therapy due to standing intolerance.  She has resigned to cooking while sitting on a stool.  She went to the emergency room and was treated with a Toradol injection.  Symptoms improved for a few days and then recurred.  She denies significant right sided symptoms.  No bowel or bladder difficulties.      Past Medical History:   Diagnosis Date   • Arthritis    • Bronchitis    • Coronary artery disease    • Fibromyalgia    • GERD (gastroesophageal reflux disease)    • Glaucoma    • History of migraine    • Hypertension    • UTI (urinary tract infection)          Current Outpatient Medications:   •  amLODIPine (NORVASC) 10 MG tablet, 10 mg Daily., Disp: , Rfl:   •  aspirin 81 MG EC tablet, Take 81 mg by mouth Daily., Disp: , Rfl:   •  baclofen (LIORESAL) 10 MG tablet, 10 mg 3 (Three) Times a Day As Needed., Disp: , Rfl:   •  benazepril (LOTENSIN) 40 MG tablet, Take 40 mg by mouth Daily., Disp: , Rfl:   •  ibuprofen (ADVIL,MOTRIN) 800 MG tablet, Take 800 mg by mouth As Needed for Mild Pain ., Disp: , Rfl:   •  latanoprost (XALATAN) 0.005 % ophthalmic solution, Administer  to both eyes Every Night., Disp: , Rfl:   •  metoprolol succinate XL (TOPROL-XL) 100 MG  24 hr tablet, 100 mg Daily., Disp: , Rfl:   •  Multiple Vitamins-Minerals (MULTIVITAL PO), Take  by mouth Daily., Disp: , Rfl:   •  omeprazole (priLOSEC) 20 MG capsule, 20 mg Daily., Disp: , Rfl:   •  oxybutynin XL (DITROPAN-XL) 10 MG 24 hr tablet, Take 10 mg by mouth Daily., Disp: , Rfl:   •  oxyCODONE-acetaminophen (PERCOCET) 5-325 MG per tablet, Take 1 tablet by mouth 3 (Three) Times a Day As Needed for Moderate Pain  (As needed for breakthrough pain)., Disp: 50 tablet, Rfl: 0  •  vitamin E 400 UNIT capsule, Take 400 Units by mouth Daily., Disp: , Rfl:   •  rosuvastatin (CRESTOR) 40 MG tablet, , Disp: , Rfl:     Past Surgical History:   Procedure Laterality Date   • BREAST BIOPSY Right    • BUNIONECTOMY Bilateral     x4   • CATARACT EXTRACTION EXTRACAPSULAR W/ INTRAOCULAR LENS IMPLANTATION Bilateral    • CHOLECYSTECTOMY     • CORONARY ANGIOPLASTY WITH STENT PLACEMENT     • KNEE ARTHROSCOPY Right 2021   • LUMBAR LAMINECTOMY WITH FUSION N/A 5/2/2019    Procedure: LUMBAR FUSION DECOMPRESSON WITH PEDICLE SCREWS L4-5, LAMINECTOMY, FORAMINOTOMY RIGHT L3-4;  Surgeon: Yoni Borja MD;  Location: [facility];  Service: Neurosurgery       Social History     Socioeconomic History   • Marital status:      Spouse name: Claude   • Number of children: Not on file   • Years of education: Not on file   • Highest education level: Not on file   Tobacco Use   • Smoking status: Never Smoker   • Smokeless tobacco: Never Used   Vaping Use   • Vaping Use: Never used   Substance and Sexual Activity   • Alcohol use: No   • Drug use: No   • Sexual activity: Defer         Review of Systems   Constitutional: Negative for activity change, appetite change, chills, diaphoresis, fatigue, fever and unexpected weight change.   HENT: Negative for congestion, dental problem, drooling, ear discharge, ear pain, facial swelling, hearing loss, mouth sores, nosebleeds, postnasal drip, rhinorrhea, sinus pressure, sinus pain, sneezing, sore  "throat, tinnitus, trouble swallowing and voice change.    Eyes: Negative for photophobia, pain, discharge, redness, itching and visual disturbance.   Respiratory: Negative for apnea, cough, choking, chest tightness, shortness of breath, wheezing and stridor.    Cardiovascular: Negative for chest pain, palpitations and leg swelling.   Gastrointestinal: Negative for abdominal distention, abdominal pain, anal bleeding, blood in stool, constipation, diarrhea, nausea, rectal pain and vomiting.   Endocrine: Negative for cold intolerance, heat intolerance, polydipsia, polyphagia and polyuria.   Genitourinary: Negative for decreased urine volume, difficulty urinating, dysuria, enuresis, flank pain, frequency, genital sores, hematuria and urgency.   Musculoskeletal: Positive for back pain. Negative for arthralgias, gait problem, joint swelling, myalgias, neck pain and neck stiffness.   Skin: Negative for color change, pallor, rash and wound.   Allergic/Immunologic: Negative for environmental allergies, food allergies and immunocompromised state.   Neurological: Negative for dizziness, tremors, seizures, syncope, facial asymmetry, speech difficulty, weakness, light-headedness, numbness and headaches.   Hematological: Negative for adenopathy. Does not bruise/bleed easily.   Psychiatric/Behavioral: Negative for agitation, behavioral problems, confusion, decreased concentration, dysphoric mood, hallucinations, self-injury, sleep disturbance and suicidal ideas. The patient is not nervous/anxious and is not hyperactive.    All other systems reviewed and are negative.      Objective   Vital Signs: Pulse 83, temperature 97.8 °F (36.6 °C), height 160 cm (62.99\"), weight 82.2 kg (181 lb 3.2 oz), SpO2 98 %, not currently breastfeeding.  Physical Exam  Vitals and nursing note reviewed.   Constitutional:       General: She is not in acute distress.     Appearance: She is well-developed.   HENT:      Head: Normocephalic and atraumatic. "   Eyes:      Pupils: Pupils are equal, round, and reactive to light.   Cardiovascular:      Heart sounds: Normal heart sounds.   Pulmonary:      Breath sounds: Normal breath sounds.   Psychiatric:         Behavior: Behavior normal.         Thought Content: Thought content normal.     Musculoskeletal:     Strength is intact in upper and lower extremities to direct testing.     Station and gait are normal.     Straight leg raising is negative.   Neurologic:     Muscle tone is normal throughout.     Coordination is intact.     Sensation is intact to light touch throughout.     Patient is oriented to person, place, and time.         Independent review of radiographic imaging: Plain films of the lumbar spine demonstrate surgical construct intact at L4-5.  No hardware malfunction.  Diminished bone density noted throughout.      Assessment/Plan   Diagnosis: Suspected adjacent level disease at L3-4    Medical Decision Making: Patient's symptoms have become intolerable despite months of physical therapy.  I have referred her for an MRI lumbar spine without gadolinium and plain flexion-extension films of the lumbar spine.  She will follow-up in our office to review and further recommendations will be made at that time.  Historically she has tried epidural injections that did not provide lasting relief        Diagnoses and all orders for this visit:    1. Lumbar stenosis with neurogenic claudication (Primary)    2. Multilevel degenerative disc disease  -     MRI Lumbar Spine With & Without Contrast; Future  -     XR Spine Lumbar Flex & Ext; Future    3. Lumbar radiculopathy  -     MRI Lumbar Spine With & Without Contrast; Future  -     XR Spine Lumbar Flex & Ext; Future    4. Pain in joint of right shoulder  -     MRI Lumbar Spine With & Without Contrast; Future  -     XR Spine Lumbar Flex & Ext; Future    5. History of lumbar fusion  -     MRI Lumbar Spine With & Without Contrast; Future  -     XR Spine Lumbar Flex & Ext;  Future    6. BMI 32.0-32.9,adult                        Patient's Body mass index is 32.11 kg/m². indicating that she is obese (BMI >30). Obesity-related health conditions include the following: none. Obesity is unchanged. BMI is is above average; BMI management plan is completed. We discussed portion control and increasing exercise..         Ave Chanel PA-C  Patient Care Team:  Cari Rodriguez MD as PCP - General (Internal Medicine)  Cari Rodriguez MD as Referring Physician (Internal Medicine)

## 2021-07-19 ENCOUNTER — TELEPHONE (OUTPATIENT)
Dept: NEUROSURGERY | Facility: CLINIC | Age: 71
End: 2021-07-19

## 2021-07-19 DIAGNOSIS — Z98.1 HISTORY OF LUMBAR FUSION: Primary | ICD-10-CM

## 2021-07-20 ENCOUNTER — TELEPHONE (OUTPATIENT)
Dept: NEUROSURGERY | Facility: CLINIC | Age: 71
End: 2021-07-20

## 2021-07-22 ENCOUNTER — TELEPHONE (OUTPATIENT)
Dept: NEUROSURGERY | Facility: CLINIC | Age: 71
End: 2021-07-22

## 2021-07-22 NOTE — TELEPHONE ENCOUNTER
Provider:  Darío  Caller: [facility]  Time of call: 4:00    Phone #:  [phone] ([facility])  Surgery:  LUMBAR FUSION DECOMPRESSON WITH PEDICLE SCREWS L4-5, LAMINECTOMY, FORAMINOTOMY RIGHT L3-4  Surgery Date:  05/02/2019  Last visit:   07/09/2021  Next visit: 08/04/2021      Reason for call:         [facility] called requesting pt's creatine and bun lab order to be faxed to [facility]. Lab order requires ordering provider signature and she is in surgery today. I will have her sign the orders tomorrow and will fax it to the hospital in the morning.     Fax: [phone].    Attempted to call [facility] to let her know but was unable to reach her.

## 2021-08-04 ENCOUNTER — OFFICE VISIT (OUTPATIENT)
Dept: NEUROSURGERY | Facility: CLINIC | Age: 71
End: 2021-08-04

## 2021-08-04 VITALS
TEMPERATURE: 97.1 F | WEIGHT: 179.8 LBS | HEIGHT: 63 IN | SYSTOLIC BLOOD PRESSURE: 134 MMHG | BODY MASS INDEX: 31.86 KG/M2 | DIASTOLIC BLOOD PRESSURE: 72 MMHG

## 2021-08-04 DIAGNOSIS — Z98.1 HISTORY OF LUMBAR FUSION: ICD-10-CM

## 2021-08-04 DIAGNOSIS — M54.16 LUMBAR RADICULOPATHY: ICD-10-CM

## 2021-08-04 DIAGNOSIS — M25.511 PAIN IN JOINT OF RIGHT SHOULDER: ICD-10-CM

## 2021-08-04 DIAGNOSIS — M48.062 SPINAL STENOSIS, LUMBAR REGION, WITH NEUROGENIC CLAUDICATION: ICD-10-CM

## 2021-08-04 DIAGNOSIS — R93.7 ABNORMAL FINDINGS ON DIAGNOSTIC IMAGING OF OTHER PARTS OF MUSCULOSKELETAL SYSTEM: ICD-10-CM

## 2021-08-04 DIAGNOSIS — M53.9 MULTILEVEL DEGENERATIVE DISC DISEASE: Primary | ICD-10-CM

## 2021-08-04 PROCEDURE — 99214 OFFICE O/P EST MOD 30 MIN: CPT | Performed by: PHYSICIAN ASSISTANT

## 2021-08-04 RX ORDER — CYCLOSPORINE 0.5 MG/ML
1 EMULSION OPHTHALMIC EVERY 12 HOURS
COMMUNITY
Start: 2021-07-13

## 2021-08-04 RX ORDER — HYDROCHLOROTHIAZIDE 25 MG/1
25 TABLET ORAL DAILY
COMMUNITY
Start: 2021-07-19

## 2021-08-04 RX ORDER — SODIUM CHLORIDE 9 MG/ML
100 INJECTION, SOLUTION INTRAVENOUS CONTINUOUS
Status: CANCELLED | OUTPATIENT
Start: 2021-08-04

## 2021-08-04 RX ORDER — FAMOTIDINE 10 MG
20 TABLET ORAL
Status: CANCELLED | OUTPATIENT
Start: 2021-08-04

## 2021-08-04 NOTE — PROGRESS NOTES
Patient: Eneida Bartholomew  : 1950  Chart #: [id number]    Date of Service: 2021    CHIEF COMPLAINT: Low back and left leg pain with walking and standing intolerance    History of Present Illness Ms. Morales is a very pleasant 70-year-old woman who is well-known to Dr. Borja service.  On 2019 she underwent right L3-4 foraminotomies as well as PLIF at L4-5.  A dural rent was encountered intraoperatively.  Overall she has done well.  She underwent right knee replacement last fall.  Her recovery from that has been difficult.  In addition, she has had recurrent low back pain that radiates down the lateral aspect of the left leg.  Symptoms are pronounced when standing and walking.  In recent months she has been unable to effectively complete her physical therapy due to standing intolerance.  She has resigned to cooking while sitting on a stool.  She went to the emergency room and was treated with a Toradol injection.  Symptoms improved for a few days and then recurred.  She denies significant right sided symptoms.  No bowel or bladder difficulties.      Past Medical History:   Diagnosis Date   • Arthritis    • Bronchitis    • Coronary artery disease    • Fibromyalgia    • GERD (gastroesophageal reflux disease)    • Glaucoma    • History of migraine    • Hypertension    • UTI (urinary tract infection)          Current Outpatient Medications:   •  amLODIPine (NORVASC) 10 MG tablet, 10 mg Daily., Disp: , Rfl:   •  aspirin 81 MG EC tablet, Take 81 mg by mouth Daily., Disp: , Rfl:   •  baclofen (LIORESAL) 10 MG tablet, 10 mg 3 (Three) Times a Day As Needed., Disp: , Rfl:   •  benazepril (LOTENSIN) 40 MG tablet, Take 40 mg by mouth Daily., Disp: , Rfl:   •  hydroCHLOROthiazide (HYDRODIURIL) 25 MG tablet, , Disp: , Rfl:   •  ibuprofen (ADVIL,MOTRIN) 800 MG tablet, Take 800 mg by mouth As Needed for Mild Pain ., Disp: , Rfl:   •  latanoprost (XALATAN) 0.005 % ophthalmic solution, Administer  to both eyes Every  Night., Disp: , Rfl:   •  metoprolol succinate XL (TOPROL-XL) 100 MG 24 hr tablet, 100 mg Daily., Disp: , Rfl:   •  Multiple Vitamins-Minerals (MULTIVITAL PO), Take  by mouth Daily., Disp: , Rfl:   •  omeprazole (priLOSEC) 20 MG capsule, 20 mg Daily., Disp: , Rfl:   •  oxybutynin XL (DITROPAN-XL) 10 MG 24 hr tablet, Take 10 mg by mouth Daily., Disp: , Rfl:   •  Restasis 0.05 % ophthalmic emulsion, , Disp: , Rfl:   •  rosuvastatin (CRESTOR) 40 MG tablet, , Disp: , Rfl:   •  vitamin E 400 UNIT capsule, Take 400 Units by mouth Daily., Disp: , Rfl:     Past Surgical History:   Procedure Laterality Date   • BREAST BIOPSY Right    • BUNIONECTOMY Bilateral     x4   • CATARACT EXTRACTION EXTRACAPSULAR W/ INTRAOCULAR LENS IMPLANTATION Bilateral    • CHOLECYSTECTOMY     • CORONARY ANGIOPLASTY WITH STENT PLACEMENT     • KNEE ARTHROSCOPY Right 2021   • LUMBAR LAMINECTOMY WITH FUSION N/A 5/2/2019    Procedure: LUMBAR FUSION DECOMPRESSON WITH PEDICLE SCREWS L4-5, LAMINECTOMY, FORAMINOTOMY RIGHT L3-4;  Surgeon: Yoni Borja MD;  Location: [facility];  Service: Neurosurgery       Social History     Socioeconomic History   • Marital status:      Spouse name: Claude   • Number of children: Not on file   • Years of education: Not on file   • Highest education level: Not on file   Tobacco Use   • Smoking status: Never Smoker   • Smokeless tobacco: Never Used   Vaping Use   • Vaping Use: Never used   Substance and Sexual Activity   • Alcohol use: No   • Drug use: No   • Sexual activity: Defer         Review of Systems   Constitutional: Negative for activity change, appetite change, chills, diaphoresis, fatigue, fever and unexpected weight change.   HENT: Negative for congestion, dental problem, drooling, ear discharge, ear pain, facial swelling, hearing loss, mouth sores, nosebleeds, postnasal drip, rhinorrhea, sinus pressure, sinus pain, sneezing, sore throat, tinnitus, trouble swallowing and voice change.    Eyes: Negative  "for photophobia, pain, discharge, redness, itching and visual disturbance.   Respiratory: Negative for apnea, cough, choking, chest tightness, shortness of breath, wheezing and stridor.    Cardiovascular: Negative for chest pain, palpitations and leg swelling.   Gastrointestinal: Negative for abdominal distention, abdominal pain, anal bleeding, blood in stool, constipation, diarrhea, nausea, rectal pain and vomiting.   Endocrine: Negative for cold intolerance, heat intolerance, polydipsia, polyphagia and polyuria.   Genitourinary: Negative for decreased urine volume, difficulty urinating, dysuria, enuresis, flank pain, frequency, genital sores, hematuria and urgency.   Musculoskeletal: Positive for back pain. Negative for arthralgias, gait problem, joint swelling, myalgias, neck pain and neck stiffness.   Skin: Negative for color change, pallor, rash and wound.   Allergic/Immunologic: Negative for environmental allergies, food allergies and immunocompromised state.   Neurological: Negative for dizziness, tremors, seizures, syncope, facial asymmetry, speech difficulty, weakness, light-headedness, numbness and headaches.   Hematological: Negative for adenopathy. Does not bruise/bleed easily.   Psychiatric/Behavioral: Negative for agitation, behavioral problems, confusion, decreased concentration, dysphoric mood, hallucinations, self-injury, sleep disturbance and suicidal ideas. The patient is not nervous/anxious and is not hyperactive.    All other systems reviewed and are negative.      Objective   Vital Signs: Blood pressure 134/72, temperature 97.1 °F (36.2 °C), temperature source Infrared, height 160 cm (62.99\"), weight 81.6 kg (179 lb 12.8 oz), not currently breastfeeding.  Physical Exam  Vitals and nursing note reviewed.   Constitutional:       General: She is not in acute distress.     Appearance: She is well-developed.   HENT:      Head: Normocephalic and atraumatic.   Eyes:      Pupils: Pupils are equal, " round, and reactive to light.   Cardiovascular:      Heart sounds: Normal heart sounds.   Pulmonary:      Breath sounds: Normal breath sounds.   Psychiatric:         Behavior: Behavior normal.         Thought Content: Thought content normal.     Musculoskeletal:     Strength is intact in upper and lower extremities to direct testing.     Station and gait are normal.     Straight leg raising is negative.   Neurologic:     Muscle tone is normal throughout.     Coordination is intact.     Sensation is intact to light touch throughout.     Patient is oriented to person, place, and time.         Independent review of radiographic imaging: Plain films of the lumbar spine demonstrate surgical construct intact at L4-5.  No hardware malfunction.  Diminished bone density noted throughout.    MRI of lumbar spine demonstrates postoperative fusion changes at L4-5.  At that level at L3-4 there is bilateral foraminal stenosis.  At L2-3 there is leftward disc osteophyte protrusion narrowing the recess.        Assessment/Plan   Diagnosis:   1.  Adjacent level stenosis with neurogenic claudication, L3-4  2.  Left lumbar radiculopathy L2-3    Medical Decision Making: Patient's activities have become quite limited due to her walking and standing intolerance.  Therefore Dr. Borja has recommended extending her fusion to L3-4 level.  Additionally we will explore the L2-3 foramen with possible discectomy.  The general nature of the procedure as well as risk, limitations, complications and alternatives to the procedure were discussed with patient.  She wishes to proceed.      Diagnoses and all orders for this visit:    1. Multilevel degenerative disc disease (Primary)    2. Lumbar radiculopathy    3. Pain in joint of right shoulder    4. History of lumbar fusion    5. Spinal stenosis, lumbar region, with neurogenic claudication                    Patient's Body mass index is 31.86 kg/m². indicating that she is obese (BMI >30).  Obesity-related health conditions include the following: none. Obesity is unchanged. BMI is is above average; BMI management plan is completed. We discussed portion control and increasing exercise..         Ave Chanel PA-C  Patient Care Team:  Cari Rodriguez MD as PCP - General (Internal Medicine)  Cari Rodriguez MD as Referring Physician (Internal Medicine)

## 2021-08-04 NOTE — H&P
Patient: Eneida Bartholomew  : 1950  Chart #: [id number]    Date of Service: 2021    CHIEF COMPLAINT: Low back and left leg pain with walking and standing intolerance    History of Present Illness Ms. Bartholomew is a very pleasant 70-year-old woman who is well-known to Dr. Borja's service.  On 2019 she underwent right L3-4 foraminotomies as well as PLIF at L4-5.  A dural rent was encountered intraoperatively.  Overall she has done well.  She underwent right knee replacement last fall.  Her recovery from that has been difficult.  In addition, she has had recurrent low back pain that radiates down the lateral aspect of the left leg.  Symptoms are pronounced when standing and walking.  In recent months she has been unable to effectively complete her physical therapy due to standing intolerance.  She has resigned to cooking while sitting on a stool.  She went to the emergency room and was treated with a Toradol injection.  Symptoms improved for a few days and then recurred.  She denies significant right sided symptoms.  No bowel or bladder difficulties.      Past Medical History:   Diagnosis Date   • Arthritis    • Bronchitis    • Coronary artery disease    • Fibromyalgia    • GERD (gastroesophageal reflux disease)    • Glaucoma    • History of migraine    • Hypertension    • UTI (urinary tract infection)          Current Outpatient Medications:   •  amLODIPine (NORVASC) 10 MG tablet, 10 mg Daily., Disp: , Rfl:   •  aspirin 81 MG EC tablet, Take 81 mg by mouth Daily., Disp: , Rfl:   •  baclofen (LIORESAL) 10 MG tablet, 10 mg 3 (Three) Times a Day As Needed., Disp: , Rfl:   •  benazepril (LOTENSIN) 40 MG tablet, Take 40 mg by mouth Daily., Disp: , Rfl:   •  hydroCHLOROthiazide (HYDRODIURIL) 25 MG tablet, , Disp: , Rfl:   •  ibuprofen (ADVIL,MOTRIN) 800 MG tablet, Take 800 mg by mouth As Needed for Mild Pain ., Disp: , Rfl:   •  latanoprost (XALATAN) 0.005 % ophthalmic solution, Administer  to both eyes  Every Night., Disp: , Rfl:   •  metoprolol succinate XL (TOPROL-XL) 100 MG 24 hr tablet, 100 mg Daily., Disp: , Rfl:   •  Multiple Vitamins-Minerals (MULTIVITAL PO), Take  by mouth Daily., Disp: , Rfl:   •  omeprazole (priLOSEC) 20 MG capsule, 20 mg Daily., Disp: , Rfl:   •  oxybutynin XL (DITROPAN-XL) 10 MG 24 hr tablet, Take 10 mg by mouth Daily., Disp: , Rfl:   •  Restasis 0.05 % ophthalmic emulsion, , Disp: , Rfl:   •  rosuvastatin (CRESTOR) 40 MG tablet, , Disp: , Rfl:   •  vitamin E 400 UNIT capsule, Take 400 Units by mouth Daily., Disp: , Rfl:     Past Surgical History:   Procedure Laterality Date   • BREAST BIOPSY Right    • BUNIONECTOMY Bilateral     x4   • CATARACT EXTRACTION EXTRACAPSULAR W/ INTRAOCULAR LENS IMPLANTATION Bilateral    • CHOLECYSTECTOMY     • CORONARY ANGIOPLASTY WITH STENT PLACEMENT     • KNEE ARTHROSCOPY Right 2021   • LUMBAR LAMINECTOMY WITH FUSION N/A 5/2/2019    Procedure: LUMBAR FUSION DECOMPRESSON WITH PEDICLE SCREWS L4-5, LAMINECTOMY, FORAMINOTOMY RIGHT L3-4;  Surgeon: Yoni Borja MD;  Location: [facility];  Service: Neurosurgery       Social History     Socioeconomic History   • Marital status:      Spouse name: Claude   • Number of children: Not on file   • Years of education: Not on file   • Highest education level: Not on file   Tobacco Use   • Smoking status: Never Smoker   • Smokeless tobacco: Never Used   Vaping Use   • Vaping Use: Never used   Substance and Sexual Activity   • Alcohol use: No   • Drug use: No   • Sexual activity: Defer     Allergies   Allergen Reactions   • Prednisone Shortness Of Breath     And nervous   • Erythromycin Rash   • Sulfa Antibiotics Rash         Review of Systems   Constitutional: Negative for activity change, appetite change, chills, diaphoresis, fatigue, fever and unexpected weight change.   HENT: Negative for congestion, dental problem, drooling, ear discharge, ear pain, facial swelling, hearing loss, mouth sores, nosebleeds,  "postnasal drip, rhinorrhea, sinus pressure, sinus pain, sneezing, sore throat, tinnitus, trouble swallowing and voice change.    Eyes: Negative for photophobia, pain, discharge, redness, itching and visual disturbance.   Respiratory: Negative for apnea, cough, choking, chest tightness, shortness of breath, wheezing and stridor.    Cardiovascular: Negative for chest pain, palpitations and leg swelling.   Gastrointestinal: Negative for abdominal distention, abdominal pain, anal bleeding, blood in stool, constipation, diarrhea, nausea, rectal pain and vomiting.   Endocrine: Negative for cold intolerance, heat intolerance, polydipsia, polyphagia and polyuria.   Genitourinary: Negative for decreased urine volume, difficulty urinating, dysuria, enuresis, flank pain, frequency, genital sores, hematuria and urgency.   Musculoskeletal: Positive for back pain. Negative for arthralgias, gait problem, joint swelling, myalgias, neck pain and neck stiffness.   Skin: Negative for color change, pallor, rash and wound.   Allergic/Immunologic: Negative for environmental allergies, food allergies and immunocompromised state.   Neurological: Negative for dizziness, tremors, seizures, syncope, facial asymmetry, speech difficulty, weakness, light-headedness, numbness and headaches.   Hematological: Negative for adenopathy. Does not bruise/bleed easily.   Psychiatric/Behavioral: Negative for agitation, behavioral problems, confusion, decreased concentration, dysphoric mood, hallucinations, self-injury, sleep disturbance and suicidal ideas. The patient is not nervous/anxious and is not hyperactive.    All other systems reviewed and are negative.      Objective   Vital Signs: Blood pressure 134/72, temperature 97.1 °F (36.2 °C), temperature source Infrared, height 160 cm (62.99\"), weight 81.6 kg (179 lb 12.8 oz), not currently breastfeeding.  Physical Exam  Vitals and nursing note reviewed.   Constitutional:       General: She is not in " acute distress.     Appearance: She is well-developed.   HENT:      Head: Normocephalic and atraumatic.   Eyes:      Pupils: Pupils are equal, round, and reactive to light.   Cardiovascular:      Heart sounds: Normal heart sounds.   Pulmonary:      Breath sounds: Normal breath sounds.   Psychiatric:         Behavior: Behavior normal.         Thought Content: Thought content normal.   Musculoskeletal:     Strength is intact in upper and lower extremities to direct testing.     Station and gait are normal.     Straight leg raising is negative.   Neurologic:     Muscle tone is normal throughout.     Coordination is intact.     Sensation is intact to light touch throughout.     Patient is oriented to person, place, and time.         Independent review of radiographic imaging: Plain films of the lumbar spine demonstrate surgical construct intact at L4-5.  No hardware malfunction.  Diminished bone density noted throughout.    MRI of lumbar spine demonstrates postoperative fusion changes at L4-5.  At that level at L3-4 there is bilateral foraminal stenosis.  At L2-3 there is leftward disc osteophyte protrusion narrowing the recess.        Assessment/Plan   Diagnosis:   1.  Adjacent level stenosis with neurogenic claudication, L3-4  2.  Left lumbar radiculopathy L2-3    Medical Decision Making: Patient's activities have become quite limited due to her walking and standing intolerance.  Therefore Dr. Borja has recommended extending her fusion to L3-4 level.  Additionally we will explore the L2-3 foramen with possible discectomy.  The general nature of the procedure as well as risk, limitations, complications and alternatives to the procedure were discussed with patient.  She wishes to proceed.      Diagnoses and all orders for this visit:    1. Multilevel degenerative disc disease (Primary)    2. Lumbar radiculopathy    3. Pain in joint of right shoulder    4. History of lumbar fusion    5. Spinal stenosis, lumbar region,  with neurogenic claudication                    Patient's Body mass index is 31.86 kg/m². indicating that she is obese (BMI >30). Obesity-related health conditions include the following: none. Obesity is unchanged. BMI is is above average; BMI management plan is completed. We discussed portion control and increasing exercise..         Ave Chanel PA-C  Patient Care Team:  Cari Rodriguez MD as PCP - General (Internal Medicine)  Cari Rodriguez MD as Referring Physician (Internal Medicine)

## 2021-08-05 DIAGNOSIS — M54.16 LUMBAR RADICULOPATHY: ICD-10-CM

## 2021-08-05 DIAGNOSIS — Z98.1 HISTORY OF LUMBAR FUSION: ICD-10-CM

## 2021-08-05 DIAGNOSIS — M25.511 PAIN IN JOINT OF RIGHT SHOULDER: ICD-10-CM

## 2021-08-05 DIAGNOSIS — M53.9 MULTILEVEL DEGENERATIVE DISC DISEASE: Primary | ICD-10-CM

## 2021-08-05 RX ORDER — CHLORHEXIDINE GLUCONATE 4 G/100ML
SOLUTION TOPICAL
Qty: 120 ML | Refills: 0 | Status: SHIPPED | OUTPATIENT
Start: 2021-08-05 | End: 2021-10-20

## 2021-09-15 ENCOUNTER — TELEPHONE (OUTPATIENT)
Dept: NEUROSURGERY | Facility: CLINIC | Age: 71
End: 2021-09-15

## 2021-09-15 RX ORDER — GABAPENTIN 300 MG/1
CAPSULE ORAL
Qty: 90 CAPSULE | Refills: 0 | Status: SHIPPED | OUTPATIENT
Start: 2021-09-15 | End: 2021-10-20

## 2021-09-15 NOTE — TELEPHONE ENCOUNTER
Provider:  Jonh  Caller: patient  Time of call:   11:43  Phone #:  [phone]  Surgery:    Surgery Date:    Last visit:   08/04/21  Next visit: DIVINA    JOVANA:     09/23/2020 Acetaminophen/Codeine 300MG/30MG 1950 50 12 THO NICE [facility] [address] 19 1  12/08/2020 Oxycodone/Acetaminophen 325MG/5MG 1950 20 20 ROSLYN MORALES [facility] [address] 8 1  12/12/2020 Oxycodone/Acetaminophen 325MG/5MG 1950 40 6 THO NICE [facility] [address] 50 1  12/29/2020 Oxycodone/Acetaminophen 325MG/5MG 1950 40 10 [facility] [address] 30 1    Reason for call:     Patient called and said her surgery has been postponed.  She wants to know if there is anything we can do about her pinched nerve pain?

## 2021-09-17 ENCOUNTER — APPOINTMENT (OUTPATIENT)
Dept: PREADMISSION TESTING | Facility: HOSPITAL | Age: 71
End: 2021-09-17

## 2021-09-20 NOTE — TELEPHONE ENCOUNTER
"Patient returned Masha's call. The gabapentin \"seems to be helping\". Patient said she has not had any to take any other meds for breakthrough pain.  "

## 2021-10-13 ENCOUNTER — TELEPHONE (OUTPATIENT)
Dept: NEUROSURGERY | Facility: CLINIC | Age: 71
End: 2021-10-13

## 2021-10-13 NOTE — TELEPHONE ENCOUNTER
Caller: Eneida Bartholomew    Relationship: Self    Best call back number:[phone]    What is the best time to reach you:ANYTIME    Who are you requesting to speak with (clinical staff, provider,  specific staff member):RYANNE HOFFMAN    Do you know the name of the person who called:RYANNE HOFFMAN    What was the call regarding:PT IS RETURNING ALICIA' CALL FOR SURGERY-PT STATES THAT SHE TRIED TO CALL BACK BUT WAS UNABLE TO LEAVE A MESSAGE. PLEASE ADVISE THANK YOU    Do you require a callback:YES

## 2021-10-20 ENCOUNTER — OFFICE VISIT (OUTPATIENT)
Dept: CARDIOLOGY | Facility: CLINIC | Age: 71
End: 2021-10-20

## 2021-10-20 VITALS
HEART RATE: 71 BPM | HEIGHT: 63 IN | OXYGEN SATURATION: 97 % | DIASTOLIC BLOOD PRESSURE: 74 MMHG | WEIGHT: 176 LBS | SYSTOLIC BLOOD PRESSURE: 130 MMHG | BODY MASS INDEX: 31.18 KG/M2

## 2021-10-20 DIAGNOSIS — I25.10 CORONARY ARTERY DISEASE INVOLVING NATIVE CORONARY ARTERY OF NATIVE HEART WITHOUT ANGINA PECTORIS: Primary | ICD-10-CM

## 2021-10-20 DIAGNOSIS — E78.00 PURE HYPERCHOLESTEROLEMIA: ICD-10-CM

## 2021-10-20 DIAGNOSIS — Z01.810 PREOP CARDIOVASCULAR EXAM: ICD-10-CM

## 2021-10-20 DIAGNOSIS — I10 PRIMARY HYPERTENSION: ICD-10-CM

## 2021-10-20 PROCEDURE — 93000 ELECTROCARDIOGRAM COMPLETE: CPT | Performed by: INTERNAL MEDICINE

## 2021-10-20 PROCEDURE — 99204 OFFICE O/P NEW MOD 45 MIN: CPT | Performed by: INTERNAL MEDICINE

## 2021-10-20 NOTE — PROGRESS NOTES
Subjective:     Encounter Date:10/20/2021    Primary Care Physician: Cari Rodriguez MD      Patient ID: Eneida Bartholomew is a 71 y.o. female.    Chief Complaint:Establish Care and Leg Swelling      PROBLEM LIST:  1. Coronary artery disease:  1. 2005, abnormal Cardiolite.  2. Cardiac catheterization, Dr. Daniel with 75% to 80% mid LAD, 20% proximal circumflex, 25% proximal RCA with a small AV fistula from the first diagonal to the coronary sinus.  3. 2005, LAD stenting with a 3.0 x 28 mm Cypher JORDYN by Dr. Tyson Pulliam.  4. 10/14/2020 [facility] for high risk myocardial perfusion study.  Dr. Vidales.  Patent stent in the LAD.  Diagonal free of disease.  Circumflex normal.  RCA 40 to 50% proximal stenosis, normal IFR.  2. Hypertension.  1. Echo, 12/2014 with an EF of 65% to 70%, mild tricuspid regurgitation and mild concentric LVH.    3. Dyslipidemia.  4. COVID-19 9/2021  1. Did not require hospitalization.  Received infusion therapy.  5. RBBB  6. GERD.  7. History of peptic ulcer disease.  8. Overactive bladder.  9. Arthritis.  10. Anxiety.  11. Fibromyalgia.  12. Surgeries:  1. Cholecystectomy.  2. Right foot surgery x3.  3. Left foot surgery x3.  4. Remote lumpectomy, reportedly benign.  5. Cataract extraction  6. Lumbar laminectomy   7. Knee surgery        Allergies   Allergen Reactions   • Prednisone Shortness Of Breath     And nervous   • Erythromycin Rash   • Sulfa Antibiotics Rash         Current Outpatient Medications:   •  amLODIPine (NORVASC) 10 MG tablet, 10 mg Daily., Disp: , Rfl:   •  aspirin 81 MG EC tablet, Take 81 mg by mouth Daily., Disp: , Rfl:   •  baclofen (LIORESAL) 10 MG tablet, 10 mg 3 (Three) Times a Day As Needed., Disp: , Rfl:   •  benazepril (LOTENSIN) 40 MG tablet, Take 40 mg by mouth Daily., Disp: , Rfl:   •  hydroCHLOROthiazide (HYDRODIURIL) 25 MG tablet, Take 25 mg by mouth Daily., Disp: , Rfl:   •  ibuprofen (ADVIL,MOTRIN) 800 MG tablet, Take 800 mg by mouth As Needed for Mild  Pain ., Disp: , Rfl:   •  metoprolol succinate XL (TOPROL-XL) 100 MG 24 hr tablet, 100 mg Daily., Disp: , Rfl:   •  Multiple Vitamins-Minerals (MULTIVITAL PO), Take  by mouth Daily., Disp: , Rfl:   •  mupirocin (Bactroban) 2 % ointment, Apply to the inside of each nostril with a cotton swab bid, morning and evening 5 days before surgery., Disp: 22 g, Rfl: 0  •  omeprazole (priLOSEC) 20 MG capsule, 20 mg Daily., Disp: , Rfl:   •  oxybutynin XL (DITROPAN-XL) 10 MG 24 hr tablet, Take 10 mg by mouth Daily., Disp: , Rfl:   •  Restasis 0.05 % ophthalmic emulsion, , Disp: , Rfl:   •  rosuvastatin (CRESTOR) 40 MG tablet, , Disp: , Rfl:   •  vitamin E 400 UNIT capsule, Take 400 Units by mouth Daily., Disp: , Rfl:         History of Present Illness    Patient is a 71-year-old  female who is being seen today first [facility] cardiac care as well as preoperative evaluation prior to back surgery.  Patient is scheduled undergo back surgery in December with Dr. Borja.  Patient reports since having Covid in September she has some intermittent sharp chest discomfort.  No significant triggering factors noted.  States that overall she is gradually improving since having Covid but still has some exertional fatigue and dyspnea.  No syncope, near syncope or worsened edema.  Still has intermittent dry cough.  Wanted to make sure that her heart was okay prior to proceeding with upcoming back surgery.    The following portions of the patient's history were reviewed and updated as appropriate: allergies, current medications, past family history, past medical history, past social history, past surgical history and problem list.    Family History   Problem Relation Age of Onset   • Cancer Mother    • Heart disease Father        Social History     Tobacco Use   • Smoking status: Never Smoker   • Smokeless tobacco: Never Used   Vaping Use   • Vaping Use: Never used   Substance Use Topics   • Alcohol use: No   • Drug use: No         Review  "of Systems   Constitutional: Positive for malaise/fatigue and weight gain. Negative for fever.   HENT: Positive for tinnitus. Negative for nosebleeds.    Eyes: Negative for redness and visual disturbance.   Cardiovascular: Positive for leg swelling. Negative for orthopnea, palpitations and paroxysmal nocturnal dyspnea.   Respiratory: Negative for cough, snoring, sputum production and wheezing.    Hematologic/Lymphatic: Negative for bleeding problem.   Skin: Negative for flushing, itching and rash.   Musculoskeletal: Negative for falls, joint pain and muscle cramps.   Gastrointestinal: Positive for heartburn. Negative for abdominal pain, diarrhea, nausea and vomiting.   Genitourinary: Negative for hematuria.   Neurological: Negative for excessive daytime sleepiness, dizziness, headaches, tremors and weakness.   Psychiatric/Behavioral: Negative for substance abuse. The patient is not nervous/anxious.           Objective:   /74   Pulse 71   Ht 160 cm (63\")   Wt 79.8 kg (176 lb)   SpO2 97%   BMI 31.18 kg/m²         Vitals reviewed.   Constitutional:       Appearance: Healthy appearance. Well-developed and not in distress.   Eyes:      Conjunctiva/sclera: Conjunctivae normal.      Pupils: Pupils are equal, round, and reactive to light.   HENT:      Head: Normocephalic and atraumatic.    Mouth/Throat:      Pharynx: Oropharynx is clear.   Neck:      Thyroid: Thyroid normal. No thyromegaly.      Vascular: Normal carotid pulses. No carotid bruit or JVD. JVD normal.      Lymphadenopathy: No cervical adenopathy.   Pulmonary:      Effort: No respiratory distress.      Breath sounds: No wheezing. No rales.   Chest:      Chest wall: Not tender to palpatation.   Cardiovascular:      Normal rate. Regular rhythm.      No gallop.   Pulses:     Carotid: 2+ bilaterally.     Dorsalis pedis: 2+ bilaterally.     Posterior tibial: 2+ bilaterally.  Abdominal:      General: There is no distension or abdominal bruit.      " Palpations: There is no abdominal mass.      Tenderness: There is no abdominal tenderness. There is no rebound.   Musculoskeletal:         General: No tenderness or deformity.      Extremities: No clubbing present.Skin:     General: Skin is warm and dry. There is no cyanosis.      Findings: No rash.   Neurological:      Mental Status: Alert, oriented to person, place, and time and oriented to person, place and time.           ECG 12 Lead    Date/Time: 10/20/2021 12:32 PM  Performed by: Cesar Funez MD  Authorized by: Cesar Funez MD   Comparison: compared with previous ECG from 4/29/2019  Similar to previous ECG  Comparison to previous ECG: Intraventricular conduction delay now right bundle branch block.  Rhythm: sinus rhythm  Ectopy: infrequent PVCs  Conduction: right bundle branch block    Clinical impression: abnormal EKG                  Assessment:   Assessment/Plan      Diagnoses and all orders for this visit:    1. Coronary artery disease involving native coronary artery of native heart without angina pectoris (Primary)  -     ECG 12 Lead    2. Primary hypertension    3. Pure hypercholesterolemia    4. Preop cardiovascular exam      1.  Preoperative valuation lumbar spine surgery.  Low cardiovascular risk  2.  Coronary artery disease.  Cardiac cath performed last year showed nonflow limiting RCA disease.  No angina or dyspnea.  3.  Hypertension currently well controlled  4.  Dyslipidemia controlled on high intensity statin    Recommendations:  1.  Low cardiovascular upcoming lumbar spine surgery proceed as planned without further testing.  2.  Continue current medical therapy  3 visit.  Revisit annually appearance symptoms change       Mandi PATEL scribed portions of this dictation for Dr. Cesar Funez.   I have seen and examined the patient, I have reviewed the note, discussed the case with the advance practice clinician, made necessary changes and I agree with the final note.    Cesar  MD Armin  10/20/21  12:37 EDT      Dictated utilizing Dragon dictation

## 2021-12-27 ENCOUNTER — TELEPHONE (OUTPATIENT)
Dept: NEUROSURGERY | Facility: CLINIC | Age: 71
End: 2021-12-27

## 2021-12-27 DIAGNOSIS — M48.062 SPINAL STENOSIS, LUMBAR REGION, WITH NEUROGENIC CLAUDICATION: Primary | ICD-10-CM

## 2021-12-27 NOTE — TELEPHONE ENCOUNTER
Provider:  Jonh  Caller: Patient  Time of call:  2:09  Phone #: [phone]    Surgery:  LUMBAR FUSION extension DECOMPRESSON WITH PEDICLE SCREWS L3-4, with exploration L2-3, left, possible discectomy  Surgery Date: 1950    Last visit:  Office Visit with Ave Chanel PA-C (08/04/2021)    Next visit: Surgery    Reason for call:         Patient LVM on the clinical line requesting a hospital bed she can use after her surgery next Monday.

## 2021-12-27 NOTE — TELEPHONE ENCOUNTER
Attempted to contact patient about hospital bed but no answer. LVM to call our office tomorrow or we will reach out to her instead.

## 2021-12-28 NOTE — TELEPHONE ENCOUNTER
I spoke to patient and let her know that an order for a hospital bed has been placed for her. Patient is okay with mailing the letter to her home address. I confirmed the address on file with the patient. She was thankful for the call.

## 2021-12-29 ENCOUNTER — PREP FOR SURGERY (OUTPATIENT)
Dept: OTHER | Facility: HOSPITAL | Age: 71
End: 2021-12-29

## 2021-12-29 ENCOUNTER — TELEPHONE (OUTPATIENT)
Dept: NEUROSURGERY | Facility: CLINIC | Age: 71
End: 2021-12-29

## 2021-12-29 DIAGNOSIS — M48.062 LUMBAR STENOSIS WITH NEUROGENIC CLAUDICATION: Primary | ICD-10-CM

## 2021-12-30 ENCOUNTER — PRE-ADMISSION TESTING (OUTPATIENT)
Dept: PREADMISSION TESTING | Facility: HOSPITAL | Age: 71
End: 2021-12-30

## 2021-12-30 VITALS — WEIGHT: 176.92 LBS | BODY MASS INDEX: 31.35 KG/M2 | HEIGHT: 63 IN

## 2021-12-30 LAB — HBA1C MFR BLD: 6.1 % (ref 4.8–5.6)

## 2021-12-30 PROCEDURE — 83036 HEMOGLOBIN GLYCOSYLATED A1C: CPT

## 2021-12-30 PROCEDURE — 87081 CULTURE SCREEN ONLY: CPT

## 2021-12-30 PROCEDURE — 80053 COMPREHEN METABOLIC PANEL: CPT | Performed by: PHYSICIAN ASSISTANT

## 2021-12-30 PROCEDURE — 85025 COMPLETE CBC W/AUTO DIFF WBC: CPT | Performed by: PHYSICIAN ASSISTANT

## 2021-12-31 ENCOUNTER — APPOINTMENT (OUTPATIENT)
Dept: PREADMISSION TESTING | Facility: HOSPITAL | Age: 71
End: 2021-12-31

## 2021-12-31 DIAGNOSIS — M48.062 LUMBAR STENOSIS WITH NEUROGENIC CLAUDICATION: ICD-10-CM

## 2021-12-31 PROCEDURE — C9803 HOPD COVID-19 SPEC COLLECT: HCPCS

## 2021-12-31 PROCEDURE — U0004 COV-19 TEST NON-CDC HGH THRU: HCPCS

## 2022-01-01 ENCOUNTER — ANESTHESIA EVENT (OUTPATIENT)
Dept: PERIOP | Facility: HOSPITAL | Age: 72
End: 2022-01-01

## 2022-01-01 LAB
MRSA SPEC QL CULT: NORMAL
SARS-COV-2 RNA PNL SPEC NAA+PROBE: NOT DETECTED

## 2022-01-01 RX ORDER — FAMOTIDINE 10 MG/ML
20 INJECTION, SOLUTION INTRAVENOUS ONCE
Status: CANCELLED | OUTPATIENT
Start: 2022-01-01 | End: 2022-01-01

## 2022-01-03 ENCOUNTER — APPOINTMENT (OUTPATIENT)
Dept: GENERAL RADIOLOGY | Facility: HOSPITAL | Age: 72
End: 2022-01-03

## 2022-01-03 ENCOUNTER — HOSPITAL ENCOUNTER (INPATIENT)
Facility: HOSPITAL | Age: 72
LOS: 3 days | Discharge: HOME | End: 2022-01-06
Attending: NEUROLOGICAL SURGERY | Admitting: NEUROLOGICAL SURGERY

## 2022-01-03 ENCOUNTER — ANESTHESIA (OUTPATIENT)
Dept: PERIOP | Facility: HOSPITAL | Age: 72
End: 2022-01-03

## 2022-01-03 DIAGNOSIS — M48.062 SPINAL STENOSIS, LUMBAR REGION, WITH NEUROGENIC CLAUDICATION: ICD-10-CM

## 2022-01-03 DIAGNOSIS — M48.062 LUMBAR STENOSIS WITH NEUROGENIC CLAUDICATION: Primary | ICD-10-CM

## 2022-01-03 PROCEDURE — 25010000002 ONDANSETRON PER 1 MG: Performed by: ANESTHESIOLOGY

## 2022-01-03 PROCEDURE — C1713 ANCHOR/SCREW BN/BN,TIS/BN: HCPCS | Performed by: NEUROLOGICAL SURGERY

## 2022-01-03 PROCEDURE — 0SB20ZZ EXCISION OF LUMBAR VERTEBRAL DISC, OPEN APPROACH: ICD-10-PCS | Performed by: NEUROLOGICAL SURGERY

## 2022-01-03 PROCEDURE — C1889 IMPLANT/INSERT DEVICE, NOC: HCPCS | Performed by: NEUROLOGICAL SURGERY

## 2022-01-03 PROCEDURE — 63710000001 ACETAMINOPHEN 325 MG TABLET: Performed by: NEUROLOGICAL SURGERY

## 2022-01-03 PROCEDURE — 22853 INSJ BIOMECHANICAL DEVICE: CPT | Performed by: NEUROLOGICAL SURGERY

## 2022-01-03 PROCEDURE — 63030 LAMOT DCMPRN NRV RT 1 LMBR: CPT | Performed by: NEUROLOGICAL SURGERY

## 2022-01-03 PROCEDURE — 25010000002 VANCOMYCIN 1 G RECONSTITUTED SOLUTION: Performed by: NEUROLOGICAL SURGERY

## 2022-01-03 PROCEDURE — 25010000002 PROPOFOL 10 MG/ML EMULSION: Performed by: NURSE ANESTHETIST, CERTIFIED REGISTERED

## 2022-01-03 PROCEDURE — 63710000001 OXYCODONE-ACETAMINOPHEN 7.5-325 MG TABLET: Performed by: NEUROLOGICAL SURGERY

## 2022-01-03 PROCEDURE — 0SG00AJ FUSION OF LUMBAR VERTEBRAL JOINT WITH INTERBODY FUSION DEVICE, POSTERIOR APPROACH, ANTERIOR COLUMN, OPEN APPROACH: ICD-10-PCS | Performed by: NEUROLOGICAL SURGERY

## 2022-01-03 PROCEDURE — 22840 INSERT SPINE FIXATION DEVICE: CPT | Performed by: PHYSICIAN ASSISTANT

## 2022-01-03 PROCEDURE — 63052 LAM FACETC/FRMT ARTHRD LUM 1: CPT | Performed by: PHYSICIAN ASSISTANT

## 2022-01-03 PROCEDURE — 63710000001 IBUPROFEN 600 MG TABLET: Performed by: NEUROLOGICAL SURGERY

## 2022-01-03 PROCEDURE — 0 CEFAZOLIN IN DEXTROSE 2-4 GM/100ML-% SOLUTION: Performed by: PHYSICIAN ASSISTANT

## 2022-01-03 PROCEDURE — 25010000002 FENTANYL CITRATE (PF) 50 MCG/ML SOLUTION

## 2022-01-03 PROCEDURE — 63710000001 LATANOPROST 0.005 % SOLUTION 2.5 ML BOTTLE: Performed by: NEUROLOGICAL SURGERY

## 2022-01-03 PROCEDURE — 22630 ARTHRD PST TQ 1NTRSPC LUM: CPT | Performed by: NEUROLOGICAL SURGERY

## 2022-01-03 PROCEDURE — 63710000001 CYCLOSPORINE 0.05 % EMULSION: Performed by: NEUROLOGICAL SURGERY

## 2022-01-03 PROCEDURE — 25010000002 HYDROMORPHONE 1 MG/ML SOLUTION

## 2022-01-03 PROCEDURE — 25010000002 FENTANYL CITRATE (PF) 50 MCG/ML SOLUTION: Performed by: NURSE ANESTHETIST, CERTIFIED REGISTERED

## 2022-01-03 PROCEDURE — 63052 LAM FACETC/FRMT ARTHRD LUM 1: CPT | Performed by: NEUROLOGICAL SURGERY

## 2022-01-03 PROCEDURE — A9270 NON-COVERED ITEM OR SERVICE: HCPCS | Performed by: NEUROLOGICAL SURGERY

## 2022-01-03 PROCEDURE — 25010000002 FENTANYL CITRATE (PF) 50 MCG/ML SOLUTION: Performed by: ANESTHESIOLOGY

## 2022-01-03 PROCEDURE — 63030 LAMOT DCMPRN NRV RT 1 LMBR: CPT | Performed by: PHYSICIAN ASSISTANT

## 2022-01-03 PROCEDURE — 25010000002 PHENYLEPHRINE 10 MG/ML SOLUTION 1 ML VIAL: Performed by: NURSE ANESTHETIST, CERTIFIED REGISTERED

## 2022-01-03 PROCEDURE — 63710000001 ROSUVASTATIN 20 MG TABLET: Performed by: NEUROLOGICAL SURGERY

## 2022-01-03 PROCEDURE — 22840 INSERT SPINE FIXATION DEVICE: CPT | Performed by: NEUROLOGICAL SURGERY

## 2022-01-03 PROCEDURE — 25010000002 DEXAMETHASONE PER 1 MG: Performed by: NURSE ANESTHETIST, CERTIFIED REGISTERED

## 2022-01-03 PROCEDURE — 76000 FLUOROSCOPY <1 HR PHYS/QHP: CPT

## 2022-01-03 PROCEDURE — 22630 ARTHRD PST TQ 1NTRSPC LUM: CPT | Performed by: PHYSICIAN ASSISTANT

## 2022-01-03 PROCEDURE — 22853 INSJ BIOMECHANICAL DEVICE: CPT | Performed by: PHYSICIAN ASSISTANT

## 2022-01-03 DEVICE — HEMOST ABS SURGIFOAM SZ100 8X12 10MM: Type: IMPLANTABLE DEVICE | Site: SPINE LUMBAR | Status: FUNCTIONAL

## 2022-01-03 DEVICE — SPACER 3992209 22MM X 9MM
Type: IMPLANTABLE DEVICE | Site: SPINE LUMBAR | Status: FUNCTIONAL
Brand: CAPSTONE PTC™ SPINAL SYSTEM

## 2022-01-03 RX ORDER — AMOXICILLIN 250 MG
2 CAPSULE ORAL NIGHTLY PRN
Status: DISCONTINUED | OUTPATIENT
Start: 2022-01-03 | End: 2022-01-06 | Stop reason: HOSPADM

## 2022-01-03 RX ORDER — PROPOFOL 10 MG/ML
VIAL (ML) INTRAVENOUS AS NEEDED
Status: DISCONTINUED | OUTPATIENT
Start: 2022-01-03 | End: 2022-01-03 | Stop reason: SURG

## 2022-01-03 RX ORDER — ACETAMINOPHEN 325 MG/1
650 TABLET ORAL 3 TIMES DAILY
Status: DISCONTINUED | OUTPATIENT
Start: 2022-01-03 | End: 2022-01-06 | Stop reason: HOSPADM

## 2022-01-03 RX ORDER — MIDAZOLAM HYDROCHLORIDE 1 MG/ML
0.5 INJECTION INTRAMUSCULAR; INTRAVENOUS
Status: DISCONTINUED | OUTPATIENT
Start: 2022-01-03 | End: 2022-01-03 | Stop reason: HOSPADM

## 2022-01-03 RX ORDER — HYDROCHLOROTHIAZIDE 25 MG/1
25 TABLET ORAL DAILY
Status: DISCONTINUED | OUTPATIENT
Start: 2022-01-04 | End: 2022-01-06 | Stop reason: HOSPADM

## 2022-01-03 RX ORDER — LIDOCAINE HYDROCHLORIDE 10 MG/ML
0.5 INJECTION, SOLUTION EPIDURAL; INFILTRATION; INTRACAUDAL; PERINEURAL ONCE AS NEEDED
Status: COMPLETED | OUTPATIENT
Start: 2022-01-03 | End: 2022-01-03

## 2022-01-03 RX ORDER — METOPROLOL SUCCINATE 100 MG/1
100 TABLET, EXTENDED RELEASE ORAL
Status: DISCONTINUED | OUTPATIENT
Start: 2022-01-04 | End: 2022-01-06 | Stop reason: HOSPADM

## 2022-01-03 RX ORDER — NALOXONE HCL 0.4 MG/ML
0.4 VIAL (ML) INJECTION
Status: DISCONTINUED | OUTPATIENT
Start: 2022-01-03 | End: 2022-01-06 | Stop reason: HOSPADM

## 2022-01-03 RX ORDER — OXYCODONE AND ACETAMINOPHEN 7.5; 325 MG/1; MG/1
1 TABLET ORAL EVERY 4 HOURS PRN
Status: DISCONTINUED | OUTPATIENT
Start: 2022-01-03 | End: 2022-01-06 | Stop reason: HOSPADM

## 2022-01-03 RX ORDER — HYDROMORPHONE HYDROCHLORIDE 1 MG/ML
0.5 INJECTION, SOLUTION INTRAMUSCULAR; INTRAVENOUS; SUBCUTANEOUS
Status: DISCONTINUED | OUTPATIENT
Start: 2022-01-03 | End: 2022-01-03 | Stop reason: HOSPADM

## 2022-01-03 RX ORDER — ONDANSETRON 2 MG/ML
4 INJECTION INTRAMUSCULAR; INTRAVENOUS ONCE AS NEEDED
Status: DISCONTINUED | OUTPATIENT
Start: 2022-01-03 | End: 2022-01-03 | Stop reason: HOSPADM

## 2022-01-03 RX ORDER — SODIUM CHLORIDE 0.9 % (FLUSH) 0.9 %
10 SYRINGE (ML) INJECTION AS NEEDED
Status: DISCONTINUED | OUTPATIENT
Start: 2022-01-03 | End: 2022-01-03 | Stop reason: HOSPADM

## 2022-01-03 RX ORDER — ONDANSETRON 2 MG/ML
4 INJECTION INTRAMUSCULAR; INTRAVENOUS EVERY 6 HOURS PRN
Status: DISCONTINUED | OUTPATIENT
Start: 2022-01-03 | End: 2022-01-06 | Stop reason: HOSPADM

## 2022-01-03 RX ORDER — OXYBUTYNIN CHLORIDE 10 MG/1
10 TABLET, EXTENDED RELEASE ORAL DAILY
Status: DISCONTINUED | OUTPATIENT
Start: 2022-01-04 | End: 2022-01-06 | Stop reason: HOSPADM

## 2022-01-03 RX ORDER — BUPIVACAINE HYDROCHLORIDE AND EPINEPHRINE 2.5; 5 MG/ML; UG/ML
INJECTION, SOLUTION EPIDURAL; INFILTRATION; INTRACAUDAL; PERINEURAL AS NEEDED
Status: DISCONTINUED | OUTPATIENT
Start: 2022-01-03 | End: 2022-01-03 | Stop reason: HOSPADM

## 2022-01-03 RX ORDER — SODIUM CHLORIDE 0.9 % (FLUSH) 0.9 %
10 SYRINGE (ML) INJECTION EVERY 12 HOURS SCHEDULED
Status: DISCONTINUED | OUTPATIENT
Start: 2022-01-03 | End: 2022-01-06 | Stop reason: HOSPADM

## 2022-01-03 RX ORDER — ONDANSETRON 4 MG/1
4 TABLET, FILM COATED ORAL EVERY 6 HOURS PRN
Status: DISCONTINUED | OUTPATIENT
Start: 2022-01-03 | End: 2022-01-06 | Stop reason: HOSPADM

## 2022-01-03 RX ORDER — PANTOPRAZOLE SODIUM 40 MG/1
40 TABLET, DELAYED RELEASE ORAL DAILY
Status: DISCONTINUED | OUTPATIENT
Start: 2022-01-04 | End: 2022-01-06 | Stop reason: HOSPADM

## 2022-01-03 RX ORDER — CEFAZOLIN SODIUM 2 G/100ML
2 INJECTION, SOLUTION INTRAVENOUS EVERY 8 HOURS
Status: COMPLETED | OUTPATIENT
Start: 2022-01-04 | End: 2022-01-04

## 2022-01-03 RX ORDER — IBUPROFEN 600 MG/1
600 TABLET ORAL 3 TIMES DAILY
Status: DISCONTINUED | OUTPATIENT
Start: 2022-01-03 | End: 2022-01-06 | Stop reason: HOSPADM

## 2022-01-03 RX ORDER — BISACODYL 10 MG
10 SUPPOSITORY, RECTAL RECTAL DAILY PRN
Status: DISCONTINUED | OUTPATIENT
Start: 2022-01-03 | End: 2022-01-06 | Stop reason: HOSPADM

## 2022-01-03 RX ORDER — FENTANYL CITRATE 50 UG/ML
INJECTION, SOLUTION INTRAMUSCULAR; INTRAVENOUS AS NEEDED
Status: DISCONTINUED | OUTPATIENT
Start: 2022-01-03 | End: 2022-01-03 | Stop reason: SURG

## 2022-01-03 RX ORDER — MORPHINE SULFATE 10 MG/ML
5 INJECTION INTRAMUSCULAR; INTRAVENOUS; SUBCUTANEOUS EVERY 4 HOURS PRN
Status: DISCONTINUED | OUTPATIENT
Start: 2022-01-03 | End: 2022-01-06 | Stop reason: HOSPADM

## 2022-01-03 RX ORDER — CEFAZOLIN SODIUM 2 G/100ML
2 INJECTION, SOLUTION INTRAVENOUS ONCE
Status: COMPLETED | OUTPATIENT
Start: 2022-01-03 | End: 2022-01-03

## 2022-01-03 RX ORDER — DOCUSATE SODIUM 100 MG/1
100 CAPSULE, LIQUID FILLED ORAL 2 TIMES DAILY PRN
Status: DISCONTINUED | OUTPATIENT
Start: 2022-01-03 | End: 2022-01-06 | Stop reason: HOSPADM

## 2022-01-03 RX ORDER — SODIUM CHLORIDE 0.9 % (FLUSH) 0.9 %
10 SYRINGE (ML) INJECTION EVERY 12 HOURS SCHEDULED
Status: DISCONTINUED | OUTPATIENT
Start: 2022-01-03 | End: 2022-01-03 | Stop reason: HOSPADM

## 2022-01-03 RX ORDER — SODIUM CHLORIDE 9 MG/ML
100 INJECTION, SOLUTION INTRAVENOUS CONTINUOUS
Status: DISCONTINUED | OUTPATIENT
Start: 2022-01-03 | End: 2022-01-05

## 2022-01-03 RX ORDER — AMLODIPINE BESYLATE 10 MG/1
10 TABLET ORAL DAILY
Status: DISCONTINUED | OUTPATIENT
Start: 2022-01-04 | End: 2022-01-06 | Stop reason: HOSPADM

## 2022-01-03 RX ORDER — MORPHINE SULFATE 2 MG/ML
2 INJECTION, SOLUTION INTRAMUSCULAR; INTRAVENOUS EVERY 4 HOURS PRN
Status: DISCONTINUED | OUTPATIENT
Start: 2022-01-03 | End: 2022-01-06 | Stop reason: HOSPADM

## 2022-01-03 RX ORDER — SODIUM CHLORIDE, SODIUM LACTATE, POTASSIUM CHLORIDE, CALCIUM CHLORIDE 600; 310; 30; 20 MG/100ML; MG/100ML; MG/100ML; MG/100ML
9 INJECTION, SOLUTION INTRAVENOUS CONTINUOUS
Status: DISCONTINUED | OUTPATIENT
Start: 2022-01-03 | End: 2022-01-06 | Stop reason: HOSPADM

## 2022-01-03 RX ORDER — DROPERIDOL 2.5 MG/ML
0.62 INJECTION, SOLUTION INTRAMUSCULAR; INTRAVENOUS ONCE AS NEEDED
Status: DISCONTINUED | OUTPATIENT
Start: 2022-01-03 | End: 2022-01-03 | Stop reason: HOSPADM

## 2022-01-03 RX ORDER — SODIUM CHLORIDE 0.9 % (FLUSH) 0.9 %
10 SYRINGE (ML) INJECTION AS NEEDED
Status: DISCONTINUED | OUTPATIENT
Start: 2022-01-03 | End: 2022-01-06 | Stop reason: HOSPADM

## 2022-01-03 RX ORDER — LISINOPRIL 40 MG/1
40 TABLET ORAL
Status: DISCONTINUED | OUTPATIENT
Start: 2022-01-04 | End: 2022-01-06 | Stop reason: HOSPADM

## 2022-01-03 RX ORDER — SODIUM CHLORIDE, SODIUM LACTATE, POTASSIUM CHLORIDE, CALCIUM CHLORIDE 600; 310; 30; 20 MG/100ML; MG/100ML; MG/100ML; MG/100ML
90 INJECTION, SOLUTION INTRAVENOUS CONTINUOUS
Status: DISCONTINUED | OUTPATIENT
Start: 2022-01-03 | End: 2022-01-05

## 2022-01-03 RX ORDER — ROSUVASTATIN CALCIUM 20 MG/1
40 TABLET, COATED ORAL NIGHTLY
Status: DISCONTINUED | OUTPATIENT
Start: 2022-01-03 | End: 2022-01-06 | Stop reason: HOSPADM

## 2022-01-03 RX ORDER — FAMOTIDINE 20 MG/1
20 TABLET, FILM COATED ORAL ONCE
Status: COMPLETED | OUTPATIENT
Start: 2022-01-03 | End: 2022-01-03

## 2022-01-03 RX ORDER — DIPHENHYDRAMINE HCL 25 MG
25 CAPSULE ORAL NIGHTLY PRN
Status: DISCONTINUED | OUTPATIENT
Start: 2022-01-03 | End: 2022-01-06 | Stop reason: HOSPADM

## 2022-01-03 RX ORDER — BUPIVACAINE HCL/0.9 % NACL/PF 0.125 %
PLASTIC BAG, INJECTION (ML) EPIDURAL AS NEEDED
Status: DISCONTINUED | OUTPATIENT
Start: 2022-01-03 | End: 2022-01-03 | Stop reason: SURG

## 2022-01-03 RX ORDER — FENTANYL CITRATE 50 UG/ML
50 INJECTION, SOLUTION INTRAMUSCULAR; INTRAVENOUS
Status: DISCONTINUED | OUTPATIENT
Start: 2022-01-03 | End: 2022-01-03 | Stop reason: HOSPADM

## 2022-01-03 RX ORDER — VANCOMYCIN HYDROCHLORIDE 1 G/20ML
INJECTION, POWDER, LYOPHILIZED, FOR SOLUTION INTRAVENOUS AS NEEDED
Status: DISCONTINUED | OUTPATIENT
Start: 2022-01-03 | End: 2022-01-03 | Stop reason: HOSPADM

## 2022-01-03 RX ORDER — PROMETHAZINE HYDROCHLORIDE 12.5 MG/1
12.5 TABLET ORAL EVERY 6 HOURS PRN
Status: DISCONTINUED | OUTPATIENT
Start: 2022-01-03 | End: 2022-01-06 | Stop reason: HOSPADM

## 2022-01-03 RX ORDER — CYCLOSPORINE 0.5 MG/ML
1 EMULSION OPHTHALMIC EVERY 12 HOURS SCHEDULED
Status: DISCONTINUED | OUTPATIENT
Start: 2022-01-03 | End: 2022-01-06 | Stop reason: HOSPADM

## 2022-01-03 RX ORDER — SODIUM CHLORIDE 9 MG/ML
INJECTION, SOLUTION INTRAVENOUS AS NEEDED
Status: DISCONTINUED | OUTPATIENT
Start: 2022-01-03 | End: 2022-01-03 | Stop reason: HOSPADM

## 2022-01-03 RX ORDER — DEXAMETHASONE SODIUM PHOSPHATE 4 MG/ML
INJECTION, SOLUTION INTRA-ARTICULAR; INTRALESIONAL; INTRAMUSCULAR; INTRAVENOUS; SOFT TISSUE AS NEEDED
Status: DISCONTINUED | OUTPATIENT
Start: 2022-01-03 | End: 2022-01-03 | Stop reason: SURG

## 2022-01-03 RX ORDER — SODIUM CHLORIDE, SODIUM LACTATE, POTASSIUM CHLORIDE, CALCIUM CHLORIDE 600; 310; 30; 20 MG/100ML; MG/100ML; MG/100ML; MG/100ML
INJECTION, SOLUTION INTRAVENOUS CONTINUOUS PRN
Status: DISCONTINUED | OUTPATIENT
Start: 2022-01-03 | End: 2022-01-03 | Stop reason: SURG

## 2022-01-03 RX ORDER — LATANOPROST 50 UG/ML
1 SOLUTION/ DROPS OPHTHALMIC NIGHTLY
Status: DISCONTINUED | OUTPATIENT
Start: 2022-01-03 | End: 2022-01-06 | Stop reason: HOSPADM

## 2022-01-03 RX ORDER — ONDANSETRON 2 MG/ML
INJECTION INTRAMUSCULAR; INTRAVENOUS AS NEEDED
Status: DISCONTINUED | OUTPATIENT
Start: 2022-01-03 | End: 2022-01-03 | Stop reason: SURG

## 2022-01-03 RX ORDER — FENTANYL CITRATE 50 UG/ML
INJECTION, SOLUTION INTRAMUSCULAR; INTRAVENOUS
Status: COMPLETED
Start: 2022-01-03 | End: 2022-01-03

## 2022-01-03 RX ORDER — PROMETHAZINE HYDROCHLORIDE 12.5 MG/1
12.5 SUPPOSITORY RECTAL EVERY 6 HOURS PRN
Status: DISCONTINUED | OUTPATIENT
Start: 2022-01-03 | End: 2022-01-06 | Stop reason: HOSPADM

## 2022-01-03 RX ORDER — ROCURONIUM BROMIDE 10 MG/ML
INJECTION, SOLUTION INTRAVENOUS AS NEEDED
Status: DISCONTINUED | OUTPATIENT
Start: 2022-01-03 | End: 2022-01-03 | Stop reason: SURG

## 2022-01-03 RX ORDER — LIDOCAINE HYDROCHLORIDE 10 MG/ML
INJECTION, SOLUTION EPIDURAL; INFILTRATION; INTRACAUDAL; PERINEURAL AS NEEDED
Status: DISCONTINUED | OUTPATIENT
Start: 2022-01-03 | End: 2022-01-03 | Stop reason: SURG

## 2022-01-03 RX ORDER — MAGNESIUM HYDROXIDE 1200 MG/15ML
LIQUID ORAL AS NEEDED
Status: DISCONTINUED | OUTPATIENT
Start: 2022-01-03 | End: 2022-01-03 | Stop reason: HOSPADM

## 2022-01-03 RX ORDER — ASPIRIN 81 MG/1
81 TABLET ORAL DAILY
Status: DISCONTINUED | OUTPATIENT
Start: 2022-01-04 | End: 2022-01-06 | Stop reason: HOSPADM

## 2022-01-03 RX ADMIN — FENTANYL CITRATE 50 MCG: 50 INJECTION, SOLUTION INTRAMUSCULAR; INTRAVENOUS at 16:03

## 2022-01-03 RX ADMIN — SODIUM CHLORIDE: 9 INJECTION, SOLUTION INTRAVENOUS at 15:59

## 2022-01-03 RX ADMIN — CEFAZOLIN SODIUM 2 G: 2 INJECTION, SOLUTION INTRAVENOUS at 16:03

## 2022-01-03 RX ADMIN — ACETAMINOPHEN 650 MG: 325 TABLET, FILM COATED ORAL at 20:57

## 2022-01-03 RX ADMIN — PHENYLEPHRINE HYDROCHLORIDE 0.3 MCG/KG/MIN: 10 INJECTION INTRAVENOUS at 17:19

## 2022-01-03 RX ADMIN — SODIUM CHLORIDE, POTASSIUM CHLORIDE, SODIUM LACTATE AND CALCIUM CHLORIDE: 600; 310; 30; 20 INJECTION, SOLUTION INTRAVENOUS at 18:36

## 2022-01-03 RX ADMIN — ROCURONIUM BROMIDE 50 MG: 10 INJECTION INTRAVENOUS at 16:03

## 2022-01-03 RX ADMIN — ONDANSETRON 4 MG: 2 INJECTION INTRAMUSCULAR; INTRAVENOUS at 18:37

## 2022-01-03 RX ADMIN — FENTANYL CITRATE 50 MCG: 50 INJECTION, SOLUTION INTRAMUSCULAR; INTRAVENOUS at 16:26

## 2022-01-03 RX ADMIN — HYDROMORPHONE HYDROCHLORIDE 0.5 MG: 1 INJECTION, SOLUTION INTRAMUSCULAR; INTRAVENOUS; SUBCUTANEOUS at 19:23

## 2022-01-03 RX ADMIN — FENTANYL CITRATE 50 MCG: 50 INJECTION INTRAMUSCULAR; INTRAVENOUS at 19:44

## 2022-01-03 RX ADMIN — ROCURONIUM BROMIDE 20 MG: 10 INJECTION INTRAVENOUS at 16:58

## 2022-01-03 RX ADMIN — FAMOTIDINE 20 MG: 20 TABLET ORAL at 14:14

## 2022-01-03 RX ADMIN — PROPOFOL 200 MG: 10 INJECTION, EMULSION INTRAVENOUS at 16:03

## 2022-01-03 RX ADMIN — SODIUM CHLORIDE: 9 INJECTION, SOLUTION INTRAVENOUS at 16:58

## 2022-01-03 RX ADMIN — FENTANYL CITRATE 50 MCG: 50 INJECTION INTRAMUSCULAR; INTRAVENOUS at 19:26

## 2022-01-03 RX ADMIN — ROSUVASTATIN CALCIUM 40 MG: 20 TABLET, FILM COATED ORAL at 20:57

## 2022-01-03 RX ADMIN — LIDOCAINE HYDROCHLORIDE 0.5 ML: 10 INJECTION, SOLUTION EPIDURAL; INFILTRATION; INTRACAUDAL; PERINEURAL at 13:52

## 2022-01-03 RX ADMIN — LIDOCAINE HYDROCHLORIDE 50 MG: 10 INJECTION, SOLUTION EPIDURAL; INFILTRATION; INTRACAUDAL; PERINEURAL at 16:03

## 2022-01-03 RX ADMIN — SODIUM CHLORIDE 100 ML/HR: 9 INJECTION, SOLUTION INTRAVENOUS at 13:52

## 2022-01-03 RX ADMIN — Medication 100 MCG: at 17:16

## 2022-01-03 RX ADMIN — IBUPROFEN 600 MG: 600 TABLET, FILM COATED ORAL at 20:56

## 2022-01-03 RX ADMIN — SUGAMMADEX 200 MG: 100 INJECTION, SOLUTION INTRAVENOUS at 18:50

## 2022-01-03 RX ADMIN — Medication 100 MCG: at 17:01

## 2022-01-03 RX ADMIN — LATANOPROST 1 DROP: 50 SOLUTION OPHTHALMIC at 21:22

## 2022-01-03 RX ADMIN — Medication 100 MCG: at 16:50

## 2022-01-03 RX ADMIN — DEXAMETHASONE SODIUM PHOSPHATE 4 MG: 4 INJECTION, SOLUTION INTRA-ARTICULAR; INTRALESIONAL; INTRAMUSCULAR; INTRAVENOUS; SOFT TISSUE at 16:10

## 2022-01-03 RX ADMIN — CYCLOSPORINE 1 DROP: 0.5 EMULSION OPHTHALMIC at 21:22

## 2022-01-03 RX ADMIN — SODIUM CHLORIDE, POTASSIUM CHLORIDE, SODIUM LACTATE AND CALCIUM CHLORIDE 90 ML/HR: 600; 310; 30; 20 INJECTION, SOLUTION INTRAVENOUS at 20:32

## 2022-01-03 RX ADMIN — ROCURONIUM BROMIDE 10 MG: 10 INJECTION INTRAVENOUS at 17:29

## 2022-01-03 RX ADMIN — OXYCODONE HYDROCHLORIDE AND ACETAMINOPHEN 1 TABLET: 7.5; 325 TABLET ORAL at 22:42

## 2022-01-03 RX ADMIN — Medication 100 MCG: at 16:38

## 2022-01-03 NOTE — ANESTHESIA PROCEDURE NOTES
Airway  Urgency: elective    Date/Time: 1/3/2022 4:05 PM  Airway not difficult    General Information and Staff    Patient location during procedure: OR  CRNA: Marcelo Oseguera CRNA    Indications and Patient Condition  Indications for airway management: airway protection    Preoxygenated: yes  MILS not maintained throughout  Mask difficulty assessment: 1 - vent by mask    Final Airway Details  Final airway type: endotracheal airway      Successful airway: ETT  Cuffed: yes   Successful intubation technique: video laryngoscopy  Endotracheal tube insertion site: oral  Blade: Zamorano  Blade size: 3  ETT size (mm): 7.0  Cormack-Lehane Classification: grade I - full view of glottis  Placement verified by: chest auscultation and capnometry   Measured from: lips  ETT/EBT  to lips (cm): 20  Number of attempts at approach: 1  Assessment: lips, teeth, and gum same as pre-op and atraumatic intubation    Additional Comments  Negative epigastric sounds, Breath sound equal bilaterally with symmetric chest rise and fall             Stefanie Cardenas is a 61 y.o. female.     Chief Complaint   Patient presents with   • Abdominal Pain     radiating to back    • Vomiting       History obtained from the patient.      Abdominal Pain   This is a new problem. The current episode started yesterday. The onset quality is gradual. The problem occurs intermittently. The problem has been gradually improving. The pain is located in the RUQ. The pain is at a severity of 8/10. The quality of the pain is sharp. The abdominal pain radiates to the back. Associated symptoms include belching, diarrhea, headaches (from lack of caffeine), nausea and vomiting. Pertinent negatives include no arthralgias, constipation, dysuria, fever, flatus, frequency, hematochezia, hematuria, melena or myalgias. Nothing aggravates the pain. The pain is relieved by vomiting (and raising arms above the head). She has tried nothing for the symptoms. Prior workup: None. Her past medical history is significant for abdominal surgery (h/o uterine ablation) and GERD (on Pantoprazole). There is no history of Crohn's disease, irritable bowel syndrome or ulcerative colitis.   Vomiting    This is a new problem. Episode onset: 3-4 days ago. The problem has been gradually improving. The emesis has an appearance of stomach contents. There has been no fever. Associated symptoms include abdominal pain, chills, diarrhea and headaches (from lack of caffeine). Pertinent negatives include no arthralgias, chest pain, coughing, dizziness, fever or myalgias. Risk factors: None. She has tried nothing for the symptoms.      Of note, the patient's meals just prior to the onset of these symptoms included pizza and sliders.  These do not food she typically eats, but they were babysitting grandchildren for the weekend.    The patient has a history of GERD, stable on Protonix.      The following portions of the patient's history were reviewed and updated as appropriate: allergies, current  medications, past family history, past medical history, past social history, past surgical history and problem list.      Review of Systems   Constitutional: Positive for appetite change (decreased), chills and unexpected weight change (down 3 pounds). Negative for fever.   HENT: Negative for congestion, rhinorrhea and sore throat.    Respiratory: Negative for cough, shortness of breath and wheezing.    Cardiovascular: Negative for chest pain.   Gastrointestinal: Positive for abdominal pain, diarrhea, nausea and vomiting. Negative for blood in stool, constipation, flatus, hematochezia and melena.   Genitourinary: Negative for dysuria, frequency, hematuria and urgency.   Musculoskeletal: Positive for neck pain. Negative for arthralgias, joint swelling, myalgias and neck stiffness.   Skin: Negative for rash.   Neurological: Positive for headaches (from lack of caffeine). Negative for dizziness and light-headedness.   Hematological: Negative for adenopathy.           Objective     Blood pressure 128/70, pulse 80, temperature 97.2 °F (36.2 °C), temperature source Temporal, resp. rate 20, weight 63 kg (139 lb), not currently breastfeeding.    Physical Exam   Constitutional:   Overweight.   HENT:   Head: Normocephalic and atraumatic.   Right Ear: Tympanic membrane, external ear and ear canal normal.   Left Ear: Tympanic membrane, external ear and ear canal normal.   Mouth/Throat: Oropharynx is clear and moist and mucous membranes are normal. No oral lesions.   Tonsils absent.  No sinus tenderness to palpation.   Eyes: Conjunctivae are normal.   Neck: Normal range of motion. Neck supple.   Cardiovascular: Normal rate and regular rhythm.   No murmur heard.  Pulmonary/Chest: Effort normal and breath sounds normal.   Abdominal: Soft. Bowel sounds are normal. She exhibits no distension and no mass. There is no hepatosplenomegaly. There is tenderness (mild diffusely, moderate in epigastric area). There is no rebound, no  guarding and no CVA tenderness.   Lymphadenopathy:     She has no cervical adenopathy.   Skin: No rash noted.   Psychiatric: She has a normal mood and affect.   Nursing note and vitals reviewed.        Assessment/Plan   Helga was seen today for abdominal pain and vomiting.    Diagnoses and all orders for this visit:    Right upper quadrant abdominal pain  -     US Abdomen Limited; Future  -     Lipase  -     Comprehensive Metabolic Panel  -     CBC & Differential  -     CBC Auto Differential    Non-intractable vomiting with nausea, unspecified vomiting type  -     US Abdomen Limited; Future  -     Lipase  -     Comprehensive Metabolic Panel  -     CBC & Differential  -     ondansetron ODT (ZOFRAN ODT) 8 MG disintegrating tablet; Take 1 tablet by mouth Every 8 (Eight) Hours As Needed for Nausea or Vomiting.  -     CBC Auto Differential     Recommended clear liquids and bland foods.  Advance diet as tolerated.  Avoid fatty, greasy, or creamy foods.   May increase Pantoprazole to twice daily short term.      Return if symptoms worsen or fail to improve.

## 2022-01-03 NOTE — OP NOTE
NEUROSURGICAL OPERATIVE NOTE        PREOPERATIVE DIAGNOSIS:    1.  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  Left L2-3 disc herniation with recess and foraminal stenosis      POSTOPERATIVE DIAGNOSIS:  Same      PROCEDURE:  1.  Arthrodesis interbody type L3-4  2.  L3-4 laminectomy with partial facetectomies and foraminotomies  3.  Bilateral L3-4 discectomy with bilateral Capstone Cage placement  4.  Nonsegmental pedicle screw fixation from L3 to the prior L4-5 construct utilizing PEEK rods  5.  Left L2-3 laminotomy, medial facetectomy, foraminotomy, and discectomy  6.  Use of [place] and local autograft  7.  Stealth frameless stereotaxy utilized in conjunction with O arm imaging      SURGEON:  Yoni Borja M.D.      ASSISTANT: Wendy Cuello PA-C    PAC assisted with:   Suctioning   Retraction   Tying   Suturing   Closing   Application of dressing   Skilled neurosurgery PA assistance was necessary to perform this procedure.        ANESTHESIA:  General      ESTIMATED BLOOD LOSS: 300 mL      SPECIMEN: None      DRAINS: Hemovac      COMPLICATIONS:  None      CLINICAL NOTE:  The patient is a 68-year-old woman who a couple of years ago underwent L4-5 decompression with fusion stabilization.  She has developed progressive low back pain with walking and standing intolerance.  She also has severe pain involving the left thigh.  Studies demonstrate generous stenosis above her prior construct.  She also has a disc protrusion into the recess on the left at L2-3.  As such she presents for additional decompression at L2-3 and L3-4 with fusion and stabilization extension to the L3-4 level from the prior construct.  The nature of the procedure as well as the potential risks, complications, limitations, and alternatives to the procedure were discussed at length with the patient and the patient has agreed to proceed with surgery.      TECHNICAL NOTE:  The patient was brought to the operating room and while  on her cart general endotracheal anesthesia was achieved. She was then turned prone onto the Ryan table. Special care was ensured to protect pressure points. Her low back was prepared and draped in the usual fashion. Incision was opened and extended upward. Previous hardware was exposed.  Set screws were backed out and the previous PEEK rods were removed. Reference frame was affixed to the spinous process. O-arm imaging ensued. These images were downloaded into the [employer]. Using Stealth frameless stereotaxy each of the pedicle screw hole sites at L3 bilaterally were marked, drilled and tapped; 6.5 mm diameter screws were used bilaterally.  This was a 50 mm length screw on the left and a 55 mm length screw on the right.  [place] rongeur was then utilized to remove the spinous process at L3 and the upper aspect of L4. The drill was utilized to fashion a central trough, which was widened.  Partial facetectomies were performed bilaterally at L3-4.  Some granulation tissue was taken down on the right from her prior intervention.  The L3-L4 nerve roots were exposed and decompressed.  I then worked leftward at L2-3 where a laminotomy was fashioned with drill and [place] punches.  The medial aspect of the facet was taken down similarly.  The nerve root and dural sac were retracted medially over a very firm subligamentous disk extrusion.  Disk was incised and some of this material was resected using pituitaries and rongeurs.  The disk space itself was very collapsed and could not be entered.  However, this allowed for decompression of the L3 nerve root.  Bleeding points were controlled with bipolar cautery.  The C-arm was brought into use and beginning on the right at L3-4, the disk was incised and evacuated piecemeal with an array of rongeurs and punches. Distraction was performed across the disk space using the distractors and set screws on the pedicle screws.  After preparing the disk space, ultimately a 9 x  22 mm Capstone cage packed with a combination of [place] and local autograft was impacted into the disk space.  A similar procedure was performed on the contralateral side.  Before placing the 2nd 9 x 22 mm cage, some of the fusion substrate was placed anteriorly and in the midline. The 2nd cage was placed. PEEK rods measuring 70 mm were then used to bridge the L3, L4 and L5 pedicle screws.  Set screws were applied, tightened and broken off per routine.  The wound was washed out with a saline solution.  Some Gelfoam was left in the gutters. A Hemovac drain was brought in through a separate stab incision and left in the epidural space. Vancomycin powder was sprinkled in the depths and then more superficially as the wound was closed. The paraspinous muscle and fascia were reapproximated in an interrupted fashion with 0 Vicryl suture; 0.25% Marcaine was instilled into the paraspinous musculature and subcutaneous tissues. The subcutaneous tissues were closed in layers with 2-0 followed by 3-0 Vicryl suture. The skin was closed in a running subcuticular fashion with 3-0 Vicryl suture. Steri-Strips and a sterile dressing were applied. She was subsequently rolled onto her cart, extubated and taken to the recovery room in satisfactory condition.             Yoni Borja M.D.

## 2022-01-03 NOTE — H&P
Pre-Op H&P  Eneiad Bartholomew  [id number]  1950      Chief complaint: Back pain      Subjective:  Patient is a 71 y.o.female presents for scheduled surgery by Dr. Borja. She anticipates a LUMBAR FUSION extension DECOMPRESSON WITH PEDICLE SCREWS L3-4, with exploration L2-3, left, possible discectomy today. She has chronic back pain that has exacerbated over the last 2 years. On 5/2/2019 she underwent right L3-4 foraminotomies as well as PLIF at L4-5. The pain radiates down BLE; L>R. Denies saddle anesthesia. Symptoms are pronounced when standing and walking. She has been unable to effectively complete her physical therapy due to standing intolerance.       Review of Systems:  Constitutional-- No fever, chills or sweats. No fatigue.  CV-- No chest pain, palpitation or syncope. +HTN, HLD CAD +cardiac clearance  Resp-- No SOB, cough, hemoptysis  Skin--No rashes or lesions      Allergies:   Allergies   Allergen Reactions   • Prednisone Shortness Of Breath     And nervous   • Shellfish-Derived Products Anaphylaxis, Shortness Of Breath, Swelling and Rash     ALL SEAFOOD, FISH   • Erythromycin Rash   • Sulfa Antibiotics Rash         Home Meds:  Medications Prior to Admission   Medication Sig Dispense Refill Last Dose   • amLODIPine (NORVASC) 10 MG tablet Take 10 mg by mouth Daily.      • aspirin 81 MG EC tablet Take 81 mg by mouth Daily.      • baclofen (LIORESAL) 10 MG tablet 10 mg 3 (Three) Times a Day As Needed.      • benazepril (LOTENSIN) 40 MG tablet Take 40 mg by mouth Daily.      • hydroCHLOROthiazide (HYDRODIURIL) 25 MG tablet Take 25 mg by mouth Daily.      • ibuprofen (ADVIL,MOTRIN) 800 MG tablet Take 800 mg by mouth As Needed for Mild Pain .      • Latanoprost 0.005 % emulsion Apply 1 drop to eye(s) as directed by provider Every Night.      • metoprolol succinate XL (TOPROL-XL) 100 MG 24 hr tablet 100 mg Daily.      • Multiple Vitamins-Minerals (MULTIVITAL PO) Take 1 tablet by mouth Daily.      •  mupirocin (Bactroban) 2 % ointment Apply to the inside of each nostril with a cotton swab bid, morning and evening 5 days before surgery. 22 g 0    • omeprazole (priLOSEC) 20 MG capsule 20 mg Daily.      • oxybutynin XL (DITROPAN-XL) 10 MG 24 hr tablet Take 10 mg by mouth Daily.      • Restasis 0.05 % ophthalmic emulsion Administer 1 drop to both eyes Every 12 (Twelve) Hours.      • rosuvastatin (CRESTOR) 40 MG tablet Take 40 mg by mouth Every Night.      • vitamin E 400 UNIT capsule Take 400 Units by mouth Daily.            PMH:   Past Medical History:   Diagnosis Date   • Arthritis    • Arthritis    • Bronchitis    • Chicken pox    • Coronary artery disease    • Fibromyalgia    • GERD (gastroesophageal reflux disease)    • Glaucoma    • Glaucoma    • History of cardiac cath    • History of echocardiogram    • History of Holter monitoring    • History of migraine    • History of PTCA    • History of stress test    • Hyperlipidemia    • Hypertension    • Measles    • Stomach ulcer    • UTI (urinary tract infection)      PSH:    Past Surgical History:   Procedure Laterality Date   • BREAST BIOPSY Right    • BUNIONECTOMY Bilateral     x4   • CATARACT EXTRACTION EXTRACAPSULAR W/ INTRAOCULAR LENS IMPLANTATION Bilateral    • CHOLECYSTECTOMY     • CORONARY ANGIOPLASTY WITH STENT PLACEMENT     • EYE SURGERY Bilateral     CATARACTS REMOVED   • KNEE ARTHROSCOPY Right 2021   • LUMBAR LAMINECTOMY WITH FUSION N/A 5/2/2019    Procedure: LUMBAR FUSION DECOMPRESSON WITH PEDICLE SCREWS L4-5, LAMINECTOMY, FORAMINOTOMY RIGHT L3-4;  Surgeon: Yoni Borja MD;  Location: [facility];  Service: Neurosurgery       Immunization History:  Influenza: 2021  Pneumococcal: UTD  Tetanus: UTD  Covid : No    Social History:   Tobacco:   Social History     Tobacco Use   Smoking Status Never Smoker   Smokeless Tobacco Never Used      Alcohol:     Social History     Substance and Sexual Activity   Alcohol Use No         Physical Exam: VS: BP  140/77  HR 89  RR 16  T 97.9  Sat 96%RA      General Appearance:    Alert, cooperative, no distress, appears stated age   Head:    Normocephalic, without obvious abnormality, atraumatic   Lungs:     Clear to auscultation bilaterally, respirations unlabored    Heart:   Regular rate and rhythm, S1 and S2 normal    Abdomen:    Soft without tenderness   Extremities:   Extremities normal, atraumatic, no cyanosis or edema   Skin:   Skin color, texture, turgor normal, no rashes or lesions   Neurologic:   Grossly intact     Results Review:     LABS:  Lab Results   Component Value Date    WBC 5.95 12/30/2021    HGB 14.0 12/30/2021    HCT 40.3 12/30/2021    MCV 85.2 12/30/2021     12/30/2021    NEUTROABS 2.84 12/30/2021    GLUCOSE 101 (H) 12/30/2021    BUN 10 12/30/2021    CREATININE 0.71 12/30/2021    EGFRIFNONA 81 12/30/2021     (L) 12/30/2021    K 3.5 12/30/2021    CL 97 (L) 12/30/2021    CO2 25.0 12/30/2021    CALCIUM 9.7 12/30/2021    ALBUMIN 4.70 12/30/2021    AST 24 12/30/2021    ALT 19 12/30/2021    BILITOT 0.5 12/30/2021       RADIOLOGY:  MRI of lumbar spine demonstrates postoperative fusion changes at L4-5.  At that level at L3-4 there is bilateral foraminal stenosis.  At L2-3 there is leftward disc osteophyte protrusion narrowing the recess.    I reviewed the patient's new clinical results.    Cancer Staging (if applicable)  Cancer Patient: __ yes __no __unknown; If yes, clinical stage T:__ N:__M:__, stage group or __N/A      Impression: Lumbar stenosis with neurogenic claudication       Plan: LUMBAR FUSION extension DECOMPRESSON WITH PEDICLE SCREWS L3-4, with exploration L2-3, left, possible discectomy      Zoe Gustavo, APRN   1/3/2022   13:58 EST

## 2022-01-03 NOTE — ANESTHESIA PREPROCEDURE EVALUATION
Anesthesia Evaluation                  Airway   Mallampati: II  Dental      Pulmonary    Cardiovascular     (+) hypertension,       Neuro/Psych  GI/Hepatic/Renal/Endo    (+)  GERD,      Musculoskeletal     Abdominal    Substance History      OB/GYN          Other                        Anesthesia Plan    ASA 3     general     intravenous induction     Anesthetic plan, all risks, benefits, and alternatives have been provided, discussed and informed consent has been obtained with: patient.

## 2022-01-04 LAB
HCT VFR BLD AUTO: 33.2 % (ref 34–46.6)
HGB BLD-MCNC: 11 G/DL (ref 12–15.9)

## 2022-01-04 PROCEDURE — 0 CEFAZOLIN IN DEXTROSE 2-4 GM/100ML-% SOLUTION: Performed by: NEUROLOGICAL SURGERY

## 2022-01-04 PROCEDURE — 99024 POSTOP FOLLOW-UP VISIT: CPT | Performed by: NEUROLOGICAL SURGERY

## 2022-01-04 PROCEDURE — A9270 NON-COVERED ITEM OR SERVICE: HCPCS | Performed by: NEUROLOGICAL SURGERY

## 2022-01-04 PROCEDURE — 63710000001 DIPHENHYDRAMINE PER 50 MG: Performed by: NEUROLOGICAL SURGERY

## 2022-01-04 PROCEDURE — 97162 PT EVAL MOD COMPLEX 30 MIN: CPT

## 2022-01-04 PROCEDURE — 85014 HEMATOCRIT: CPT | Performed by: NEUROLOGICAL SURGERY

## 2022-01-04 PROCEDURE — 97165 OT EVAL LOW COMPLEX 30 MIN: CPT

## 2022-01-04 PROCEDURE — 63710000001 OXYCODONE-ACETAMINOPHEN 7.5-325 MG TABLET: Performed by: NEUROLOGICAL SURGERY

## 2022-01-04 PROCEDURE — 85018 HEMOGLOBIN: CPT | Performed by: NEUROLOGICAL SURGERY

## 2022-01-04 RX ADMIN — IBUPROFEN 600 MG: 600 TABLET, FILM COATED ORAL at 20:13

## 2022-01-04 RX ADMIN — AMLODIPINE BESYLATE 10 MG: 10 TABLET ORAL at 09:31

## 2022-01-04 RX ADMIN — OXYCODONE HYDROCHLORIDE AND ACETAMINOPHEN 1 TABLET: 7.5; 325 TABLET ORAL at 04:26

## 2022-01-04 RX ADMIN — HYDROCHLOROTHIAZIDE 25 MG: 25 TABLET ORAL at 09:31

## 2022-01-04 RX ADMIN — OXYCODONE HYDROCHLORIDE AND ACETAMINOPHEN 1 TABLET: 7.5; 325 TABLET ORAL at 09:43

## 2022-01-04 RX ADMIN — ASPIRIN 81 MG: 81 TABLET, COATED ORAL at 09:31

## 2022-01-04 RX ADMIN — LISINOPRIL 40 MG: 40 TABLET ORAL at 09:30

## 2022-01-04 RX ADMIN — CYCLOSPORINE 1 DROP: 0.5 EMULSION OPHTHALMIC at 09:32

## 2022-01-04 RX ADMIN — DIPHENHYDRAMINE HYDROCHLORIDE 25 MG: 25 CAPSULE ORAL at 01:00

## 2022-01-04 RX ADMIN — ACETAMINOPHEN 650 MG: 325 TABLET, FILM COATED ORAL at 20:13

## 2022-01-04 RX ADMIN — SODIUM CHLORIDE, PRESERVATIVE FREE 10 ML: 5 INJECTION INTRAVENOUS at 20:14

## 2022-01-04 RX ADMIN — CEFAZOLIN SODIUM 2 G: 2 INJECTION, SOLUTION INTRAVENOUS at 00:30

## 2022-01-04 RX ADMIN — ACETAMINOPHEN 325 MG: 325 TABLET, FILM COATED ORAL at 16:14

## 2022-01-04 RX ADMIN — ACETAMINOPHEN 325 MG: 325 TABLET, FILM COATED ORAL at 09:31

## 2022-01-04 RX ADMIN — PANTOPRAZOLE SODIUM 40 MG: 40 TABLET, DELAYED RELEASE ORAL at 09:30

## 2022-01-04 RX ADMIN — DIPHENHYDRAMINE HYDROCHLORIDE 25 MG: 25 CAPSULE ORAL at 20:13

## 2022-01-04 RX ADMIN — CEFAZOLIN SODIUM 2 G: 2 INJECTION, SOLUTION INTRAVENOUS at 09:43

## 2022-01-04 RX ADMIN — METOPROLOL SUCCINATE 100 MG: 100 TABLET, EXTENDED RELEASE ORAL at 09:32

## 2022-01-04 RX ADMIN — OXYCODONE HYDROCHLORIDE AND ACETAMINOPHEN 1 TABLET: 7.5; 325 TABLET ORAL at 16:14

## 2022-01-04 RX ADMIN — LATANOPROST 1 DROP: 50 SOLUTION OPHTHALMIC at 20:14

## 2022-01-04 RX ADMIN — OXYBUTYNIN CHLORIDE 10 MG: 10 TABLET, EXTENDED RELEASE ORAL at 09:30

## 2022-01-04 RX ADMIN — ROSUVASTATIN CALCIUM 40 MG: 20 TABLET, FILM COATED ORAL at 20:12

## 2022-01-04 NOTE — CASE MANAGEMENT/SOCIAL WORK
Discharge Planning Assessment  [facility]     Patient Name: Eneida Bartholomew  MRN: [id number]  Today's Date: 1/4/2022    Admit Date: 1/3/2022     Discharge Needs Assessment     Row Name 01/04/22 1150       Living Environment    Lives With alone    Current Living Arrangements home/apartment/condo    Primary Care Provided by self    Provides Primary Care For no one    Family Caregiver if Needed child(cachorro), adult; sibling(s)    Quality of Family Relationships helpful; involved; supportive    Able to Return to Prior Arrangements yes       Resource/Environmental Concerns    Resource/Environmental Concerns home accessibility    Home Accessibility Concerns stairs to access bedroom or bathroom  has made arrangements for rental of hospital bed to stay on main floor    Transportation Concerns car, none       Transition Planning    Patient/Family Anticipates Transition to home    Patient/Family Anticipated Services at Transition none    Transportation Anticipated family or friend will provide       Discharge Needs Assessment    Readmission Within the Last 30 Days no previous admission in last 30 days    Equipment Currently Used at Home walker, rolling; commode    Concerns to be Addressed basic needs; discharge planning    Equipment Needed After Discharge hospital bed    Current Discharge Risk physical impairment; lives alone               Discharge Plan     Row Name 01/04/22 1157       Plan    Plan Home    Patient/Family in Agreement with Plan yes    Plan Comments chart reviewed. I met with Ms Morales to initiate d/c planning. Her plan is to return home. She lives alone, but will have help from daughter and her sisters. One of her sister lives next door to her. Prior to admit she was independent with all ADLs, driving,etc. She used a cane prn. she already has a rolling walker and BSC ( to use over toilet as extension seat). She has made arrangements prior to admission to rent a hospital bed from [facility] in  [place]. No d/c needs identfied, please advise if any arise    Final Discharge Disposition Code 01 - home or self-care              Continued Care and Services - Admitted Since 1/3/2022    Coordination has not been started for this encounter.       Expected Discharge Date and Time     Expected Discharge Date Expected Discharge Time    Jan 6, 2022          Demographic Summary     Row Name 01/04/22 1148       General Information    Admission Type inpatient    Arrived From home    Referral Source admission list    Reason for Consult discharge planning    Preferred Language English    General Information Comments PCP- Cari Rodriguez       Contact Information    Permission Granted to Share Info With     Contact Information Comments sakshi Hazel ( daughter) [phone]               Functional Status     Row Name 01/04/22 1148       Functional Status    Usual Activity Tolerance good    Current Activity Tolerance --  see therapy evaluation       Functional Status, IADL    Medications independent    Meal Preparation independent    Housekeeping independent    Laundry independent    Shopping independent       Mental Status    General Appearance WDL WDL       Mental Status Summary    Recent Changes in Mental Status/Cognitive Functioning no changes       Employment/    Employment Status retired    Employment/ Comments insurance- [employer] Medicare replacement               Psychosocial    No documentation.                Abuse/Neglect    No documentation.                Legal    No documentation.                Substance Abuse    No documentation.                Patient Forms    No documentation.                   Sonja C Kellerman, LYNN

## 2022-01-04 NOTE — PLAN OF CARE
Goal Outcome Evaluation:  Plan of Care Reviewed With: patient        Progress: improving  Outcome Summary: Patient justin to ambulate 200 feet in hallway with walker. Her gait is slow with some difficulty sustaining upright posture. She is motivated to get stronger. Recommend continued skilled PT.

## 2022-01-04 NOTE — PLAN OF CARE
Problem: Adult Inpatient Plan of Care  Goal: Plan of Care Review  Recent Flowsheet Documentation  Taken 1/4/2022 0840 by Sonja Shankar OT  Progress: improving  Plan of Care Reviewed With: patient  Outcome Summary: OT IE completed. Pt is Ox4 and motivated to regain her occupational independence. Pt reports significantly improved post-op pain. Education reinforced for spinal precautions, logroll sequencing, and ADL retraining. Transfering with RW support and Min Ax1. Set-up for toileting hygiene, to don shoes, and to complete simple grooming. OT will follow IP. Recommend home with family support when medically ready. No DME needs at this time.

## 2022-01-04 NOTE — ANESTHESIA POSTPROCEDURE EVALUATION
Patient: Eneida Bartholomew    Procedure Summary     Date: 01/03/22 [facility] / [facility]    Anesthesia Start: 1559 Anesthesia Stop:     Procedure: LUMBAR FUSION extension DECOMPRESSON WITH PEDICLE SCREWS L3-4, with exploration L2-3, left, possible discectomy (N/A Spine Lumbar) Diagnosis:       Spinal stenosis, lumbar region, with neurogenic claudication      (Spinal stenosis, lumbar region, with neurogenic claudication [M48.062])    Surgeons: Yoni Borja MD Provider: Vinod Live MD    Anesthesia Type: general ASA Status: 3          Anesthesia Type: general    Vitals  No vitals data found for the desired time range.          Post Anesthesia Care and Evaluation    Patient location during evaluation: PACU  Patient participation: complete - patient participated  Level of consciousness: awake and alert  Pain management: adequate  Airway patency: patent  Anesthetic complications: No anesthetic complications  PONV Status: none  Cardiovascular status: hemodynamically stable and acceptable  Respiratory status: nonlabored ventilation, acceptable and nasal cannula  Hydration status: acceptable

## 2022-01-04 NOTE — PLAN OF CARE
Goal Outcome Evaluation:  Plan of Care Reviewed With: patient A&Ox4. VSS. Pivoting from bed to BSC with stand by assist. Voiding without difficulty. Positive pedal pulses. Hemovac to lumbar spine with drainage noted around insertion site, dressing reinforced. Pain tolerable with PO meds.

## 2022-01-04 NOTE — PLAN OF CARE
Patient alert and oriented x 4. Pain controlled, received 2 prn percocet's this shift. Ambulating with assist x 1 with gaitbelt and walker to bathroom. Bloody drainage to hemovac.

## 2022-01-04 NOTE — THERAPY EVALUATION
Patient Name: Eneida Bartholomew  : 1950    MRN: [id number]                              Today's Date: 2022       Admit Date: 1/3/2022    Visit Dx:     ICD-10-CM ICD-9-CM   1. Spinal stenosis, lumbar region, with neurogenic claudication  M48.062 724.03     Patient Active Problem List   Diagnosis   • Lumbar stenosis with neurogenic claudication   • Coronary artery disease   • Hypertension   • Hyperlipidemia   • Spinal stenosis, lumbar region, with neurogenic claudication     Past Medical History:   Diagnosis Date   • Arthritis    • Arthritis    • Bronchitis    • Chicken pox    • Coronary artery disease    • Fibromyalgia    • GERD (gastroesophageal reflux disease)    • Glaucoma    • Glaucoma    • History of cardiac cath    • History of echocardiogram    • History of Holter monitoring    • History of migraine    • History of PTCA    • History of stress test    • Hyperlipidemia    • Hypertension    • Measles    • Stomach ulcer    • UTI (urinary tract infection)      Past Surgical History:   Procedure Laterality Date   • BREAST BIOPSY Right    • BUNIONECTOMY Bilateral     x4   • CATARACT EXTRACTION EXTRACAPSULAR W/ INTRAOCULAR LENS IMPLANTATION Bilateral    • CHOLECYSTECTOMY     • CORONARY ANGIOPLASTY WITH STENT PLACEMENT     • EYE SURGERY Bilateral     CATARACTS REMOVED   • KNEE ARTHROSCOPY Right    • LUMBAR LAMINECTOMY WITH FUSION N/A 2019    Procedure: LUMBAR FUSION DECOMPRESSON WITH PEDICLE SCREWS L4-5, LAMINECTOMY, FORAMINOTOMY RIGHT L3-4;  Surgeon: Yoni Borja MD;  Location: [facility];  Service: Neurosurgery      General Information     Row Name 22 1111          Physical Therapy Time and Intention    Document Type evaluation  -SC     Mode of Treatment physical therapy  -SC     Row Name 22 1111          General Information    Patient Profile Reviewed yes  -SC     Existing Precautions/Restrictions fall; spinal; other (see comments)  hemovac  -SC     Row Name 22 1111           Cognition    Orientation Status (Cognition) oriented x 4  -SC     Row Name 01/04/22 1111          Safety Issues, Functional Mobility    Impairments Affecting Function (Mobility) balance; endurance/activity tolerance; pain; strength; range of motion (ROM)  -SC     Comment, Safety Issues/Impairments (Mobility) alert, following commands,  -SC           User Key  (r) = Recorded By, (t) = Taken By, (c) = Cosigned By    Initials Name Provider Type    SC Amos Garcias, PT Physical Therapist               Mobility     Row Name 01/04/22 1112          Bed Mobility    Comment (Bed Mobility) uic  -SC     Row Name 01/04/22 1112          Transfers    Comment (Transfers) cues or hand placement with transfers. Demonstrated slow shacky transfers  -SC     Row Name 01/04/22 1112          Sit-Stand Transfer    Sit-Stand [place] (Transfers) minimum assist (75% patient effort); verbal cues; 1 person assist; 1 person to manage equipment  -SC     Assistive Device (Sit-Stand Transfers) walker, front-wheeled  -SC     Row Name 01/04/22 1112          Gait/Stairs (Locomotion)    [place] Level (Gait) verbal cues; 1 person assist; 1 person to manage equipment; contact guard  -SC     Distance in Feet (Gait) 200  -SC     Bilateral Gait Deviations forward flexed posture  -SC     Comment (Gait/Stairs) GT training focused on controling walker. Cues for upright posture and to stay close on turns. Demonstrated flexed posture 50% with difficulty sustaining this. NO LOB noted  -SC           User Key  (r) = Recorded By, (t) = Taken By, (c) = Cosigned By    Initials Name Provider Type    SC Amos Garcias PT Physical Therapist               Obj/Interventions     Row Name 01/04/22 1115          Range of Motion Comprehensive    Comment, General Range of Motion R Foot with some ROM limits in all toes 2 to old surgery. other JTs wfl  -SC     Row Name 01/04/22 1115          Strength Comprehensive (MMT)    Comment, General Manual Muscle Testing  (MMT) Assessment B LE: quads 4+/5, tib ant 4+/5 , EHL L 4/5, R 1/5  -[facility] Name 01/04/22 [facility]          Motor Skills    Motor Skills posture  flexed  -SC     Therapeutic Exercise shoulder; hip; ankle; knee  -[facility] Name 01/04/22 [facility]          Shoulder (Therapeutic Exercise)    Shoulder (Therapeutic Exercise) AROM (active range of motion)  -SC     Shoulder AROM (Therapeutic Exercise) bilateral; flexion; extension; 10 repetitions  -[facility] Name 01/04/22 [facility]          Hip (Therapeutic Exercise)    Hip (Therapeutic Exercise) AROM (active range of motion)  -SC     Hip AROM (Therapeutic Exercise) bilateral; flexion; extension; external rotation; internal rotation; aDduction; aBduction; 10 repetitions; supine; other (see comments)  in chair: bent knee fall outs  -[facility] Name 01/04/22 [facility]          Knee (Therapeutic Exercise)    Knee (Therapeutic Exercise) isometric exercises; AROM (active range of motion)  -SC     Knee AROM (Therapeutic Exercise) bilateral; extension; flexion; sitting; 10 repetitions  -SC     Knee Isometrics (Therapeutic Exercise) bilateral; quad sets; 10 repetitions  -[facility] Name 01/04/22 [facility]          Ankle (Therapeutic Exercise)    Ankle (Therapeutic Exercise) AROM (active range of motion)  -SC     Ankle AROM (Therapeutic Exercise) bilateral; dorsiflexion; plantarflexion; sitting; 10 repetitions  -[facility] Name 01/04/22 [facility]          Balance    Balance Assessment standing dynamic balance  -SC     Dynamic Sitting Balance mild impairment; supported  -SC     Dynamic Standing Balance mild impairment; supported  -SC     Comment, Balance needs walker, and contact guarding, working on upright posture  -[facility] Name 01/04/22 [facility]          Sensory Assessment (Somatosensory)    Sensory Assessment (Somatosensory) sensation intact  -SC           User Key  (r) = Recorded By, (t) = Taken By, (c) = Cosigned By    Initials Name Provider Type    SC Amos Garcias PT Physical Therapist                Goals/Plan     Row Name 01/04/22 1130          Bed Mobility Goal 1 (PT)    Activity/Assistive Device (Bed Mobility Goal 1, PT) bed mobility activities, all  -SC     [place] Level/Cues Needed (Bed Mobility Goal 1, PT) independent  -SC     Time Frame (Bed Mobility Goal 1, PT) long term goal (LTG); 10 days  -SC     Row Name 01/04/22 1130          Transfer Goal 1 (PT)    Activity/Assistive Device (Transfer Goal 1, PT) sit-to-stand/stand-to-sit; bed-to-chair/chair-to-bed; walker, rolling  -SC     [place] Level/Cues Needed (Transfer Goal 1, PT) modified independence  -SC     Time Frame (Transfer Goal 1, PT) long term goal (LTG); 10 days  -SC     Row Name 01/04/22 1130          Gait Training Goal 1 (PT)    Activity/Assistive Device (Gait Training Goal 1, PT) gait (walking locomotion); walker, rolling  -SC     [place] Level (Gait Training Goal 1, PT) modified independence  -SC     Distance (Gait Training Goal 1, PT) 400  -SC     Time Frame (Gait Training Goal 1, PT) long term goal (LTG); 10 days  -SC     Row Name 01/04/22 1130          Stairs Goal 1 (PT)    Activity/Assistive Device (Stairs Goal 1, PT) stairs, all skills  -SC     [place] Level/Cues Needed (Stairs Goal 1, PT) contact guard assist; 1 person assist  -SC     Number of Stairs (Stairs Goal 1, PT) 2  -SC     Time Frame (Stairs Goal 1, PT) long term goal (LTG); 10 days  -SC     Row Name 01/04/22 1130          Patient Education Goal (PT)    Activity (Patient Education Goal, PT) back precautions  -SC     [place]/Cues/Accuracy (Memory Goal 2, PT) demonstrates adequately; independent; verbalizes understanding  -SC     Time Frame (Patient Education Goal, PT) long term goal (LTG); 10 days  -SC           User Key  (r) = Recorded By, (t) = Taken By, (c) = Cosigned By    Initials Name Provider Type    SC Amos aGrcias, PT Physical Therapist               Clinical Impression     Row Name 01/04/22 1121          Pain    Additional Documentation  Pain Scale: FACES Pre/Post-Treatment (Group)  -[facility] Name 01/04/22 1121          Pain Scale: FACES Pre/Post-Treatment    Pain: FACES Scale, Pretreatment 2-->hurts little bit  -SC     Posttreatment Pain Rating 4-->hurts little more  -SC     Pain Location back  -SC     Pre/Posttreatment Pain Comment no leg pain  -[facility] Name 01/04/22 1121          Plan of Care Review    Plan of Care Reviewed With patient  -SC     Progress improving  -SC     Outcome Summary Patient justin to ambulate 200 feet in hallway with walker. Her gait is slow with some difficulty sustaining upright posture. She is motivated to get stronger. Recommend continued skilled PT.  -[facility] Name 01/04/22 1121          Therapy Assessment/Plan (PT)    Rehab Potential (PT) good, to achieve stated therapy goals  -SC     Criteria for Skilled Interventions Met (PT) yes; meets criteria  -[facility] Name 01/04/22 1121          Vital Signs    Post Systolic BP Rehab 134  -SC     Post Treatment Diastolic BP 75  -SC     Posttreatment Heart Rate (beats/min) 95  -SC     Post SpO2 (%) 98  -SC     O2 Delivery Post Treatment supplemental O2  -[facility] Name 01/04/22 1121          Positioning and Restraints    Pre-Treatment Position sitting in chair/recliner  -SC     Post Treatment Position chair  -SC     In Chair notified nsg; reclined; sitting; call light within reach; encouraged to call for assist; exit alarm on  -SC           User Key  (r) = Recorded By, (t) = Taken By, (c) = Cosigned By    Initials Name Provider Type    SC Amos Garcias, PT Physical Therapist               Outcome Measures     [facility] Name 01/04/22 1132          How much help from another person do you currently need...    Turning from your back to your side while in flat bed without using bedrails? 3  -SC     Moving from lying on back to sitting on the side of a flat bed without bedrails? 3  -SC     Moving to and from a bed to a chair (including a wheelchair)? 3  -SC     Standing up from a  chair using your arms (e.g., wheelchair, bedside chair)? 3  -SC     Climbing 3-5 steps with a railing? 3  -SC     To walk in hospital room? 3  -SC     AM-PAC 6 Clicks Score (PT) 18  -SC     Row Name 01/04/22 1132 01/04/22 0848       Functional Assessment    Outcome Measure Options AM-PAC 6 Clicks Basic Mobility (PT)  -SC AM-PAC 6 Clicks Daily Activity (OT)  -          User Key  (r) = Recorded By, (t) = Taken By, (c) = Cosigned By    Initials Name Provider Type    SC Amos Garcias, PT Physical Therapist    TB Sonja Shankar, OT Occupational Therapist                             Physical Therapy Education                 Title: PT OT SLP Therapies (Done)     Topic: Physical Therapy (Done)     Point: Mobility training (Done)     Learning Progress Summary           Patient Eager, E, VU,DU,NR by SC at 1/4/2022 1133    Comment: reviewed HEP and back precautions                   Point: Home exercise program (Done)     Learning Progress Summary           Patient Eager, E, VU,DU,NR by SC at 1/4/2022 1133    Comment: reviewed HEP and back precautions                   Point: Body mechanics (Done)     Learning Progress Summary           Patient Eager, E, VU,DU,NR by SC at 1/4/2022 1133    Comment: reviewed HEP and back precautions                   Point: Precautions (Done)     Learning Progress Summary           Patient Eager, E, VU,DU,NR by SC at 1/4/2022 1133    Comment: reviewed HEP and back precautions                               User Key     Initials Effective Dates Name Provider Type [facility] 06/16/21 -  Amos Garcias PT Physical Therapist PT              PT Recommendation and Plan  Planned Therapy Interventions (PT): bed mobility training, gait training, home exercise program, strengthening, stair training, postural re-education, patient/family education, transfer training  Plan of Care Reviewed With: patient  Progress: improving  Outcome Summary: Patient justin to ambulate 200 feet in hallway  with walker. Her gait is slow with some difficulty sustaining upright posture. She is motivated to get stronger. Recommend continued skilled PT.     Time Calculation:    PT Charges     Row Name 01/04/22 1000             Time Calculation    Start Time 1000  -SC      PT Received On 01/04/22  -SC      PT Goal Re-Cert Due Date 01/14/22  -SC              Time Calculation- PT    Total Timed Code Minutes- PT 20 minute(s)  -SC              Timed Charges    36999 - PT Therapeutic Exercise Minutes 10  -SC      23211 - Gait Training Minutes  10  -SC              Untimed Charges    PT Eval/Re-eval Minutes 45  -SC              Total Minutes    Timed Charges Total Minutes 20  -SC      Untimed Charges Total Minutes 45  -SC       Total Minutes 65  -SC            User Key  (r) = Recorded By, (t) = Taken By, (c) = Cosigned By    Initials Name Provider Type    SC Amos Garcias PT Physical Therapist              Therapy Charges for Today     Code Description Service Date Service Provider Modifiers Qty    [id number] HC PT EVAL MOD COMPLEXITY 3 1/4/2022 Amos Garcias PT GP 1          PT G-Codes  Outcome Measure Options: AM-PAC 6 Clicks Basic Mobility (PT)  AM-PAC 6 Clicks Score (PT): 18  AM-PAC 6 Clicks Score (OT): 19    Amos Garcias PT  1/4/2022

## 2022-01-04 NOTE — THERAPY EVALUATION
Patient Name: Eneida Bartholomew  : 1950    MRN: [id number]                              Today's Date: 2022       Admit Date: 1/3/2022    Visit Dx:     ICD-10-CM ICD-9-CM   1. Spinal stenosis, lumbar region, with neurogenic claudication  M48.062 724.03     Patient Active Problem List   Diagnosis   • Lumbar stenosis with neurogenic claudication   • Coronary artery disease   • Hypertension   • Hyperlipidemia   • Spinal stenosis, lumbar region, with neurogenic claudication     Past Medical History:   Diagnosis Date   • Arthritis    • Arthritis    • Bronchitis    • Chicken pox    • Coronary artery disease    • Fibromyalgia    • GERD (gastroesophageal reflux disease)    • Glaucoma    • Glaucoma    • History of cardiac cath    • History of echocardiogram    • History of Holter monitoring    • History of migraine    • History of PTCA    • History of stress test    • Hyperlipidemia    • Hypertension    • Measles    • Stomach ulcer    • UTI (urinary tract infection)      Past Surgical History:   Procedure Laterality Date   • BREAST BIOPSY Right    • BUNIONECTOMY Bilateral     x4   • CATARACT EXTRACTION EXTRACAPSULAR W/ INTRAOCULAR LENS IMPLANTATION Bilateral    • CHOLECYSTECTOMY     • CORONARY ANGIOPLASTY WITH STENT PLACEMENT     • EYE SURGERY Bilateral     CATARACTS REMOVED   • KNEE ARTHROSCOPY Right    • LUMBAR LAMINECTOMY WITH FUSION N/A 2019    Procedure: LUMBAR FUSION DECOMPRESSON WITH PEDICLE SCREWS L4-5, LAMINECTOMY, FORAMINOTOMY RIGHT L3-4;  Surgeon: Yoni Borja MD;  Location: [facility];  Service: Neurosurgery      General Information     Row Name 22 0830          OT Time and Intention    Document Type evaluation  -TB     Mode of Treatment occupational therapy; individual therapy  -TB     Row Name 22          General Information    Patient Profile Reviewed yes  -TB     Prior Level of Function min assist:; all household mobility; transfer; ADL's  Increased pain and effort  all activities  -TB     Existing Precautions/Restrictions fall; spinal; other (see comments)  Hemovac drain  -TB     Barriers to Rehab previous functional deficit  -TB     Row Name 01/04/22 0830          Occupational Profile    Reason for Services/Referral (Occupational Profile) to advance occupational independence  -TB     Environmental Supports and Barriers (Occupational Profile) Supportive family. Pt able to remain on 1st floor. Hospital bed. 1/2 bath - pt plans to sponge bath until confident to go up/down stairs. Pt has all necessary DME and AE at home.  -TB     Patient Goals (Occupational Profile) to return home when medically ready  -TB     Row Name 01/04/22 0830          Living Environment    Lives With other (see comments); grandchild(cachorro)  Adult grandson lives in the home. Pt's sister lives next door.  -TB     Row Name 01/04/22 0830          Stairs Within Home, Primary    Stairs, Within Home, Primary Flight to master bed and bathroom. Pt has a hospital bed and plans to remain on 1st floor at d/c.  -TB     Row Name 01/04/22 0830          Cognition    Orientation Status (Cognition) oriented x 4  -TB     Row Name 01/04/22 0830          Safety Issues, Functional Mobility    Safety Issues Affecting Function (Mobility) safety precaution awareness; safety precautions follow-through/compliance; awareness of need for assistance  -TB     Impairments Affecting Function (Mobility) balance; endurance/activity tolerance; pain; strength; range of motion (ROM)  -TB     Comment, Safety Issues/Impairments (Mobility) Pt up with Min Ax1  -TB     Row Name 01/04/22 0830          Relationship/Environment    Name(s) of Who Lives With Patient Adult grandson  -TB           User Key  (r) = Recorded By, (t) = Taken By, (c) = Cosigned By    Initials Name Provider Type    TB Sonja Shankar OT Occupational Therapist                 Mobility/ADL's     Row Name 01/04/22 0834          Bed Mobility    Bed Mobility rolling right;  sidelying-sit; scooting/bridging  -TB     Rolling Right [place] (Bed Mobility) contact guard; verbal cues  -TB     Scooting/Bridging [place] (Bed Mobility) standby assist; verbal cues  -TB     Sidelying-Sit [place] (Bed Mobility) contact guard; verbal cues  -TB     Assistive Device (Bed Mobility) bed rails  -TB     Comment (Bed Mobility) Education initiated for spinal precautions and logroll sequencing. Good effort.  -TB     Row Name 01/04/22 0834          Transfers    Transfers sit-stand transfer; toilet transfer; bed-chair transfer  -TB     Comment (Transfers) Education and cues for hand placement, sequencing, and safety.  -TB     Bed-Chair [place] (Transfers) minimum assist (75% patient effort); verbal cues  -TB     Assistive Device (Bed-Chair Transfers) walker, front-wheeled  -TB     Sit-Stand [place] (Transfers) minimum assist (75% patient effort); verbal cues  -TB     [place] Level (Toilet Transfer) minimum assist (75% patient effort); verbal cues  -TB     Assistive Device (Toilet Transfer) commode, bedside without drop arms; walker, front-wheeled  -TB     Row Name 01/04/22 0834          Sit-Stand Transfer    Assistive Device (Sit-Stand Transfers) walker, front-wheeled  -TB     Row Name 01/04/22 0834          Toilet Transfer    Type (Toilet Transfer) sit-stand; stand-sit  -TB     Row Name 01/04/22 0834          Functional Mobility    Functional Mobility- Comment Defer to PT  -TB     Row Name 01/04/22 0834          Activities of Daily Living    BADL Assessment/Intervention bathing; lower body dressing; grooming; toileting  -TB     Row Name 01/04/22 0834          Bathing Assessment/Intervention    [place] Level (Bathing) set up; distal lower extremities/feet  simulated activity  -TB     Position (Bathing) edge of bed sitting  -TB     Comment (Bathing) Education/demonstration reinforced for spinal precautions with ADLs and use of AE to maintain.  -TB     Row Name 01/04/22 0834           Lower Body Dressing Assessment/Training    [place] Level (Lower Body Dressing) don; shoes/slippers; set up  -TB     Position (Lower Body Dressing) edge of bed sitting  -TB     Comment (Lower Body Dressing) Education/demonstration reinforced for spinal precautions with ADLs and use of AE to maintain. Pt has AE at home from remote spinal surgery.  -TB     Row Name 01/04/22 0834          Grooming Assessment/Training    [place] Level (Grooming) set up; wash face, hands; hair care, combing/brushing  -TB     Position (Grooming) supported sitting  -TB     Row Name 01/04/22 0834          Toileting Assessment/Training    [place] Level (Toileting) set up; perform perineal hygiene  -TB     Position (Toileting) unsupported sitting  -TB           User Key  (r) = Recorded By, (t) = Taken By, (c) = Cosigned By    Initials Name Provider Type    TB Sonja Shankar, OT Occupational Therapist               Obj/Interventions     Row Name 01/04/22 0839          Sensory Assessment (Somatosensory)    Sensory Assessment (Somatosensory) UE sensation intact  -TB     Row Name 01/04/22 0839          Vision Assessment/Intervention    Visual Impairment/Limitations corrective lenses for reading  -TB     Row Name 01/04/22 0839          Range of Motion Comprehensive    General Range of Motion bilateral upper extremity ROM WFL  -TB     Comment, General Range of Motion BUE AROM WFL for ADLs  -TB     Row Name 01/04/22 0839          Strength Comprehensive (MMT)    Comment, General Manual Muscle Testing (MMT) Assessment Generalized weakness/deconditioned. BUE WFL for AD use and ADL performance  -TB     Row Name 01/04/22 0839          Balance    Balance Assessment sitting dynamic balance; sit to stand dynamic balance; standing dynamic balance  -TB     Dynamic Sitting Balance WFL; unsupported; sitting in chair; sitting, edge of bed  BSC  -TB     Sit to Stand Dynamic Balance mild impairment; supported; standing  -TB      Dynamic Standing Balance mild impairment; supported; standing  -TB     Balance Interventions sitting; standing; sit to stand; supported; dynamic; occupation based/functional task; UE activity with balance activity; dynamic reaching; weight shifting activity  -TB     Comment, Balance Pt up with RW support and Min Ax1  -TB           User Key  (r) = Recorded By, (t) = Taken By, (c) = Cosigned By    Initials Name Provider Type    TB Sonja Shankar, OT Occupational Therapist               Goals/Plan     Row Name 01/04/22 0846          Bed Mobility Goal 1 (OT)    Activity/Assistive Device (Bed Mobility Goal 1, OT) sidelying to sit/sit to sidelying  -TB     [place] Level/Cues Needed (Bed Mobility Goal 1, OT) supervision required; verbal cues required  -TB     Time Frame (Bed Mobility Goal 1, OT) by discharge  -TB     Strategies/Barriers (Bed Mobility Goal 1, OT) Spinal precautions  -TB     Row Name 01/04/22 0846          Transfer Goal 1 (OT)    Activity/Assistive Device (Transfer Goal 1, OT) toilet  -TB     [place] Level/Cues Needed (Transfer Goal 1, OT) supervision required; verbal cues required  -TB     Time Frame (Transfer Goal 1, OT) by discharge  -TB     Strategies/Barriers (Transfers Goal 1, OT) Spinal precautions. Progress to bathroom for transfers to support household distances.  -TB     Progress/Outcome (Transfer Goal 1, OT) goal ongoing  -TB     Row Name 01/04/22 0846          Dressing Goal 1 (OT)    Activity/Device (Dressing Goal 1, OT) lower body dressing  -TB     [place]/Cues Needed (Dressing Goal 1, OT) minimum assist (75% or more patient effort); verbal cues required  -TB     Time Frame (Dressing Goal 1, OT) by discharge  -TB     Strategies/Barriers (Dressing Goal 1, OT) Spinal precautions  -TB     Progress/Outcome (Dressing Goal 1, OT) goal ongoing  -TB           User Key  (r) = Recorded By, (t) = Taken By, (c) = Cosigned By    Initials Name Provider Type    TB Sonja Shankar,  "OT Occupational Therapist               Clinical Impression     Row Name 01/04/22 0840          Pain Assessment    Additional Documentation Pain Scale: FACES Pre/Post-Treatment (Group)  -TB     Row Name 01/04/22 0840          Pain Scale: Numbers Pre/Post-Treatment    Pain Intervention(s) Ambulation/increased activity; Repositioned  -TB     Row Name 01/04/22 0840          Pain Scale: FACES Pre/Post-Treatment    Pain: FACES Scale, Pretreatment 4-->hurts little more  -TB     Posttreatment Pain Rating 4-->hurts little more  -TB     Pain Location - Orientation incisional  -TB     Pain Location back  -TB     Pre/Posttreatment Pain Comment Pt reports feeling better than she did before surgery. Participates with good effort. \"I am thrilled that the pain is gone from my legs.\"  -TB     Row Name 01/04/22 0840          Plan of Care Review    Plan of Care Reviewed With patient  -TB     Progress improving  -TB     Outcome Summary OT IE completed. Pt is Ox4 and motivated to regain her occupational independence. Pt reports significantly improved post-op pain. Education reinforced for spinal precautions, logroll sequencing, and ADL retraining. Transfering with RW support and Min Ax1. Set-up for toileting hygiene, to don shoes, and to complete simple grooming. OT will follow IP. Recommend home with family support when medically ready. No DME needs at this time.  -TB     Row Name 01/04/22 0840          Therapy Assessment/Plan (OT)    Rehab Potential (OT) good, to achieve stated therapy goals  -TB     Criteria for Skilled Therapeutic Interventions Met (OT) yes; skilled treatment is necessary  -TB     Therapy Frequency (OT) daily  -TB     Row Name 01/04/22 0840          Therapy Plan Review/Discharge Plan (OT)    Equipment Needs Upon Discharge (OT) --  None  -TB     Anticipated Discharge Disposition (OT) home with assist  -TB     Row Name 01/04/22 0840          Vital Signs    Pre Systolic BP Rehab --  RN cleared OT  -TB     Pre SpO2 (%) " 98  -TB     O2 Delivery Pre Treatment room air  -TB     O2 Delivery Intra Treatment room air  -TB     Post SpO2 (%) 97  -TB     O2 Delivery Post Treatment room air  -TB     Pre Patient Position Supine  -TB     Intra Patient Position Standing  -TB     Post Patient Position Sitting  -TB     Row Name 01/04/22 0840          Positioning and Restraints    Pre-Treatment Position in bed  -TB     Post Treatment Position chair  -TB     In Chair notified nsg; reclined; call light within reach; encouraged to call for assist; waffle cushion; legs elevated  -TB           User Key  (r) = Recorded By, (t) = Taken By, (c) = Cosigned By    Initials Name Provider Type    Sonja Rogel OT Occupational Therapist               Outcome Measures     Row Name 01/04/22 0848          How much help from another is currently needed...    Putting on and taking off regular lower body clothing? 3  -TB     Bathing (including washing, rinsing, and drying) 3  -TB     Toileting (which includes using toilet bed pan or urinal) 3  -TB     Putting on and taking off regular upper body clothing 3  -TB     Taking care of personal grooming (such as brushing teeth) 3  -TB     Eating meals 4  -TB     AM-PAC 6 Clicks Score (OT) 19  -TB     Row Name 01/04/22 0848          Functional Assessment    Outcome Measure Options AM-PAC 6 Clicks Daily Activity (OT)  -TB           User Key  (r) = Recorded By, (t) = Taken By, (c) = Cosigned By    Initials Name Provider Type    Sonja Rogel OT Occupational Therapist                Occupational Therapy Education                 Title: PT OT SLP Therapies (Done)     Topic: Occupational Therapy (Done)     Point: ADL training (Done)     Description:   Instruct learner(s) on proper safety adaptation and remediation techniques during self care or transfers.   Instruct in proper use of assistive devices.              Learning Progress Summary           Patient Acceptance, E,D, VU,DU,NR by  at 1/4/2022  0848                   Point: Precautions (Done)     Description:   Instruct learner(s) on prescribed precautions during self-care and functional transfers.              Learning Progress Summary           Patient Acceptance, E,D, VU,DU,NR by  at 1/4/2022 0848                               User Key     Initials Effective Dates Name Provider Type Discipline     06/16/21 -  Sonja Shankar OT Occupational Therapist OT              OT Recommendation and Plan  Therapy Frequency (OT): daily  Plan of Care Review  Plan of Care Reviewed With: patient  Progress: improving  Outcome Summary: OT IE completed. Pt is Ox4 and motivated to regain her occupational independence. Pt reports significantly improved post-op pain. Education reinforced for spinal precautions, logroll sequencing, and ADL retraining. Transfering with RW support and Min Ax1. Set-up for toileting hygiene, to don shoes, and to complete simple grooming. OT will follow IP. Recommend home with family support when medically ready. No DME needs at this time.     Time Calculation:    Time Calculation- OT     Row Name 01/04/22 0746             Time Calculation- OT    OT Start Time 0746  -TB      OT Received On 01/04/22  -TB      OT Goal Re-Cert Due Date 01/14/22  -TB              Untimed Charges    OT Eval/Re-eval Minutes 65  -TB              Total Minutes    Untimed Charges Total Minutes 65  -TB       Total Minutes 65  -TB            User Key  (r) = Recorded By, (t) = Taken By, (c) = Cosigned By    Initials Name Provider Type     Sonja Shankar OT Occupational Therapist              Therapy Charges for Today     Code Description Service Date Service Provider Modifiers Qty    [id number] HC OT EVAL LOW COMPLEXITY 4 1/4/2022 Sonja Shankar OT GO 1               Sonja Shankar OT  1/4/2022

## 2022-01-04 NOTE — PROGRESS NOTES
"NEUROSURGERY PROGRESS NOTE     LOS: 0 days   Patient Care Team:  Cari Rodriguez MD as PCP - General (Internal Medicine)  Cari Rodriguez MD as Referring Physician (Internal Medicine)    Chief Complaint: Low back and left thigh pain with walking and standing intolerance.    POD#: 1 Day Post-Op  Procedures:  L3-4 PLIF extension.  Left L2-3 foraminotomy and discectomy.    Interval History:   The patient has ambulated and is voiding independently.  Patient Complaints: Incisional pain.  Patient Denies: Preoperative claudication symptoms and left thigh pain.    Vital Signs: Blood pressure 132/83, pulse 95, temperature 97.7 °F (36.5 °C), temperature source Oral, resp. rate 16, height 160 cm (63\"), weight 79.8 kg (176 lb), SpO2 98 %, not currently breastfeeding.  Intake/Output:     Intake/Output Summary (Last 24 hours) at 1/4/2022 0627  Last data filed at 1/4/2022 0400  Gross per 24 hour   Intake 2480 ml   Output 1075 ml   Net 1405 ml     Drain output: 25 mL.    Physical Exam:  The patient is in good spirits.  Motor function is intact in her lower extremities.  Dry dressing is in place on her incision.     Data Review:    H&H pending.    Assessment/Plan:  1.  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  2.  Left L2-3 recess and foraminal stenosis with disc herniation status post decompression.  3.  History of hypertension.  4.  History of coronary artery disease.  5.  Disposition: Mobilize patient.  I anticipate that she will be discharged home in a couple of days.      Yoni Borja MD  01/04/22  06:27 EST    "

## 2022-01-05 PROCEDURE — 97110 THERAPEUTIC EXERCISES: CPT

## 2022-01-05 PROCEDURE — 99024 POSTOP FOLLOW-UP VISIT: CPT | Performed by: NEUROLOGICAL SURGERY

## 2022-01-05 PROCEDURE — 97530 THERAPEUTIC ACTIVITIES: CPT

## 2022-01-05 PROCEDURE — 97116 GAIT TRAINING THERAPY: CPT

## 2022-01-05 RX ORDER — BACLOFEN 10 MG/1
10 TABLET ORAL EVERY 8 HOURS SCHEDULED
Status: DISCONTINUED | OUTPATIENT
Start: 2022-01-05 | End: 2022-01-06 | Stop reason: HOSPADM

## 2022-01-05 RX ADMIN — IBUPROFEN 600 MG: 600 TABLET, FILM COATED ORAL at 21:24

## 2022-01-05 RX ADMIN — OXYCODONE HYDROCHLORIDE AND ACETAMINOPHEN 1 TABLET: 7.5; 325 TABLET ORAL at 10:03

## 2022-01-05 RX ADMIN — LISINOPRIL 40 MG: 40 TABLET ORAL at 08:41

## 2022-01-05 RX ADMIN — ASPIRIN 81 MG: 81 TABLET, COATED ORAL at 08:41

## 2022-01-05 RX ADMIN — PANTOPRAZOLE SODIUM 40 MG: 40 TABLET, DELAYED RELEASE ORAL at 08:41

## 2022-01-05 RX ADMIN — IBUPROFEN 600 MG: 600 TABLET, FILM COATED ORAL at 15:53

## 2022-01-05 RX ADMIN — ACETAMINOPHEN 650 MG: 325 TABLET, FILM COATED ORAL at 21:24

## 2022-01-05 RX ADMIN — SODIUM CHLORIDE, PRESERVATIVE FREE 10 ML: 5 INJECTION INTRAVENOUS at 08:42

## 2022-01-05 RX ADMIN — CYCLOSPORINE 1 DROP: 0.5 EMULSION OPHTHALMIC at 21:24

## 2022-01-05 RX ADMIN — BACLOFEN 10 MG: 10 TABLET ORAL at 13:17

## 2022-01-05 RX ADMIN — LATANOPROST 1 DROP: 50 SOLUTION OPHTHALMIC at 21:00

## 2022-01-05 RX ADMIN — ACETAMINOPHEN 650 MG: 325 TABLET, FILM COATED ORAL at 15:53

## 2022-01-05 RX ADMIN — ACETAMINOPHEN 650 MG: 325 TABLET, FILM COATED ORAL at 08:42

## 2022-01-05 RX ADMIN — OXYBUTYNIN CHLORIDE 10 MG: 10 TABLET, EXTENDED RELEASE ORAL at 08:41

## 2022-01-05 RX ADMIN — AMLODIPINE BESYLATE 10 MG: 10 TABLET ORAL at 08:42

## 2022-01-05 RX ADMIN — BACLOFEN 10 MG: 10 TABLET ORAL at 21:24

## 2022-01-05 RX ADMIN — METOPROLOL SUCCINATE 100 MG: 100 TABLET, EXTENDED RELEASE ORAL at 08:42

## 2022-01-05 RX ADMIN — IBUPROFEN 600 MG: 600 TABLET, FILM COATED ORAL at 08:41

## 2022-01-05 RX ADMIN — ROSUVASTATIN CALCIUM 40 MG: 20 TABLET, FILM COATED ORAL at 21:24

## 2022-01-05 RX ADMIN — HYDROCHLOROTHIAZIDE 25 MG: 25 TABLET ORAL at 08:41

## 2022-01-05 NOTE — CASE MANAGEMENT/SOCIAL WORK
Continued Stay Note  [facility]     Patient Name: Eneida Bartholomew  MRN: [id number]  Today's Date: 1/5/2022    Admit Date: 1/3/2022     Discharge Plan     Row Name 01/05/22 1610       Plan    Plan Home with family assist and [facility]    Plan Comments CM spoke with patient at bedside. Patient states she would like [facility] for physical therapy as she has had them in the past. CM called and spoke with Marylou with [facility] who will follow up with patient tomorrow. Patient's sister's and daughter will be able to assist patient at home. Family to provide transportation to home tomorrow. CM following.    Final Discharge Disposition Code 01 - home or self-care               Discharge Codes    No documentation.               Expected Discharge Date and Time     Expected Discharge Date Expected Discharge Time    Jan 6, 2022             Karyn Olmstead RN

## 2022-01-05 NOTE — PLAN OF CARE
Problem: Adult Inpatient Plan of Care  Goal: Plan of Care Review  Outcome: Ongoing, Progressing  Flowsheets  Taken 1/5/2022 1848 by Vilma Mcmahon RN  Plan of Care Reviewed With: patient  Taken 1/5/2022 1532 by Sonja Shankar, ERIN  Progress: improving  Outcome Summary: Pt is motivated to work with therapy. Recalls 3/3 spinal precautions and maintains precautions without assist. Supervision for logroll sequencing. CGA STS. CGA, progressing to SBA for in room and hallway ambulation using RW. Pt reports improved pain and demonstrates good safety awareness. Independent for LB dressing task. OT will continue to follow IP. Plan is home with family support when medically ready.   Goal Outcome Evaluation:  Plan of Care Reviewed With: patient

## 2022-01-05 NOTE — PLAN OF CARE
Problem: Adult Inpatient Plan of Care  Goal: Plan of Care Review  Flowsheets (Taken 1/5/2022 1372)  Progress: improving  Plan of Care Reviewed With: patient  Outcome Summary: Pt increased ambulation distance to 300 feet using RW and CGA. Gait limited by fatigue and pain. STS performed with CGA. Reviewed HEP and spinal precautions. Will continue to progress strength and mobility as able.   Goal Outcome Evaluation:  Plan of Care Reviewed With: patient        Progress: improving  Outcome Summary: Pt increased ambulation distance to 300 feet using RW and CGA. Gait limited by fatigue and pain. STS performed with CGA. Reviewed HEP and spinal precautions. Will continue to progress strength and mobility as able.

## 2022-01-05 NOTE — PLAN OF CARE
Goal Outcome Evaluation:  Plan of Care Reviewed With: Cristela&ANDRE. VSS. Dressing to lumbar spine changed, wound well approximated with steristrips in place, boarder guaze placed over site. Hemovac in place with 100 mls of blood tinged  drainage. Pain controlled with PO meds. Ambulating to and from BR with standby assist.

## 2022-01-05 NOTE — THERAPY TREATMENT NOTE
Patient Name: Eneida Bartholomew  : 1950    MRN: [id number]                              Today's Date: 2022       Admit Date: 1/3/2022    Visit Dx:     ICD-10-CM ICD-9-CM   1. Spinal stenosis, lumbar region, with neurogenic claudication  M48.062 724.03     Patient Active Problem List   Diagnosis   • Lumbar stenosis with neurogenic claudication   • Coronary artery disease   • Hypertension   • Hyperlipidemia   • Spinal stenosis, lumbar region, with neurogenic claudication     Past Medical History:   Diagnosis Date   • Arthritis    • Arthritis    • Bronchitis    • Chicken pox    • Coronary artery disease    • Fibromyalgia    • GERD (gastroesophageal reflux disease)    • Glaucoma    • Glaucoma    • History of cardiac cath    • History of echocardiogram    • History of Holter monitoring    • History of migraine    • History of PTCA    • History of stress test    • Hyperlipidemia    • Hypertension    • Measles    • Stomach ulcer    • UTI (urinary tract infection)      Past Surgical History:   Procedure Laterality Date   • BREAST BIOPSY Right    • BUNIONECTOMY Bilateral     x4   • CATARACT EXTRACTION EXTRACAPSULAR W/ INTRAOCULAR LENS IMPLANTATION Bilateral    • CHOLECYSTECTOMY     • CORONARY ANGIOPLASTY WITH STENT PLACEMENT     • EYE SURGERY Bilateral     CATARACTS REMOVED   • KNEE ARTHROSCOPY Right    • LUMBAR LAMINECTOMY WITH FUSION N/A 2019    Procedure: LUMBAR FUSION DECOMPRESSON WITH PEDICLE SCREWS L4-5, LAMINECTOMY, FORAMINOTOMY RIGHT L3-4;  Surgeon: Yoni Borja MD;  Location: [facility] OR;  Service: Neurosurgery   • LUMBAR LAMINECTOMY WITH FUSION N/A 1/3/2022    Procedure: LUMBAR FUSION extension DECOMPRESSON WITH PEDICLE SCREWS L3-4, with exploration L2-3, left, discectomy;  Surgeon: Yoni Borja MD;  Location: [facility] OR;  Service: Neurosurgery;  Laterality: N/A;      General Information     Row Name 22 0955          Physical Therapy Time and Intention    Document Type therapy  note (daily note)  -     Mode of Treatment physical therapy; individual therapy  -     Row Name 01/05/22 0955          General Information    Existing Precautions/Restrictions fall; spinal; other (see comments)  hemovac  -     Row Name 01/05/22 0955          Cognition    Orientation Status (Cognition) oriented x 4  -     Row Name 01/05/22 0955          Safety Issues, Functional Mobility    Safety Issues Affecting Function (Mobility) safety precaution awareness; awareness of need for assistance; safety precautions follow-through/compliance  -     Impairments Affecting Function (Mobility) balance; endurance/activity tolerance; pain; strength; range of motion (ROM)  -           User Key  (r) = Recorded By, (t) = Taken By, (c) = Cosigned By    Initials Name Provider Type    Jayme Sandoval, PT Physical Therapist               Mobility     Row Name 01/05/22 0955          Bed Mobility    Comment (Bed Mobility) Received [facility]  -     Row Name 01/05/22 0955          Transfers    Comment (Transfers) Verbal cues for hand placement during standing/sitting and maintaining spinal precautions  -     Row Name 01/05/22 0955          Sit-Stand Transfer    Sit-Stand [place] (Transfers) verbal cues; contact guard  -WILLIAM     Assistive Device (Sit-Stand Transfers) walker, front-wheeled  -WILLIAM     Row Name 01/05/22 0955          Gait/Stairs (Locomotion)    [place] Level (Gait) verbal cues; contact guard  -WILLIAM     Assistive Device (Gait) walker, front-wheeled  -     Distance in Feet (Gait) 300  -WILLIAM     Deviations/Abnormal Patterns (Gait) bilateral deviations; abigail decreased; gait speed decreased; stride length decreased  -     Bilateral Gait Deviations forward flexed posture  -     [place] Level (Stairs) not tested  -WILLIAM     Comment (Gait/Stairs) Pt ambulated with step through pattern and decreased speed. Verbal cues for maintaining upright posture, body within walker, and increase step length. Gait limited by  fatigue.  -           User Key  (r) = Recorded By, (t) = Taken By, (c) = Cosigned By    Initials Name Provider Type    Jayme Sandoval PT Physical Therapist               Obj/Interventions     Row Name 01/05/22 0955          Motor Skills    Therapeutic Exercise hip; knee; ankle; other (see comments)  ab sets, shoulder flexion, BKFO  -     Row Name 01/05/22 0955          Hip (Therapeutic Exercise)    Hip (Therapeutic Exercise) AROM (active range of motion); isometric exercises  -     Hip AROM (Therapeutic Exercise) bilateral; external rotation; internal rotation; 10 repetitions  -     Hip Isometrics (Therapeutic Exercise) gluteal sets; 10 repetitions  -     Row Name 01/05/22 0955          Knee (Therapeutic Exercise)    Knee (Therapeutic Exercise) isometric exercises; AROM (active range of motion)  -     Knee AROM (Therapeutic Exercise) bilateral; flexion; extension; 10 repetitions  -     Knee Isometrics (Therapeutic Exercise) quad sets; 10 repetitions  -     Row Name 01/05/22 0955          Ankle (Therapeutic Exercise)    Ankle (Therapeutic Exercise) AROM (active range of motion)  -     Ankle AROM (Therapeutic Exercise) bilateral; dorsiflexion; plantarflexion; 10 repetitions  -           User Key  (r) = Recorded By, (t) = Taken By, (c) = Cosigned By    Initials Name Provider Type    Jayme Sandoval PT Physical Therapist               Goals/Plan    No documentation.                Clinical Impression     [facility] Name 01/05/22 0955          Pain    Additional Documentation Pain Scale: Numbers Pre/Post-Treatment (Group)  -WILLIAM     Row Name 01/05/22 0955          Pain Scale: Numbers Pre/Post-Treatment    Pretreatment Pain Rating 8/10  -     Posttreatment Pain Rating 5/10  -     Pain Location - Orientation incisional  -     Pain Location back  -     Pain Intervention(s) Repositioned; Ambulation/increased activity  -     Row Name 01/05/22 0955          Therapy Assessment/Plan (PT)    Rehab Potential  (PT) good, to achieve stated therapy goals  -WILLIAM     Criteria for Skilled Interventions Met (PT) yes; meets criteria; skilled treatment is necessary  -WILLIAM     Row Name 01/05/22 0955          Positioning and Restraints    Pre-Treatment Position sitting in chair/recliner  -     Post Treatment Position chair  -WILLIAM     In Chair notified nsg; reclined; call light within reach; encouraged to call for assist; exit alarm on; legs elevated  -           User Key  (r) = Recorded By, (t) = Taken By, (c) = Cosigned By    Initials Name Provider Type    Jayme Sandoval, PT Physical Therapist               Outcome Measures     Row Name 01/05/22 0955 01/05/22 0800       How much help from another person do you currently need...    Turning from your back to your side while in flat bed without using bedrails? 3  -WILLIAM 3  -MM    Moving from lying on back to sitting on the side of a flat bed without bedrails? 3  -WILLIAM 3  -MM    Moving to and from a bed to a chair (including a wheelchair)? 3  -WILLIAM 3  -MM    Standing up from a chair using your arms (e.g., wheelchair, bedside chair)? 3  -WILLIAM 3  -MM    Climbing 3-5 steps with a railing? 3  -WILLIAM 3  -MM    To walk in hospital room? 3  -WILLIAM 3  -MM    AM-PAC 6 Clicks Score (PT) 18  -WILLIAM 18  -MM    Row Name 01/05/22 0955          Functional Assessment    Outcome Measure Options AM-PAC 6 Clicks Basic Mobility (PT)  -WILLIAM           User Key  (r) = Recorded By, (t) = Taken By, (c) = Cosigned By    Initials Name Provider Type    Vilma Louise RN Registered Nurse    Jayme Sandoval, PT Physical Therapist                             Physical Therapy Education                 Title: PT OT SLP Therapies (Done)     Topic: Physical Therapy (Done)     Point: Mobility training (Done)     Learning Progress Summary           Patient KIYA Franco D, XENA by WILLIAM at 1/5/2022 0955    Comment: Educated on safe sequencing with ambulatory transfers and gait. Reviewed HEP and spinal precautions.    KIYA Longoria VU,CASS,NR by SC at  1/4/2022 1133    Comment: reviewed HEP and back precautions                   Point: Home exercise program (Done)     Learning Progress Summary           Patient Acceptance, E,D, VU by  at 1/5/2022 0955    Comment: Educated on safe sequencing with ambulatory transfers and gait. Reviewed HEP and spinal precautions.    Eager, E, VU,DU,NR by SC at 1/4/2022 1133    Comment: reviewed HEP and back precautions                   Point: Body mechanics (Done)     Learning Progress Summary           Patient Acceptance, E,D, VU by  at 1/5/2022 0955    Comment: Educated on safe sequencing with ambulatory transfers and gait. Reviewed HEP and spinal precautions.    Eager, E, VU,DU,NR by SC at 1/4/2022 1133    Comment: reviewed HEP and back precautions                   Point: Precautions (Done)     Learning Progress Summary           Patient Acceptance, E,D, VU by  at 1/5/2022 0955    Comment: Educated on safe sequencing with ambulatory transfers and gait. Reviewed HEP and spinal precautions.    Eager, E, VU,DU,NR by SC at 1/4/2022 1133    Comment: reviewed HEP and back precautions                               User Key     Initials Effective Dates Name Provider Type Discipline    SC 06/16/21 -  Amos Garcias, PT Physical Therapist PT     06/16/21 -  Jayme Llamas, PT Physical Therapist PT              PT Recommendation and Plan     Plan of Care Reviewed With: patient  Progress: improving  Outcome Summary: Pt increased ambulation distance to 300 feet using RW and CGA. Gait limited by fatigue and pain. STS performed with CGA. Reviewed HEP and spinal precautions. Will continue to progress strength and mobility as able.     Time Calculation:    PT Charges     Row Name 01/05/22 0955             Time Calculation    Start Time 0955  -      PT Received On 01/05/22  -      PT Goal Re-Cert Due Date 01/14/22  -              Time Calculation- PT    Total Timed Code Minutes- PT 23 minute(s)  -              Timed Charges     47747 - PT Therapeutic Exercise Minutes 11  -WILLIAM      05814 - Gait Training Minutes  12  -WILLIAM              Total Minutes    Timed Charges Total Minutes 23  -WILLIAM       Total Minutes 23  -WILLIAM            User Key  (r) = Recorded By, (t) = Taken By, (c) = Cosigned By    Initials Name Provider Type    Jayme Sandoval, PT Physical Therapist              Therapy Charges for Today     Code Description Service Date Service Provider Modifiers Qty    12562005566 HC PT THER PROC EA 15 MIN 1/5/2022 Jayme Llamas, PT GP 1    26488384586 HC GAIT TRAINING EA 15 MIN 1/5/2022 Jayme Llamas, PT GP 1          PT G-Codes  Outcome Measure Options: AM-PAC 6 Clicks Basic Mobility (PT)  AM-PAC 6 Clicks Score (PT): 18  AM-PAC 6 Clicks Score (OT): 19    Jayme Llamas PT  1/5/2022

## 2022-01-05 NOTE — THERAPY TREATMENT NOTE
Patient Name: Eneida Bartholomew  : 1950    MRN: [id number]                              Today's Date: 2022       Admit Date: 1/3/2022    Visit Dx:     ICD-10-CM ICD-9-CM   1. Spinal stenosis, lumbar region, with neurogenic claudication  M48.062 724.03     Patient Active Problem List   Diagnosis   • Lumbar stenosis with neurogenic claudication   • Coronary artery disease   • Hypertension   • Hyperlipidemia   • Spinal stenosis, lumbar region, with neurogenic claudication     Past Medical History:   Diagnosis Date   • Arthritis    • Arthritis    • Bronchitis    • Chicken pox    • Coronary artery disease    • Fibromyalgia    • GERD (gastroesophageal reflux disease)    • Glaucoma    • Glaucoma    • History of cardiac cath    • History of echocardiogram    • History of Holter monitoring    • History of migraine    • History of PTCA    • History of stress test    • Hyperlipidemia    • Hypertension    • Measles    • Stomach ulcer    • UTI (urinary tract infection)      Past Surgical History:   Procedure Laterality Date   • BREAST BIOPSY Right    • BUNIONECTOMY Bilateral     x4   • CATARACT EXTRACTION EXTRACAPSULAR W/ INTRAOCULAR LENS IMPLANTATION Bilateral    • CHOLECYSTECTOMY     • CORONARY ANGIOPLASTY WITH STENT PLACEMENT     • EYE SURGERY Bilateral     CATARACTS REMOVED   • KNEE ARTHROSCOPY Right    • LUMBAR LAMINECTOMY WITH FUSION N/A 2019    Procedure: LUMBAR FUSION DECOMPRESSON WITH PEDICLE SCREWS L4-5, LAMINECTOMY, FORAMINOTOMY RIGHT L3-4;  Surgeon: Yoni Borja MD;  Location: [facility] OR;  Service: Neurosurgery   • LUMBAR LAMINECTOMY WITH FUSION N/A 1/3/2022    Procedure: LUMBAR FUSION extension DECOMPRESSON WITH PEDICLE SCREWS L3-4, with exploration L2-3, left, discectomy;  Surgeon: Yoni Borja MD;  Location: [facility] OR;  Service: Neurosurgery;  Laterality: N/A;      General Information     Row Name 22 1524          OT Time and Intention    Document Type therapy note (daily note)   -TB     Mode of Treatment occupational therapy; individual therapy  -TB     Row Name 01/05/22 1524          General Information    Patient Profile Reviewed yes  -TB     Existing Precautions/Restrictions fall; spinal; other (see comments)  Hemovac d/c'd  -TB     Barriers to Rehab previous functional deficit  -TB     Row Name 01/05/22 1524          Cognition    Orientation Status (Cognition) oriented x 4  -TB     Row Name 01/05/22 1524          Safety Issues, Functional Mobility    Safety Issues Affecting Function (Mobility) safety precaution awareness; safety precautions follow-through/compliance; awareness of need for assistance  -TB     Impairments Affecting Function (Mobility) pain; strength  -TB     Comment, Safety Issues/Impairments (Mobility) Pt up with RW support and CGAx1. Good safety, no LOB  -TB           User Key  (r) = Recorded By, (t) = Taken By, (c) = Cosigned By    Initials Name Provider Type    Sonja Rogel OT Occupational Therapist                 Mobility/ADL's     Row Name 01/05/22 1526          Bed Mobility    Bed Mobility rolling right; rolling left; scooting/bridging; sidelying-sit; sit-sidelying  -TB     Rolling Left [place] (Bed Mobility) verbal cues; supervision  -TB     Rolling Right [place] (Bed Mobility) verbal cues; supervision  -TB     Scooting/Bridging [place] (Bed Mobility) verbal cues; supervision  -TB     Sidelying-Sit [place] (Bed Mobility) verbal cues; supervision  -TB     Sit-Sidelying [place] (Bed Mobility) verbal cues; supervision  -TB     Assistive Device (Bed Mobility) bed rails  -TB     Comment (Bed Mobility) Pt demonstrates good technique for logroll. Maintains spinal precautions without assist.  -TB     Row Name 01/05/22 1526          Transfers    Transfers sit-stand transfer  -TB     Comment (Transfers) Cues for hand placement  -TB     Sit-Stand [place] (Transfers) contact guard; verbal cues  -TB     Row Name 01/05/22 1526           Sit-Stand Transfer    Assistive Device (Sit-Stand Transfers) walker, front-wheeled  -     Row Name 01/05/22 1526          Functional Mobility    Functional Mobility- Ind. Level contact guard assist; standby assist  -TB     Functional Mobility- Device rolling walker  -TB     Functional Mobility- Comment Pt up with RW support and CGAx1, progressing to SBAx1. Good safety, no LOB  -TB     Row Name 01/05/22 1526          Activities of Daily Living    BADL Assessment/Intervention lower body dressing; feeding; upper body dressing  -TB     Row Name 01/05/22 1526          Bathing Assessment/Intervention    Comment (Bathing) Pt recalls 3/3 spinal precautions. Denies need for additional ADL retraining.  -     Row Name 01/05/22 1526          Lower Body Dressing Assessment/Training    [place] Level (Lower Body Dressing) don; shoes/slippers; independent  -TB     Position (Lower Body Dressing) edge of bed sitting  -TB     Comment (Lower Body Dressing) Pt recalls 3/3 spinal precautions. Denies need for additional ADL retraining.  -     Row Name 01/05/22 1526          Self-Feeding Assessment/Training    [place] Level (Feeding) independent; liquids to mouth  -TB     Position (Self-Feeding) sitting up in bed; supported sitting  -TB     Row Name 01/05/22 1526          Upper Body Dressing Assessment/Training    [place] Level (Upper Body Dressing) don; doff; pajama/robe; set up  -TB     Position (Upper Body Dressing) unsupported standing; edge of bed sitting  -TB           User Key  (r) = Recorded By, (t) = Taken By, (c) = Cosigned By    Initials Name Provider Type    Sonja Rogel OT Occupational Therapist               Obj/Interventions     Row Name 01/05/22 1530          Balance    Balance Assessment sitting dynamic balance; sit to stand dynamic balance; standing dynamic balance  -TB     Dynamic Sitting Balance WFL; unsupported; sitting, edge of bed  -TB     Sit to Stand Dynamic Balance WFL;  supported; standing  -TB     Dynamic Standing Balance WFL; standing; supported  -TB     Balance Interventions sitting; standing; sit to stand; supported; dynamic; occupation based/functional task; UE activity with balance activity; dynamic reaching; weight shifting activity  -TB     Comment, Balance Pt up with RW support and CGAx1, progressing to SBAx1. Good safety, no LOB.  -TB           User Key  (r) = Recorded By, (t) = Taken By, (c) = Cosigned By    Initials Name Provider Type    Sonja Rogel OT Occupational Therapist               Goals/Plan     Row Name 01/05/22 1535          Bed Mobility Goal 1 (OT)    Progress/Outcomes (Bed Mobility Goal 1, OT) goal met  -TB     Row Name 01/05/22 1535          Dressing Goal 1 (OT)    Progress/Outcome (Dressing Goal 1, OT) goal met  -TB           User Key  (r) = Recorded By, (t) = Taken By, (c) = Cosigned By    Initials Name Provider Type    Sonja Rogel OT Occupational Therapist               Clinical Impression     Row Name 01/05/22 1532          Pain Assessment    Additional Documentation Pain Scale: Numbers Pre/Post-Treatment (Group)  -TB     Row Name 01/05/22 1532          Pain Scale: Numbers Pre/Post-Treatment    Pretreatment Pain Rating 4/10  -TB     Posttreatment Pain Rating 4/10  -TB     Pain Location - Orientation incisional  -TB     Pain Location back  -TB     Pre/Posttreatment Pain Comment Pt reports feeling better this afternoon.  -TB     Pain Intervention(s) Ambulation/increased activity; Repositioned  -TB     Row Name 01/05/22 1532          Plan of Care Review    Plan of Care Reviewed With patient  -TB     Progress improving  -TB     Outcome Summary Pt is motivated to work with therapy. Recalls 3/3 spinal precautions and maintains precautions without assist. Supervision for logroll sequencing. CGA STS. CGA, progressing to SBA for in room and hallway ambulation using RW. Pt reports improved pain and demonstrates good safety awareness.  Independent for LB dressing task. OT will continue to follow IP. Plan is home with family support when medically ready.  -TB     Row Name 01/05/22 1532          Therapy Plan Review/Discharge Plan (OT)    Anticipated Discharge Disposition (OT) home with assist  -TB     Row Name 01/05/22 1532          Vital Signs    Pre Systolic BP Rehab --  RN cleared OT  -TB     O2 Delivery Pre Treatment room air  -TB     O2 Delivery Intra Treatment room air  -TB     O2 Delivery Post Treatment room air  -TB     Pre Patient Position Supine  -TB     Intra Patient Position Standing  -TB     Post Patient Position Supine  -TB     Row Name 01/05/22 1532          Positioning and Restraints    Pre-Treatment Position in bed  -TB     Post Treatment Position bed  -TB     In Bed notified nsg; fowlers; call light within reach; encouraged to call for assist; exit alarm on  -TB           User Key  (r) = Recorded By, (t) = Taken By, (c) = Cosigned By    Initials Name Provider Type    TB Sonja Shankar, OT Occupational Therapist               Outcome Measures     Row Name 01/05/22 1536          How much help from another is currently needed...    Putting on and taking off regular lower body clothing? 3  -TB     Bathing (including washing, rinsing, and drying) 3  -TB     Toileting (which includes using toilet bed pan or urinal) 3  -TB     Putting on and taking off regular upper body clothing 4  -TB     Taking care of personal grooming (such as brushing teeth) 4  -TB     Eating meals 4  -TB     AM-PAC 6 Clicks Score (OT) 21  -TB     Row Name 01/05/22 0955 01/05/22 0800       How much help from another person do you currently need...    Turning from your back to your side while in flat bed without using bedrails? 3  -WILLIAM 3  -MM    Moving from lying on back to sitting on the side of a flat bed without bedrails? 3  -WILLIAM 3  -MM    Moving to and from a bed to a chair (including a wheelchair)? 3  -WILLIAM 3  -MM    Standing up from a chair using your arms  (e.g., wheelchair, bedside chair)? 3  -WILLIAM 3  -MM    Climbing 3-5 steps with a railing? 3  -WILLIAM 3  -MM    To walk in hospital room? 3  -WILLIAM 3  -MM    AM-PAC 6 Clicks Score (PT) 18  -WILLIAM 18  -MM    Row Name 01/05/22 1536 01/05/22 0955       Functional Assessment    Outcome Measure Options AM-PAC 6 Clicks Daily Activity (OT)  -TB AM-PAC 6 Clicks Basic Mobility (PT)  -WILLIAM          User Key  (r) = Recorded By, (t) = Taken By, (c) = Cosigned By    Initials Name Provider Type     Sonja Shankar, OT Occupational Therapist    Vilma Louise, RN Registered Nurse    Jayme Sandoval, PT Physical Therapist                Occupational Therapy Education                 Title: PT OT SLP Therapies (Done)     Topic: Occupational Therapy (Done)     Point: ADL training (Done)     Description:   Instruct learner(s) on proper safety adaptation and remediation techniques during self care or transfers.   Instruct in proper use of assistive devices.              Learning Progress Summary           Patient Acceptance, E,D, VU,DU by  at 1/5/2022 1537    Acceptance, E,D, VU,DU,NR by  at 1/4/2022 0848                   Point: Precautions (Done)     Description:   Instruct learner(s) on prescribed precautions during self-care and functional transfers.              Learning Progress Summary           Patient Acceptance, E,D, VU,DU by  at 1/5/2022 1537    Acceptance, E,D, VU,DU,NR by  at 1/4/2022 0848                               User Key     Initials Effective Dates Name Provider Type Discipline     06/16/21 -  Sonja Shankar OT Occupational Therapist OT              OT Recommendation and Plan  Therapy Frequency (OT): daily  Plan of Care Review  Plan of Care Reviewed With: patient  Progress: improving  Outcome Summary: Pt is motivated to work with therapy. Recalls 3/3 spinal precautions and maintains precautions without assist. Supervision for logroll sequencing. CGA STS. CGA, progressing to SBA for in room and hallway  ambulation using RW. Pt reports improved pain and demonstrates good safety awareness. Independent for LB dressing task. OT will continue to follow IP. Plan is home with family support when medically ready.     Time Calculation:    Time Calculation- OT     Row Name 01/05/22 1437 01/05/22 0955          Time Calculation- OT    OT Start Time 1437  -TB --     OT Received On 01/05/22  -TB --     OT Goal Re-Cert Due Date 01/14/22  -TB --            Timed Charges    36104 - Gait Training Minutes  -- 12  -WILLIAM     88445 - OT Therapeutic Activity Minutes 23  -TB --            Total Minutes    Timed Charges Total Minutes 23  -TB 12  -WILLIAM      Total Minutes 23  -TB 12  -WILLIAM           User Key  (r) = Recorded By, (t) = Taken By, (c) = Cosigned By    Initials Name Provider Type    TB Sonja Shankar OT Occupational Therapist    WILLIAM Jayme Llamas, PT Physical Therapist              Therapy Charges for Today     Code Description Service Date Service Provider Modifiers Qty    28368594755  OT EVAL LOW COMPLEXITY 4 1/4/2022 Sonja Shankar OT GO 1    [id number]  OT THERAPEUTIC ACT EA 15 MIN 1/5/2022 Sonja Shankar OT GO 2               Sonja Shankar OT  1/5/2022

## 2022-01-05 NOTE — PLAN OF CARE
Problem: Adult Inpatient Plan of Care  Goal: Plan of Care Review  Recent Flowsheet Documentation  Taken 1/5/2022 1532 by Sonja Shankar OT  Progress: improving  Plan of Care Reviewed With: patient  Outcome Summary: Pt is motivated to work with therapy. Recalls 3/3 spinal precautions and maintains precautions without assist. Supervision for logroll sequencing. CGA STS. CGA, progressing to SBA for in room and hallway ambulation using RW. Pt reports improved pain and demonstrates good safety awareness. Independent for LB dressing task. OT will continue to follow IP. Plan is home with family support when medically ready.

## 2022-01-06 ENCOUNTER — TELEPHONE (OUTPATIENT)
Dept: NEUROSURGERY | Facility: CLINIC | Age: 72
End: 2022-01-06

## 2022-01-06 VITALS
DIASTOLIC BLOOD PRESSURE: 87 MMHG | SYSTOLIC BLOOD PRESSURE: 119 MMHG | OXYGEN SATURATION: 96 % | BODY MASS INDEX: 31.18 KG/M2 | WEIGHT: 176 LBS | TEMPERATURE: 97.9 F | RESPIRATION RATE: 20 BRPM | HEIGHT: 63 IN | HEART RATE: 80 BPM

## 2022-01-06 DIAGNOSIS — M48.062 SPINAL STENOSIS, LUMBAR REGION, WITH NEUROGENIC CLAUDICATION: Primary | ICD-10-CM

## 2022-01-06 DIAGNOSIS — Z98.1 HISTORY OF LUMBAR FUSION: ICD-10-CM

## 2022-01-06 PROCEDURE — 97116 GAIT TRAINING THERAPY: CPT

## 2022-01-06 PROCEDURE — 97110 THERAPEUTIC EXERCISES: CPT

## 2022-01-06 PROCEDURE — 99024 POSTOP FOLLOW-UP VISIT: CPT | Performed by: NEUROLOGICAL SURGERY

## 2022-01-06 PROCEDURE — 99024 POSTOP FOLLOW-UP VISIT: CPT | Performed by: PHYSICIAN ASSISTANT

## 2022-01-06 RX ORDER — OXYCODONE HYDROCHLORIDE AND ACETAMINOPHEN 5; 325 MG/1; MG/1
1 TABLET ORAL 3 TIMES DAILY PRN
Qty: 30 TABLET | Refills: 0 | Status: SHIPPED | OUTPATIENT
Start: 2022-01-06 | End: 2022-02-25

## 2022-01-06 RX ADMIN — CYCLOSPORINE 1 DROP: 0.5 EMULSION OPHTHALMIC at 09:07

## 2022-01-06 RX ADMIN — PANTOPRAZOLE SODIUM 40 MG: 40 TABLET, DELAYED RELEASE ORAL at 09:06

## 2022-01-06 RX ADMIN — BACLOFEN 10 MG: 10 TABLET ORAL at 06:26

## 2022-01-06 RX ADMIN — AMLODIPINE BESYLATE 10 MG: 10 TABLET ORAL at 09:07

## 2022-01-06 RX ADMIN — ACETAMINOPHEN 650 MG: 325 TABLET, FILM COATED ORAL at 09:07

## 2022-01-06 RX ADMIN — SODIUM CHLORIDE, PRESERVATIVE FREE 10 ML: 5 INJECTION INTRAVENOUS at 09:07

## 2022-01-06 RX ADMIN — METOPROLOL SUCCINATE 100 MG: 100 TABLET, EXTENDED RELEASE ORAL at 09:07

## 2022-01-06 RX ADMIN — ASPIRIN 81 MG: 81 TABLET, COATED ORAL at 09:06

## 2022-01-06 RX ADMIN — HYDROCHLOROTHIAZIDE 25 MG: 25 TABLET ORAL at 09:06

## 2022-01-06 RX ADMIN — LISINOPRIL 40 MG: 40 TABLET ORAL at 09:08

## 2022-01-06 RX ADMIN — OXYBUTYNIN CHLORIDE 10 MG: 10 TABLET, EXTENDED RELEASE ORAL at 09:06

## 2022-01-06 RX ADMIN — IBUPROFEN 600 MG: 600 TABLET, FILM COATED ORAL at 09:06

## 2022-01-06 NOTE — THERAPY TREATMENT NOTE
Patient Name: Eneida Bartholomew  : 1950    MRN: [id number]                              Today's Date: 2022       Admit Date: 1/3/2022    Visit Dx:     ICD-10-CM ICD-9-CM   1. Lumbar stenosis with neurogenic claudication  M48.062 724.03   2. Spinal stenosis, lumbar region, with neurogenic claudication  M48.062 724.03     Patient Active Problem List   Diagnosis   • Lumbar stenosis with neurogenic claudication   • Coronary artery disease   • Hypertension   • Hyperlipidemia   • Spinal stenosis, lumbar region, with neurogenic claudication     Past Medical History:   Diagnosis Date   • Arthritis    • Arthritis    • Bronchitis    • Chicken pox    • Coronary artery disease    • Fibromyalgia    • GERD (gastroesophageal reflux disease)    • Glaucoma    • Glaucoma    • History of cardiac cath    • History of echocardiogram    • History of Holter monitoring    • History of migraine    • History of PTCA    • History of stress test    • Hyperlipidemia    • Hypertension    • Measles    • Stomach ulcer    • UTI (urinary tract infection)      Past Surgical History:   Procedure Laterality Date   • BREAST BIOPSY Right    • BUNIONECTOMY Bilateral     x4   • CATARACT EXTRACTION EXTRACAPSULAR W/ INTRAOCULAR LENS IMPLANTATION Bilateral    • CHOLECYSTECTOMY     • CORONARY ANGIOPLASTY WITH STENT PLACEMENT     • EYE SURGERY Bilateral     CATARACTS REMOVED   • KNEE ARTHROSCOPY Right    • LUMBAR LAMINECTOMY WITH FUSION N/A 2019    Procedure: LUMBAR FUSION DECOMPRESSON WITH PEDICLE SCREWS L4-5, LAMINECTOMY, FORAMINOTOMY RIGHT L3-4;  Surgeon: Yoni Borja MD;  Location: [facility] OR;  Service: Neurosurgery   • LUMBAR LAMINECTOMY WITH FUSION N/A 1/3/2022    Procedure: LUMBAR FUSION extension DECOMPRESSON WITH PEDICLE SCREWS L3-4, with exploration L2-3, left, discectomy;  Surgeon: Yoni Borja MD;  Location: [facility] OR;  Service: Neurosurgery;  Laterality: N/A;      General Information     Row Name 22 1000           Physical Therapy Time and Intention    Document Type therapy note (daily note)  -     Mode of Treatment individual therapy; physical therapy  -     Row Name 01/06/22 1000          General Information    Existing Precautions/Restrictions fall; spinal  -     Row Name 01/06/22 1000          Cognition    Orientation Status (Cognition) oriented x 4  -     Row Name 01/06/22 1000          Safety Issues, Functional Mobility    Safety Issues Affecting Function (Mobility) safety precaution awareness; safety precautions follow-through/compliance  -     Impairments Affecting Function (Mobility) strength; pain  -           User Key  (r) = Recorded By, (t) = Taken By, (c) = Cosigned By    Initials Name Provider Type     Jayme Llamas, PT Physical Therapist               Mobility     Row Name 01/06/22 1000          Bed Mobility    Bed Mobility rolling right; rolling left; scooting/bridging; sidelying-sit; sit-sidelying  -WILLIAM     Rolling Left [place] (Bed Mobility) verbal cues; supervision  -WILLIAM     Rolling Right [place] (Bed Mobility) verbal cues; supervision  -WILILAM     Scooting/Bridging [place] (Bed Mobility) verbal cues; supervision  -WILLIAM     Sidelying-Sit [place] (Bed Mobility) verbal cues; supervision  -WILLIAM     Sit-Sidelying [place] (Bed Mobility) verbal cues; supervision  -WILLIAM     Assistive Device (Bed Mobility) bed rails  -     Comment (Bed Mobility) Verbal cues for use of logroll technique  -     Row Name 01/06/22 1000          Transfers    Comment (Transfers) Verbal cues for safe hand placement during standing/sitting and maintaining spinal precautions; supervision for toilet transfer  -     Row Name 01/06/22 1000          Sit-Stand Transfer    Sit-Stand [place] (Transfers) supervision; verbal cues  -     Assistive Device (Sit-Stand Transfers) walker, front-wheeled  -     Row Name 01/06/22 1000          Gait/Stairs (Locomotion)    [place] Level (Gait) supervision; verbal  cues  -WILLIAM     Assistive Device (Gait) walker, front-wheeled  -WILLIAM     Distance in Feet (Gait) 380  -WILLIAM     Deviations/Abnormal Patterns (Gait) bilateral deviations; abigail decreased; gait speed decreased; stride length decreased  -WILLIAM     Bilateral Gait Deviations forward flexed posture  -WILLIAM     [place] Level (Stairs) verbal cues; contact guard  -WILLIAM     Assistive Device (Stairs) walker, front-wheeled; cane, straight  -WILLIAM     Handrail Location (Stairs) none  -WILLIAM     Number of Steps (Stairs) 3  1 step using RW, backwards technique, and CGA, and 2 steps using STC on the R, L HHA, and CGA  -WILLIAM     Ascending Technique (Stairs) step-to-step  -WILLIAM     Descending Technique (Stairs) step-to-step  -WILLIAM     Comment (Gait/Stairs) Pt ambulated with step through pattern and improved speed. Verbal cues for maintaining upright posture, body within walker and increase WB through LEs. Pt ascended/descended 1 step using RW, backwards technique, and CGA, and 2 steps using STC on the R, L HHA, and CGA. Gait/stair training limited by fatigue.  -           User Key  (r) = Recorded By, (t) = Taken By, (c) = Cosigned By    Initials Name Provider Type    WILLIAM Jayme Llamas, PT Physical Therapist               Obj/Interventions     Row Name 01/06/22 1000          Motor Skills    Therapeutic Exercise knee; hip; ankle; other (see comments)  ab sets, BKFO, shoulder flexion  -     Row Name 01/06/22 1000          Hip (Therapeutic Exercise)    Hip (Therapeutic Exercise) AROM (active range of motion); isometric exercises  -     Hip AROM (Therapeutic Exercise) bilateral; external rotation; internal rotation; 10 repetitions  -     Hip Isometrics (Therapeutic Exercise) gluteal sets; 10 repetitions  -     Row Name 01/06/22 1000          Knee (Therapeutic Exercise)    Knee (Therapeutic Exercise) isometric exercises; AROM (active range of motion)  -     Knee AROM (Therapeutic Exercise) bilateral; flexion; extension; 10 repetitions  -     Knee  Isometrics (Therapeutic Exercise) quad sets  -     Row Name 01/06/22 1000          Ankle (Therapeutic Exercise)    Ankle (Therapeutic Exercise) AROM (active range of motion)  -     Ankle AROM (Therapeutic Exercise) bilateral; dorsiflexion; plantarflexion; 10 repetitions  -           User Key  (r) = Recorded By, (t) = Taken By, (c) = Cosigned By    Initials Name Provider Type    WILLIAM Jayme Llamas, PT Physical Therapist               Goals/Plan    No documentation.                Clinical Impression     [facility] Name 01/06/22 1000          Pain    Additional Documentation Pain Scale: Numbers Pre/Post-Treatment (Group)  -WILLIAM     Row Name 01/06/22 1000          Pain Scale: Numbers Pre/Post-Treatment    Pretreatment Pain Rating 3/10  -     Posttreatment Pain Rating 4/10  -     Pain Location - Orientation incisional  -     Pain Location back  -     Pain Intervention(s) Repositioned; Ambulation/increased activity  -[facility] Name 01/06/22 1000          Therapy Assessment/Plan (PT)    Patient/Family Therapy Goals Statement (PT) To return home  -     Rehab Potential (PT) good, to achieve stated therapy goals  -     Criteria for Skilled Interventions Met (PT) yes; meets criteria; skilled treatment is necessary  -[facility] Name 01/06/22 1000          Positioning and Restraints    Pre-Treatment Position in bed  -     Post Treatment Position bathroom  -     Bathroom notified nsg; sitting; call light within reach; encouraged to call for assist  -           User Key  (r) = Recorded By, (t) = Taken By, (c) = Cosigned By    Initials Name Provider Type    Jayme Sandoval, PT Physical Therapist               Outcome Measures     Row Name 01/06/22 1000 01/06/22 0830       How much help from another person do you currently need...    Turning from your back to your side while in flat bed without using bedrails? 4  -WILLIAM 3  -OD    Moving from lying on back to sitting on the side of a flat bed without bedrails? 4  -WILLIAM 3  -OD     Moving to and from a bed to a chair (including a wheelchair)? 4  -WILLIAM 3  -OD    Standing up from a chair using your arms (e.g., wheelchair, bedside chair)? 4  -WILLIAM 3  -OD    Climbing 3-5 steps with a railing? 3  -WILLIAM 3  -OD    To walk in hospital room? 3  -WILLIAM 3  -OD    AM-PAC 6 Clicks Score (PT) 22  -WILLIAM 18  -OD    Row Name 01/06/22 1000          Functional Assessment    Outcome Measure Options AM-PAC 6 Clicks Basic Mobility (PT)  -WILILAM           User Key  (r) = Recorded By, (t) = Taken By, (c) = Cosigned By    Initials Name Provider Type    OD Ariana Rhodes, RN Registered Nurse    Jayme Sandoval, PT Physical Therapist                             Physical Therapy Education                 Title: PT OT SLP Therapies (Done)     Topic: Physical Therapy (Done)     Point: Mobility training (Done)     Learning Progress Summary           Patient Acceptance, E,D,H, VU by WILLIAM at 1/6/2022 1000    Comment: Educated on safe sequencing with bed mobility, ambulatory/toilet transfers, gait, and stair training. Reviewed HEP, spinal precautions, and logroll technique via handout.    Acceptance, E,D, VU by WILLIAM at 1/5/2022 0955    Comment: Educated on safe sequencing with ambulatory transfers and gait. Reviewed HEP and spinal precautions.    Swati, KIYA, XENA,DU,NR by SC at 1/4/2022 1133    Comment: reviewed HEP and back precautions                   Point: Home exercise program (Done)     Learning Progress Summary           Patient Acceptance, E,D,H, VU by WILLIAM at 1/6/2022 1000    Comment: Educated on safe sequencing with bed mobility, ambulatory/toilet transfers, gait, and stair training. Reviewed HEP, spinal precautions, and logroll technique via handout.    Acceptance, E,D, VU by WILLIAM at 1/5/2022 0955    Comment: Educated on safe sequencing with ambulatory transfers and gait. Reviewed HEP and spinal precautions.    Swati, KIYA, VU,DU,NR by SC at 1/4/2022 1133    Comment: reviewed HEP and back precautions                   Point: Body mechanics  (Done)     Learning Progress Summary           Patient Acceptance, E,D,H, VU by  at 1/6/2022 1000    Comment: Educated on safe sequencing with bed mobility, ambulatory/toilet transfers, gait, and stair training. Reviewed HEP, spinal precautions, and logroll technique via handout.    Acceptance, E,D, VU by  at 1/5/2022 0955    Comment: Educated on safe sequencing with ambulatory transfers and gait. Reviewed HEP and spinal precautions.    KIYA Longoria VU,DU,NR by SC at 1/4/2022 1133    Comment: reviewed HEP and back precautions                   Point: Precautions (Done)     Learning Progress Summary           Patient Acceptance, E,D,H, VU by WILLIAM at 1/6/2022 1000    Comment: Educated on safe sequencing with bed mobility, ambulatory/toilet transfers, gait, and stair training. Reviewed HEP, spinal precautions, and logroll technique via handout.    Acceptance, E,D, VU by WILLIAM at 1/5/2022 0955    Comment: Educated on safe sequencing with ambulatory transfers and gait. Reviewed HEP and spinal precautions.    KIYA Longoria VU,CASS,NR by SC at 1/4/2022 1133    Comment: reviewed HEP and back precautions                               User Key     Initials Effective Dates Name Provider Type Discipline    SC 06/16/21 -  Amos aGrcias, PT Physical Therapist PT     06/16/21 -  Jayme Llamas, TANYA Physical Therapist PT              PT Recommendation and Plan     Plan of Care Reviewed With: patient  Progress: improving  Outcome Summary: Pt increased ambulation distance to 380 feet using RW and supervision. Pt ascended/descended 1 step using RW, backwards technique, and CGA, and 2 steps using STC on the R, L HHA, and CGA. Bed mobility, STS and toilet transfer performed with supervision. Reviewed HEP, spinal precautions, and logroll technique via handout. Functionally, pt safe to d/c home with assist today from a PT perspective.     Time Calculation:    PT Charges     Row Name 01/06/22 1000             Time Calculation    Start Time 1000  -WILLIAM       PT Received On 01/06/22  -WILLIAM      PT Goal Re-Cert Due Date 01/14/22  -WILLIAM              Time Calculation- PT    Total Timed Code Minutes- PT 23 minute(s)  -WILLIAM              Timed Charges    08567 - PT Therapeutic Exercise Minutes 10  -WILLIAM      90225 - Gait Training Minutes  13  -WLILIAM              Total Minutes    Timed Charges Total Minutes 23  -WILLIAM       Total Minutes 23  -WILLIAM            User Key  (r) = Recorded By, (t) = Taken By, (c) = Cosigned By    Initials Name Provider Type    Jayme Sandoval, PT Physical Therapist              Therapy Charges for Today     Code Description Service Date Service Provider Modifiers Qty    85147943165 HC PT THER PROC EA 15 MIN 1/5/2022 Jayme Llamas, PT GP 1    61273858343 HC GAIT TRAINING EA 15 MIN 1/5/2022 Jayme Llamas, PT GP 1    84765559609 HC PT THER PROC EA 15 MIN 1/6/2022 Jayme Llamas, PT GP 1    83975522319 HC GAIT TRAINING EA 15 MIN 1/6/2022 Jayme Llamas, PT GP 1          PT G-Codes  Outcome Measure Options: AM-PAC 6 Clicks Basic Mobility (PT)  AM-PAC 6 Clicks Score (PT): 22  AM-PAC 6 Clicks Score (OT): 21    Jayme Llamas PT  1/6/2022

## 2022-01-06 NOTE — PLAN OF CARE
Problem: Adult Inpatient Plan of Care  Goal: Plan of Care Review  Flowsheets (Taken 1/6/2022 1000)  Progress: improving  Outcome Summary: Pt increased ambulation distance to 380 feet using RW and supervision. Pt ascended/descended 1 step using RW, backwards technique, and CGA, and 2 steps using STC on the R, L HHA, and CGA. Bed mobility, STS and toilet transfer performed with supervision. Reviewed HEP, spinal precautions, and logroll technique via handout. Functionally, pt safe to d/c home with assist today from a PT perspective.   Goal Outcome Evaluation:  Plan of Care Reviewed With: patient        Progress: improving  Outcome Summary: Pt increased ambulation distance to 380 feet using RW and supervision. Pt ascended/descended 1 step using RW, backwards technique, and CGA, and 2 steps using STC on the R, L HHA, and CGA. Bed mobility, STS and toilet transfer performed with supervision. Reviewed HEP, spinal precautions, and logroll technique via handout. Functionally, pt safe to d/c home with assist today from a PT perspective.

## 2022-01-06 NOTE — PROGRESS NOTES
"NEUROSURGERY PROGRESS NOTE     LOS: 3 days   Patient Care Team:  Cari Rodriguez MD as PCP - General (Internal Medicine)  Cari Rodriguez MD as Referring Physician (Internal Medicine)    Chief Complaint: Low back and left thigh pain with walking and standing intolerance.    POD#: 3 Days Post-Op  Procedures:  L3-4 PLIF extension.  Left L2-3 foraminotomy and discectomy.    Interval History:   Patient Complaints: Incisional pain.  Patient Denies: Preoperative thigh pain.    Vital Signs: Blood pressure 112/57, pulse 81, temperature 97.9 °F (36.6 °C), temperature source Oral, resp. rate 20, height 160 cm (63\"), weight 79.8 kg (176 lb), SpO2 96 %, not currently breastfeeding.  Intake/Output:     Intake/Output Summary (Last 24 hours) at 1/6/2022 0622  Last data filed at 1/5/2022 1300  Gross per 24 hour   Intake 565 ml   Output --   Net 565 ml       Physical Exam:  The patient is alert and in good spirits.  Her incision is intact without any drainage or swelling.     Assessment/Plan:  1.  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  2.  Left L2-3 recess and foraminal stenosis with disc herniation status post decompression.  3.  History of hypertension.  4.  History of coronary artery disease.  5.  Disposition: Mobilize patient.  Home today.  Follow-up with MARIE in the office in approximately 3 weeks.      Yoni Borja MD  01/06/22  06:22 EST    "

## 2022-01-07 NOTE — DISCHARGE SUMMARY
[facility]    Date of Admission: 1/3/2022  Date of Discharge:  1/6/2022    Discharge Diagnosis:   1.  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  Left L2-3 disc herniation with recess and foraminal stenosis    Procedures Performed  Procedure(s):  LUMBAR FUSION extension DECOMPRESSON WITH PEDICLE SCREWS L3-4, with exploration L2-3, left, discectomy       Presenting Problem  Spinal stenosis, lumbar region, with neurogenic claudication [M48.062]     History of Present Illness  Patient is a 71 y.o. female who is well known to Dr. Borja's service.  On 5/2/2019, pt. Underwent a right L3-4 foraminotomy as well as PLIF at L4-5.  More recently, she presented progressive back pain with walking/standing intolerances.  Preoperative studies revealed generous stenosis above her prior construct, with a disc protrusion leftward at L2-3.  As such, pt. Presented for additional lumbar decompression from L2-4 with fusion extension to the L3-4 level from her prior construct on 1/3/2022.       Hospital Course    Over the course of her hospital stay, vitals remained stable and her post-op dressing was clean, dry and intact.  Motor and sensory function were found to be intact throughout.  She was ambulatory, voiding independently, and tolerated PO without associated nausea or vomiting. Pain was minimal and well controlled with oral medications at the time of discharge on 01/06/2022.     Condition on Discharge:  Stable  Discharge to: Home    PATIENT SPECIFIC EDUCATION/PLAN:  1. Follow-up with Neurosurgery in 3 weeks with AP and Lateral lumbar spine XRAYs  2. No driving until follow-up.  3. No lifting greater than 5 lbs   4. NO SITTING UNTIL FOLLOW-UP  5. The patient may get incision wet in the shower beginning on 1/9/2022.   6. NO tub bathing or swimming until follow-up  7. Ice pack to incision(s) as needed for associated pain or swelling     Discharge Medications      Discharge Medications      New Medications      Instructions Start Date   oxyCODONE-acetaminophen 5-325 MG per tablet  Commonly known as: PERCOCET   1 tablet, Oral, 3 Times Daily PRN         Continue These Medications      Instructions Start Date   amLODIPine 10 MG tablet  Commonly known as: NORVASC   10 mg, Oral, Daily      aspirin 81 MG EC tablet   81 mg, Oral, Daily      baclofen 10 MG tablet  Commonly known as: LIORESAL   10 mg, 3 Times Daily PRN      benazepril 40 MG tablet  Commonly known as: LOTENSIN   40 mg, Oral, Daily      hydroCHLOROthiazide 25 MG tablet  Commonly known as: HYDRODIURIL   25 mg, Oral, Daily      ibuprofen 800 MG tablet  Commonly known as: ADVIL,MOTRIN   800 mg, Oral, As Needed      Latanoprost 0.005 % emulsion   1 drop, Ophthalmic, Nightly      metoprolol succinate  MG 24 hr tablet  Commonly known as: TOPROL-XL   100 mg, Daily      multivitamin with minerals tablet tablet   1 tablet, Oral, Daily      omeprazole 20 MG capsule  Commonly known as: priLOSEC   20 mg, Daily      oxybutynin XL 10 MG 24 hr tablet  Commonly known as: DITROPAN-XL   10 mg, Oral, Daily      Restasis 0.05 % ophthalmic emulsion  Generic drug: cycloSPORINE   1 drop, Both Eyes, Every 12 Hours      rosuvastatin 40 MG tablet  Commonly known as: CRESTOR   40 mg, Oral, Nightly      vitamin E 400 UNIT capsule   400 Units, Oral, Daily             Follow-up Appointments  Future Appointments   Date Time Provider Department Center   1/28/2022 11:30 AM Cat Mtz PA-C [facility]     Additional Instructions for the Follow-ups that You Need to Schedule     Ambulatory Referral to Home Health (Hospital)   As directed      Face to Face Visit Date: 1/6/2022    Follow-up provider for Plan of Care?: I will be treating the patient on an ongoing basis.  Please send me the Plan of Care for signature.    Follow-up provider: ANA MCKAY [[id number]]    Reason/Clinical Findings: Diminished mobility following lumbar fusion     Describe mobility limitations that make leaving home difficult: As above    Nursing/Therapeutic Services Requested: Physical Therapy Occupational Therapy    PT orders: Therapeutic exercise Strengthening    Occupational orders: Activities of daily living Strengthening Home safety assessment    Frequency: 1 Week 1         Ambulatory Referral to Physical Therapy   As directed      Discharge Follow-up with Specified Provider: Jonh; 3 Weeks   As directed      To: Jonh    Follow Up: 3 Weeks    Follow Up Details: Follow-up with physician's assistant in 3 weeks with AP and lateral lumbar spine x-rays               Referring Provider  MD PAULINA Hector Erin Yvonne, MD Sarah C Overmyer, PA-C  01/07/22  11:03 EST

## 2022-01-28 ENCOUNTER — OFFICE VISIT (OUTPATIENT)
Dept: NEUROSURGERY | Facility: CLINIC | Age: 72
End: 2022-01-28

## 2022-01-28 ENCOUNTER — HOSPITAL ENCOUNTER (OUTPATIENT)
Dept: GENERAL RADIOLOGY | Facility: HOSPITAL | Age: 72
Discharge: HOME | End: 2022-01-28
Admitting: PHYSICIAN ASSISTANT

## 2022-01-28 VITALS
DIASTOLIC BLOOD PRESSURE: 64 MMHG | TEMPERATURE: 97.7 F | SYSTOLIC BLOOD PRESSURE: 126 MMHG | HEIGHT: 63 IN | WEIGHT: 175 LBS | BODY MASS INDEX: 31.01 KG/M2

## 2022-01-28 DIAGNOSIS — Z98.1 STATUS POST LUMBAR SPINAL FUSION: ICD-10-CM

## 2022-01-28 DIAGNOSIS — M48.062 LUMBAR STENOSIS WITH NEUROGENIC CLAUDICATION: ICD-10-CM

## 2022-01-28 DIAGNOSIS — Z98.1 HISTORY OF LUMBAR FUSION: ICD-10-CM

## 2022-01-28 DIAGNOSIS — M48.062 SPINAL STENOSIS, LUMBAR REGION, WITH NEUROGENIC CLAUDICATION: ICD-10-CM

## 2022-01-28 DIAGNOSIS — M48.062 SPINAL STENOSIS, LUMBAR REGION, WITH NEUROGENIC CLAUDICATION: Primary | ICD-10-CM

## 2022-01-28 PROCEDURE — 99024 POSTOP FOLLOW-UP VISIT: CPT | Performed by: PHYSICIAN ASSISTANT

## 2022-01-28 PROCEDURE — 72100 X-RAY EXAM L-S SPINE 2/3 VWS: CPT

## 2022-01-28 NOTE — PROGRESS NOTES
Subjective     Chief Complaint: s/p extension of lumbar fuion to include L3/4 and L2/3 discectomy on the left    Patient ID: Eneida Bartholomew is a 71 y.o. female is here today for follow-up.    History of Present Illness    Ms. Bartholomew is a very pleasant 70-year-old woman who is well-known to Dr. Borja's service.  On 5/2/2019 she underwent right L3-4 foraminotomies as well as PLIF at L4-5.  A dural rent was encountered intraoperatively. She had a knee replacement in the fall of 2020, but had been increasingly unable to participate with knee physical therapy due to walking and standing intolerance. She had to sit to prepare meals, etc. Due to recurrent low back pain radiating into her left thigh and lateral left leg.   MRI of the lumbar spine showed the previous L4/5 fusion with adjacent level disease at L3/4 causing bilateral foraminal stenosis. Additionally, a leftward disc/ostephyte protrusion at L2/3 was causing left sided foraminal recess stenosis at this level.   She was scheduled for surgery last summer, but this was delayed for COVID. She was re-scheduled for December and it was delayed again. She was able to undergo survey on 1/3/22. Her procedure was without complication and she was discharged to home on POD 3.   Today, she reports that she is doing very well with excellent relief of her left leg pain. She has only needed pain medication occasionally, through she does take tylenol regularly. She had quite a bit of incisional pain with muscle spasms in the immediate postoperative period, but these responded to baclofen and are no longer bothering her. She is recovering her strength well; her bedroom is upstairs and she is able to negotiate these with no weakness.    She denies drainage from her incision, fevers or chills, or bowel/bladder dysfunction.   .  The following portions of the patient's history were reviewed and updated as appropriate: allergies, current medications, past family history, past  medical history, past social history, past surgical history and problem list.    Family history:   Family History   Problem Relation Age of Onset   • Cancer Mother    • Heart disease Father        Social history:   Social History     Socioeconomic History   • Marital status:      Spouse name: Claude   Tobacco Use   • Smoking status: Never Smoker   • Smokeless tobacco: Never Used   Vaping Use   • Vaping Use: Never used   Substance and Sexual Activity   • Alcohol use: No   • Drug use: No   • Sexual activity: Defer       Review of Systems   Constitutional: Positive for activity change, appetite change, fatigue and unexpected weight change. Negative for chills, diaphoresis and fever.   HENT: Negative for congestion, dental problem, drooling, ear discharge, ear pain, facial swelling, hearing loss, mouth sores, nosebleeds, postnasal drip, rhinorrhea, sinus pressure, sneezing, sore throat, tinnitus, trouble swallowing and voice change.    Eyes: Positive for visual disturbance. Negative for photophobia, pain, discharge, redness and itching.   Respiratory: Negative for apnea, cough, choking, chest tightness, shortness of breath, wheezing and stridor.    Cardiovascular: Positive for leg swelling. Negative for chest pain and palpitations.   Gastrointestinal: Positive for constipation. Negative for abdominal distention, abdominal pain, anal bleeding, blood in stool, diarrhea, nausea, rectal pain and vomiting.   Endocrine: Positive for cold intolerance. Negative for heat intolerance, polydipsia, polyphagia and polyuria.   Genitourinary: Negative for decreased urine volume, difficulty urinating, dysuria, enuresis, flank pain, frequency, genital sores, hematuria and urgency.   Musculoskeletal: Negative for arthralgias, back pain, gait problem, joint swelling, myalgias, neck pain and neck stiffness.   Skin: Negative for color change, pallor, rash and wound.   Allergic/Immunologic: Positive for food allergies. Negative for  "environmental allergies and immunocompromised state.   Neurological: Negative for dizziness, tremors, seizures, syncope, facial asymmetry, speech difficulty, weakness, light-headedness, numbness and headaches.   Hematological: Negative for adenopathy. Does not bruise/bleed easily.   Psychiatric/Behavioral: Negative for agitation, behavioral problems, confusion, decreased concentration, dysphoric mood, hallucinations, self-injury, sleep disturbance and suicidal ideas. The patient is not nervous/anxious and is not hyperactive.    All other systems reviewed and are negative.      Objective   Blood pressure 126/64, temperature 97.7 °F (36.5 °C), temperature source Infrared, height 160 cm (63\"), weight 79.4 kg (175 lb), not currently breastfeeding.  Body mass index is 31 kg/m².    Physical Exam  Constitutional:       Appearance: Normal appearance. She is obese.   Eyes:      Pupils: Pupils are equal, round, and reactive to light.   Pulmonary:      Effort: Pulmonary effort is normal.   Musculoskeletal:      Comments: Gait is slow but steady and independent. She has a slightly bent-forward posture. LE strength 4-5/5 and equal bilaterally    Skin:     General: Skin is warm and dry.      Comments: Lumbar incision is clean, dry, and intact. There is a small eschar still present about 3cm from the superior end of the incision. There is no erythema, tenderness, swelling, or heat to touch.    Neurological:      General: No focal deficit present.      Mental Status: She is alert and oriented to person, place, and time.   Psychiatric:         Mood and Affect: Mood normal.         Behavior: Behavior normal.     AP and lateral xrays of the lumbar spine were available for review. These show the L3-5 fusion hardware in good placement and alignment. The images were reviewed by myself and discussed with the patient in the room as well.     Assessment/Plan   Ms Bartholomew is doing well from her fusion extension and left L2/3 discectomy. Her " pre-op leg pain is better and incisional pain is receeding.  .  She does not feel that she needs physical therapy at this time.   We will have her follow up with Dr. Borja in 4-6 weeks. We discussed mild advancement of her restrictions.     Diagnoses and all orders for this visit:    1. Spinal stenosis, lumbar region, with neurogenic claudication (Primary)    2. Lumbar stenosis with neurogenic claudication    3. Status post lumbar spinal fusion        Return in about 5 weeks (around 3/4/2022).     Cat Mtz PA-C

## 2022-01-30 PROBLEM — Z98.1 STATUS POST LUMBAR SPINAL FUSION: Status: ACTIVE | Noted: 2022-01-30

## 2022-02-25 ENCOUNTER — OFFICE VISIT (OUTPATIENT)
Dept: NEUROSURGERY | Facility: CLINIC | Age: 72
End: 2022-02-25

## 2022-02-25 VITALS — TEMPERATURE: 98.2 F | HEIGHT: 63 IN | BODY MASS INDEX: 30.65 KG/M2 | WEIGHT: 173 LBS

## 2022-02-25 DIAGNOSIS — Z98.1 STATUS POST LUMBAR SPINAL FUSION: Primary | ICD-10-CM

## 2022-02-25 DIAGNOSIS — M48.062 SPINAL STENOSIS, LUMBAR REGION, WITH NEUROGENIC CLAUDICATION: ICD-10-CM

## 2022-02-25 PROCEDURE — 99024 POSTOP FOLLOW-UP VISIT: CPT | Performed by: NEUROLOGICAL SURGERY

## 2022-02-25 RX ORDER — LATANOPROST 50 UG/ML
SOLUTION/ DROPS OPHTHALMIC
COMMUNITY
Start: 2022-02-18

## 2022-02-25 NOTE — PROGRESS NOTES
Patient: Eneida Bartholomew  : 1950    Primary Care Provider: Cari Rodriguez MD    Requesting Provider: As above        History    Chief Complaint: Low back and left thigh pain with walking and standing intolerance.    History of Present Illness: Ms. Bartholomew is a 70-year-old woman who is known to our service.  On 2019 she underwent right L3-4 foraminotomy as well as L4-5 PLIF.  She did well but developed recurrent symptoms and as such on 1/3/2022 she underwent PLIF extension to the L3-4 level with foraminotomy on the left at L3-4.  Since surgery her thigh pain has been absent.  She has some pressure-like pain in her low back that occurs with protracted walking.  Her walking is somewhat limited due to right knee problems.  She has had the knee operated on previously.    Review of Systems   Constitutional: Positive for activity change, appetite change, fatigue and unexpected weight change. Negative for chills, diaphoresis and fever.   HENT: Negative for congestion, dental problem, drooling, ear discharge, ear pain, facial swelling, hearing loss, mouth sores, nosebleeds, postnasal drip, rhinorrhea, sinus pressure, sneezing, sore throat, tinnitus, trouble swallowing and voice change.    Eyes: Positive for visual disturbance. Negative for photophobia, pain, discharge, redness and itching.   Respiratory: Negative for apnea, cough, choking, chest tightness, shortness of breath, wheezing and stridor.    Cardiovascular: Positive for leg swelling. Negative for chest pain and palpitations.   Gastrointestinal: Positive for constipation. Negative for abdominal distention, abdominal pain, anal bleeding, blood in stool, diarrhea, nausea, rectal pain and vomiting.   Endocrine: Positive for cold intolerance. Negative for heat intolerance, polydipsia, polyphagia and polyuria.   Genitourinary: Negative for decreased urine volume, difficulty urinating, dysuria, enuresis, flank pain, frequency, genital sores, hematuria  "and urgency.   Musculoskeletal: Negative for arthralgias, back pain, gait problem, joint swelling, myalgias, neck pain and neck stiffness.   Skin: Negative for color change, pallor, rash and wound.   Allergic/Immunologic: Positive for food allergies. Negative for environmental allergies and immunocompromised state.   Neurological: Negative for dizziness, tremors, seizures, syncope, facial asymmetry, speech difficulty, weakness, light-headedness, numbness and headaches.   Hematological: Negative for adenopathy. Does not bruise/bleed easily.   Psychiatric/Behavioral: Negative for agitation, behavioral problems, confusion, decreased concentration, dysphoric mood, hallucinations, self-injury, sleep disturbance and suicidal ideas. The patient is not nervous/anxious and is not hyperactive.    All other systems reviewed and are negative.      The patient's past medical history, past surgical history, family history, and social history have been reviewed at length in the electronic medical record.    Physical Exam:   Temp 98.2 °F (36.8 °C) (Infrared)   Ht 160 cm (63\")   Wt 78.5 kg (173 lb)   BMI 30.65 kg/m²   The patient ambulates in a slightly crooked fashion.  She tends to lean to the right.  This is due to her knee issues.  Lumbar incision looks great.    Medical Decision Making    Data Review:   (All imaging studies were personally reviewed unless stated otherwise)  Plain films of her lumbar spine dated 1/20/2022 demonstrate good position of her construct from L3-5.    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    Treatment Options:   The patient is doing quite well.  She will follow-up in our clinic in approximately 3 months to check on her progress.       Diagnosis Plan   1. Status post lumbar spinal fusion  XR Spine Lumbar 2 or 3 View   2. Spinal stenosis, lumbar region, with neurogenic claudication         Scribed for " Yoni Borja MD by Virgen Mcbride, [facility] 2/25/2022 11:23 EST      I, Dr. Borja, personally performed the services described in the documentation, as scribed in my presence, and it is both accurate and complete.

## 2022-06-08 ENCOUNTER — HOSPITAL ENCOUNTER (OUTPATIENT)
Dept: GENERAL RADIOLOGY | Facility: HOSPITAL | Age: 72
Discharge: HOME | End: 2022-06-08
Admitting: NEUROLOGICAL SURGERY

## 2022-06-08 DIAGNOSIS — Z98.1 STATUS POST LUMBAR SPINAL FUSION: ICD-10-CM

## 2022-06-08 PROCEDURE — 72100 X-RAY EXAM L-S SPINE 2/3 VWS: CPT

## 2022-06-14 ENCOUNTER — OFFICE VISIT (OUTPATIENT)
Dept: NEUROSURGERY | Facility: CLINIC | Age: 72
End: 2022-06-14

## 2022-06-14 VITALS
WEIGHT: 173.2 LBS | BODY MASS INDEX: 30.69 KG/M2 | HEIGHT: 63 IN | DIASTOLIC BLOOD PRESSURE: 68 MMHG | SYSTOLIC BLOOD PRESSURE: 112 MMHG | TEMPERATURE: 97.8 F

## 2022-06-14 DIAGNOSIS — M48.062 LUMBAR STENOSIS WITH NEUROGENIC CLAUDICATION: Primary | ICD-10-CM

## 2022-06-14 DIAGNOSIS — Z98.1 STATUS POST LUMBAR SPINAL FUSION: ICD-10-CM

## 2022-06-14 PROCEDURE — 99213 OFFICE O/P EST LOW 20 MIN: CPT | Performed by: PHYSICIAN ASSISTANT

## 2022-06-14 RX ORDER — NAPROXEN 500 MG/1
TABLET ORAL
COMMUNITY
Start: 2022-05-25

## 2022-06-14 NOTE — PROGRESS NOTES
Subjective     Chief Complaint: back pain s/p extension of fusion    Patient ID: Eneida Bartholomew is a 71 y.o. female is here today for follow-up.    History of Present Illness  Ms. Bartholomew is a 70-year-old woman who is known to our service.  On 5/2/2019 she underwent right L3-4 foraminotomy as well as L4-5 PLIF.  She did well but developed recurrent symptoms and as such on 1/3/2022 she underwent PLIF extension to the L3-4 level with foraminotomy on the left at L3-4.  Since surgery her thigh pain has been absent.  She has some pressure-like pain in her low back that occurs with standing >10 min at a time Her back pain is relieved with sitting. She does better walking and has no significant back pain and no radicular pain with this, though her.walking is somewhat limited due to right knee problems.  She has had the knee operated on previously.  She does note that she needs a shopping cart or her walker if she is out for longer excursions.  She feels that overall her activity is limited but she has had good pain relief from her surgery except for with standing to wash dishes, cook etc.    The following portions of the patient's history were reviewed and updated as appropriate: allergies, current medications, past family history, past medical history, past social history, past surgical history and problem list.    Family history:   Family History   Problem Relation Age of Onset   • Cancer Mother    • Heart disease Father        Social history:   Social History     Socioeconomic History   • Marital status:      Spouse name: Claude   Tobacco Use   • Smoking status: Never Smoker   • Smokeless tobacco: Never Used   Vaping Use   • Vaping Use: Never used   Substance and Sexual Activity   • Alcohol use: No   • Drug use: No   • Sexual activity: Defer       Review of Systems   Constitutional: Negative for activity change, appetite change, chills, diaphoresis, fatigue, fever and unexpected weight change.   HENT:  Negative for congestion, dental problem, drooling, ear discharge, ear pain, facial swelling, hearing loss, mouth sores, nosebleeds, postnasal drip, rhinorrhea, sinus pressure, sinus pain, sneezing, sore throat, tinnitus, trouble swallowing and voice change.    Eyes: Negative for photophobia, pain, discharge, redness, itching and visual disturbance.   Respiratory: Negative for apnea, cough, choking, chest tightness, shortness of breath, wheezing and stridor.    Cardiovascular: Negative for chest pain, palpitations and leg swelling.   Gastrointestinal: Negative for abdominal distention, abdominal pain, anal bleeding, blood in stool, constipation, diarrhea, nausea, rectal pain and vomiting.   Endocrine: Negative for cold intolerance, heat intolerance, polydipsia, polyphagia and polyuria.   Genitourinary: Negative for decreased urine volume, difficulty urinating, dyspareunia, dysuria, enuresis, flank pain, frequency, genital sores, hematuria, menstrual problem, pelvic pain, urgency, vaginal bleeding, vaginal discharge and vaginal pain.   Musculoskeletal: Positive for back pain. Negative for arthralgias, gait problem, joint swelling, myalgias, neck pain and neck stiffness.   Skin: Negative for color change, pallor, rash and wound.   Allergic/Immunologic: Negative for environmental allergies, food allergies and immunocompromised state.   Neurological: Negative for dizziness, tremors, seizures, syncope, facial asymmetry, speech difficulty, weakness, light-headedness, numbness and headaches.   Hematological: Negative for adenopathy. Does not bruise/bleed easily.   Psychiatric/Behavioral: Negative for agitation, behavioral problems, confusion, decreased concentration, dysphoric mood, hallucinations, self-injury, sleep disturbance and suicidal ideas. The patient is not nervous/anxious and is not hyperactive.        Objective   Blood pressure 112/68, temperature 97.8 °F (36.6 °C), temperature source Infrared, height 160 cm  "(63\"), weight 78.6 kg (173 lb 3.2 oz), not currently breastfeeding.  Body mass index is 30.68 kg/m².    Physical Exam  Constitutional:       Appearance: Normal appearance.   Eyes:      Pupils: Pupils are equal, round, and reactive to light.   Cardiovascular:      Pulses: Normal pulses.   ____Pulmonary:      Effort: Pulmonary effort is normal.      Breath sounds: Normal breath sounds.   Musculoskeletal:      Comments: Gait steady and independent with no spasticity. She does not use a cane or walker in the exam room     Skin:     Comments: Incision has healed very well    _Neurological:      General: No focal deficit present.      Mental Status:   alert and oriented to person, place, and time.   Psychiatric:         Mood and Affect: Mood normal.         Behavior: Behavior normal.    AP and lateral x-rays of the lumbar spine reviewed today.  These are stable compared to 3 months prior when she had her first postop x-rays performed. Fusion hardware is well placed with no complications. Degenerative changes in the spine overall are also unchanged compared to this previous xray    Assessment & Plan   Ms Monique is doing well overall. She has limited activity and we discussed some strategies to improve this.  She has access to the [facility] through the [employer] program which pays for her fees.  She would have access to the pool for walking or for out water aerobics.  This would be ideal as it would allow her to strengthen her core and move without stress to her back or knee.  She will look into this possibility.  She is not interested in formal physical therapy at this time.  She lives independently and has stairs to navigate at home and does reasonably well with all of these.    We will plan to follow-up with her in January 2023 with new x-rays for what will hopefully be a final visit, sooner PRN    Diagnoses and all orders for this visit:    1. Lumbar stenosis with neurogenic claudication (Primary)  - "     XR Spine Lumbar 2 or 3 View; Future    2. Status post lumbar spinal fusion  -     XR Spine Lumbar 2 or 3 View; Future        Return in about 7 months (around 1/14/2023).    ANGELA TaveraC

## 2022-10-28 ENCOUNTER — HOSPITAL ENCOUNTER (OUTPATIENT)
Dept: GENERAL RADIOLOGY | Facility: HOSPITAL | Age: 72
Discharge: HOME | End: 2022-10-28

## 2022-10-28 ENCOUNTER — TRANSCRIBE ORDERS (OUTPATIENT)
Dept: ADMINISTRATIVE | Facility: HOSPITAL | Age: 72
End: 2022-10-28

## 2022-10-28 DIAGNOSIS — M79.674 PAIN OF TOE OF RIGHT FOOT: ICD-10-CM

## 2022-10-28 DIAGNOSIS — M79.674 PAIN IN RIGHT TOE(S): Primary | ICD-10-CM

## 2022-11-01 ENCOUNTER — OFFICE VISIT (OUTPATIENT)
Dept: CARDIOLOGY | Facility: CLINIC | Age: 72
End: 2022-11-01

## 2022-11-01 VITALS
BODY MASS INDEX: 30.3 KG/M2 | DIASTOLIC BLOOD PRESSURE: 78 MMHG | WEIGHT: 171 LBS | SYSTOLIC BLOOD PRESSURE: 130 MMHG | HEART RATE: 73 BPM | HEIGHT: 63 IN | OXYGEN SATURATION: 95 %

## 2022-11-01 DIAGNOSIS — E78.00 PURE HYPERCHOLESTEROLEMIA: ICD-10-CM

## 2022-11-01 DIAGNOSIS — I25.10 CORONARY ARTERY DISEASE INVOLVING NATIVE CORONARY ARTERY OF NATIVE HEART WITHOUT ANGINA PECTORIS: Primary | ICD-10-CM

## 2022-11-01 DIAGNOSIS — I10 PRIMARY HYPERTENSION: ICD-10-CM

## 2022-11-01 PROCEDURE — 99214 OFFICE O/P EST MOD 30 MIN: CPT | Performed by: INTERNAL MEDICINE

## 2022-11-01 NOTE — PROGRESS NOTES
Subjective:     Encounter Date:11/01/2022    Primary Care Physician: Regan Delgado MD      Patient ID: Eneida Bartholomew is a 72 y.o. female.    Chief Complaint:Follow-up    PROBLEM LIST:  1. Coronary artery disease:  1. 2005, abnormal Cardiolite.  2. Cardiac catheterization, Dr. Daniel with 75% to 80% mid LAD, 20% proximal circumflex, 25% proximal RCA with a small AV fistula from the first diagonal to the coronary sinus.  3. 2005, LAD stenting with a 3.0 x 28 mm Cypher JORDYN by Dr. Tyson Pulliam.  4. 10/14/2020 [facility] for high risk myocardial perfusion study.  Dr. Vidales.  Patent stent in the LAD.  Diagonal free of disease.  Circumflex normal.  RCA 40 to 50% proximal stenosis, normal IFR.  2. Hypertension.  1. Echo, 12/2014 with an EF of 65% to 70%, mild tricuspid regurgitation and mild concentric LVH.    3. Dyslipidemia.  4. COVID-19 9/2021  1. Did not require hospitalization.  Received infusion therapy.  5. RBBB  6. GERD.  7. History of peptic ulcer disease.  8. Overactive bladder.  9. Arthritis.  10. Anxiety.  11. Fibromyalgia.  12. Diverticulosis  13. Surgeries:  1. Cholecystectomy.  2. Right foot surgery x3.  3. Left foot surgery x3.  4. Remote lumpectomy, reportedly benign.  5. Cataract extraction  6. Lumbar laminectomy   7. Knee surgery  8. Back surgery      Allergies   Allergen Reactions   • Prednisone Shortness Of Breath     And nervous   • Shellfish-Derived Products Anaphylaxis, Shortness Of Breath, Swelling and Rash     ALL SEAFOOD, FISH   • Erythromycin Rash   • Sulfa Antibiotics Rash         Current Outpatient Medications:   •  amLODIPine (NORVASC) 10 MG tablet, Take 10 mg by mouth Daily., Disp: , Rfl:   •  aspirin 81 MG EC tablet, Take 81 mg by mouth Daily., Disp: , Rfl:   •  baclofen (LIORESAL) 10 MG tablet, 10 mg 3 (Three) Times a Day As Needed., Disp: , Rfl:   •  benazepril (LOTENSIN) 40 MG tablet, Take 40 mg by mouth Daily., Disp: , Rfl:   •  hydroCHLOROthiazide (HYDRODIURIL) 25 MG  tablet, Take 25 mg by mouth Daily., Disp: , Rfl:   •  ibuprofen (ADVIL,MOTRIN) 800 MG tablet, Take 800 mg by mouth As Needed for Mild Pain ., Disp: , Rfl:   •  latanoprost (XALATAN) 0.005 % ophthalmic solution, , Disp: , Rfl:   •  metoprolol succinate XL (TOPROL-XL) 100 MG 24 hr tablet, 100 mg Daily., Disp: , Rfl:   •  Multiple Vitamins-Minerals (MULTIVITAL PO), Take 1 tablet by mouth Daily., Disp: , Rfl:   •  naproxen (NAPROSYN) 500 MG tablet, , Disp: , Rfl:   •  omeprazole (priLOSEC) 20 MG capsule, 20 mg Daily., Disp: , Rfl:   •  oxybutynin XL (DITROPAN-XL) 10 MG 24 hr tablet, Take 10 mg by mouth Daily., Disp: , Rfl:   •  Restasis 0.05 % ophthalmic emulsion, Administer 1 drop to both eyes Every 12 (Twelve) Hours., Disp: , Rfl:   •  rosuvastatin (CRESTOR) 40 MG tablet, Take 40 mg by mouth Every Night., Disp: , Rfl:   •  vitamin E 400 UNIT capsule, Take 400 Units by mouth Daily., Disp: , Rfl:         History of Present Illness    Patient is a 72-year-old  female who we are seeing today for annual follow-up of coronary artery disease.  Since last being seen patient notes to overall be doing relatively well.  Routine labs are followed with primary care.  She denies any chest pain, pressure, tightness.  Denies any increasing shortness of breath.  No reported syncope, near-syncope, or edema.  Denies any symptoms like her previous angina.    The following portions of the patient's history were reviewed and updated as appropriate: allergies, current medications, past family history, past medical history, past social history, past surgical history and problem list.      Social History     Tobacco Use   • Smoking status: Never   • Smokeless tobacco: Never   Vaping Use   • Vaping Use: Never used   Substance Use Topics   • Alcohol use: No   • Drug use: No         Review of Systems   Constitutional: Negative.   Cardiovascular: Negative for chest pain, dyspnea on exertion, leg swelling, palpitations and syncope.  "  Respiratory: Negative.  Negative for shortness of breath.    Hematologic/Lymphatic: Negative for bleeding problem. Does not bruise/bleed easily.   Skin: Negative for rash.   Musculoskeletal: Positive for arthritis and back pain. Negative for muscle weakness and myalgias.   Gastrointestinal: Positive for heartburn. Negative for nausea and vomiting.   Neurological: Positive for loss of balance. Negative for dizziness, light-headedness and numbness.          Objective:   /78   Pulse 73   Ht 160 cm (63\")   Wt 77.6 kg (171 lb)   SpO2 95%   BMI 30.29 kg/m²         Vitals reviewed.   Constitutional:       Appearance: Well-developed and not in distress.   Neck:      Vascular: No JVD.      Trachea: No tracheal deviation.   Pulmonary:      Effort: Pulmonary effort is normal.      Breath sounds: Normal breath sounds.   Cardiovascular:      Normal rate. Regular rhythm.   Pulses:     Intact distal pulses.   Edema:     Peripheral edema absent.   Abdominal:      General: Bowel sounds are normal.      Palpations: Abdomen is soft.      Tenderness: There is no abdominal tenderness.   Musculoskeletal:         General: No deformity. Skin:     General: Skin is warm and dry.   Neurological:      Mental Status: Alert and oriented to person, place, and time.         Procedures          Assessment:   Assessment & Plan      Diagnoses and all orders for this visit:    1. Coronary artery disease involving native coronary artery of native heart without angina pectoris (Primary), stable.  No angina.  On aspirin    2. Primary hypertension, controlled.  On beta-blocker, amlodipine.    3. Pure hypercholesterolemia, stable.  On statin.  Labs with primary care.      Plan:  1. Continue current cardiac medications.  2. Attempt to obtain labs from primary care.  3. Follow-up in 1 years time or sooner if needed.         AMANDA Grhaam   Dictated utilizing Dragon dictation  "

## 2022-12-12 ENCOUNTER — HOSPITAL ENCOUNTER (EMERGENCY)
Facility: HOSPITAL | Age: 72
Discharge: HOME | End: 2022-12-12
Attending: EMERGENCY MEDICINE | Admitting: EMERGENCY MEDICINE

## 2022-12-12 ENCOUNTER — APPOINTMENT (OUTPATIENT)
Dept: GENERAL RADIOLOGY | Facility: HOSPITAL | Age: 72
End: 2022-12-12

## 2022-12-12 VITALS
OXYGEN SATURATION: 98 % | SYSTOLIC BLOOD PRESSURE: 107 MMHG | BODY MASS INDEX: 29.41 KG/M2 | DIASTOLIC BLOOD PRESSURE: 70 MMHG | TEMPERATURE: 97.8 F | RESPIRATION RATE: 18 BRPM | HEIGHT: 63 IN | WEIGHT: 166 LBS | HEART RATE: 79 BPM

## 2022-12-12 DIAGNOSIS — R07.9 CHEST PAIN, UNSPECIFIED TYPE: Primary | ICD-10-CM

## 2022-12-12 DIAGNOSIS — R10.13 EPIGASTRIC PAIN: ICD-10-CM

## 2022-12-12 LAB
ALBUMIN SERPL-MCNC: 4.4 G/DL (ref 3.5–5.2)
ALBUMIN/GLOB SERPL: 1.5 G/DL
ALP SERPL-CCNC: 70 U/L (ref 39–117)
ALT SERPL W P-5'-P-CCNC: 14 U/L (ref 1–33)
ANION GAP SERPL CALCULATED.3IONS-SCNC: 13 MMOL/L (ref 5–15)
AST SERPL-CCNC: 19 U/L (ref 1–32)
BASOPHILS # BLD AUTO: 0.07 10*3/MM3 (ref 0–0.2)
BASOPHILS NFR BLD AUTO: 0.9 % (ref 0–1.5)
BILIRUB SERPL-MCNC: 0.7 MG/DL (ref 0–1.2)
BUN SERPL-MCNC: 9 MG/DL (ref 8–23)
BUN/CREAT SERPL: 9.9 (ref 7–25)
CALCIUM SPEC-SCNC: 10.4 MG/DL (ref 8.6–10.5)
CHLORIDE SERPL-SCNC: 95 MMOL/L (ref 98–107)
CO2 SERPL-SCNC: 28 MMOL/L (ref 22–29)
CREAT SERPL-MCNC: 0.91 MG/DL (ref 0.57–1)
DEPRECATED RDW RBC AUTO: 39.8 FL (ref 37–54)
EGFRCR SERPLBLD CKD-EPI 2021: 66.3 ML/MIN/1.73
EOSINOPHIL # BLD AUTO: 0.15 10*3/MM3 (ref 0–0.4)
EOSINOPHIL NFR BLD AUTO: 1.8 % (ref 0.3–6.2)
ERYTHROCYTE [DISTWIDTH] IN BLOOD BY AUTOMATED COUNT: 12.6 % (ref 12.3–15.4)
GLOBULIN UR ELPH-MCNC: 2.9 GM/DL
GLUCOSE SERPL-MCNC: 139 MG/DL (ref 65–99)
HCT VFR BLD AUTO: 43 % (ref 34–46.6)
HGB BLD-MCNC: 14.7 G/DL (ref 12–15.9)
HOLD SPECIMEN: NORMAL
IMM GRANULOCYTES # BLD AUTO: 0.03 10*3/MM3 (ref 0–0.05)
IMM GRANULOCYTES NFR BLD AUTO: 0.4 % (ref 0–0.5)
LIPASE SERPL-CCNC: 16 U/L (ref 13–60)
LYMPHOCYTES # BLD AUTO: 1.55 10*3/MM3 (ref 0.7–3.1)
LYMPHOCYTES NFR BLD AUTO: 19 % (ref 19.6–45.3)
MCH RBC QN AUTO: 29.9 PG (ref 26.6–33)
MCHC RBC AUTO-ENTMCNC: 34.2 G/DL (ref 31.5–35.7)
MCV RBC AUTO: 87.6 FL (ref 79–97)
MONOCYTES # BLD AUTO: 0.63 10*3/MM3 (ref 0.1–0.9)
MONOCYTES NFR BLD AUTO: 7.7 % (ref 5–12)
NEUTROPHILS NFR BLD AUTO: 5.73 10*3/MM3 (ref 1.7–7)
NEUTROPHILS NFR BLD AUTO: 70.2 % (ref 42.7–76)
NRBC BLD AUTO-RTO: 0 /100 WBC (ref 0–0.2)
NT-PROBNP SERPL-MCNC: 140.8 PG/ML (ref 0–900)
PLATELET # BLD AUTO: 196 10*3/MM3 (ref 140–450)
PMV BLD AUTO: 9.6 FL (ref 6–12)
POTASSIUM SERPL-SCNC: 3.5 MMOL/L (ref 3.5–5.2)
PROT SERPL-MCNC: 7.3 G/DL (ref 6–8.5)
QT INTERVAL: 398 MS
QT INTERVAL: 422 MS
QTC INTERVAL: 471 MS
QTC INTERVAL: 492 MS
RBC # BLD AUTO: 4.91 10*6/MM3 (ref 3.77–5.28)
SODIUM SERPL-SCNC: 136 MMOL/L (ref 136–145)
TROPONIN T SERPL-MCNC: <0.01 NG/ML (ref 0–0.03)
TROPONIN T SERPL-MCNC: <0.01 NG/ML (ref 0–0.03)
WBC NRBC COR # BLD: 8.16 10*3/MM3 (ref 3.4–10.8)
WHOLE BLOOD HOLD COAG: NORMAL
WHOLE BLOOD HOLD SPECIMEN: NORMAL

## 2022-12-12 PROCEDURE — 93005 ELECTROCARDIOGRAM TRACING: CPT

## 2022-12-12 PROCEDURE — 83690 ASSAY OF LIPASE: CPT | Performed by: EMERGENCY MEDICINE

## 2022-12-12 PROCEDURE — 84484 ASSAY OF TROPONIN QUANT: CPT | Performed by: EMERGENCY MEDICINE

## 2022-12-12 PROCEDURE — 71045 X-RAY EXAM CHEST 1 VIEW: CPT

## 2022-12-12 PROCEDURE — 36415 COLL VENOUS BLD VENIPUNCTURE: CPT

## 2022-12-12 PROCEDURE — 99284 EMERGENCY DEPT VISIT MOD MDM: CPT

## 2022-12-12 PROCEDURE — 93005 ELECTROCARDIOGRAM TRACING: CPT | Performed by: EMERGENCY MEDICINE

## 2022-12-12 PROCEDURE — 85025 COMPLETE CBC W/AUTO DIFF WBC: CPT | Performed by: EMERGENCY MEDICINE

## 2022-12-12 PROCEDURE — 83880 ASSAY OF NATRIURETIC PEPTIDE: CPT | Performed by: EMERGENCY MEDICINE

## 2022-12-12 PROCEDURE — 80053 COMPREHEN METABOLIC PANEL: CPT | Performed by: EMERGENCY MEDICINE

## 2022-12-12 PROCEDURE — 99283 EMERGENCY DEPT VISIT LOW MDM: CPT | Performed by: INTERNAL MEDICINE

## 2022-12-12 RX ORDER — ONDANSETRON 4 MG/1
4 TABLET, ORALLY DISINTEGRATING ORAL 4 TIMES DAILY PRN
Qty: 15 TABLET | Refills: 0 | Status: SHIPPED | OUTPATIENT
Start: 2022-12-12

## 2022-12-12 RX ORDER — LIDOCAINE HYDROCHLORIDE 20 MG/ML
15 SOLUTION OROPHARYNGEAL ONCE
Status: COMPLETED | OUTPATIENT
Start: 2022-12-12 | End: 2022-12-12

## 2022-12-12 RX ORDER — ASPIRIN 81 MG/1
324 TABLET, CHEWABLE ORAL ONCE
Status: DISCONTINUED | OUTPATIENT
Start: 2022-12-12 | End: 2022-12-12 | Stop reason: HOSPADM

## 2022-12-12 RX ORDER — SODIUM CHLORIDE 0.9 % (FLUSH) 0.9 %
10 SYRINGE (ML) INJECTION AS NEEDED
Status: DISCONTINUED | OUTPATIENT
Start: 2022-12-12 | End: 2022-12-12 | Stop reason: HOSPADM

## 2022-12-12 RX ORDER — FAMOTIDINE 20 MG/1
20 TABLET, FILM COATED ORAL 2 TIMES DAILY
Qty: 14 TABLET | Refills: 0 | Status: SHIPPED | OUTPATIENT
Start: 2022-12-12 | End: 2022-12-19

## 2022-12-12 RX ORDER — ALUMINA, MAGNESIA, AND SIMETHICONE 2400; 2400; 240 MG/30ML; MG/30ML; MG/30ML
15 SUSPENSION ORAL ONCE
Status: COMPLETED | OUTPATIENT
Start: 2022-12-12 | End: 2022-12-12

## 2022-12-12 RX ADMIN — LIDOCAINE HYDROCHLORIDE 15 ML: 20 SOLUTION OROPHARYNGEAL at 10:40

## 2022-12-12 RX ADMIN — ALUMINUM HYDROXIDE, MAGNESIUM HYDROXIDE, AND DIMETHICONE 15 ML: 400; 400; 40 SUSPENSION ORAL at 10:40

## 2022-12-12 NOTE — ED PROVIDER NOTES
[place]    EMERGENCY DEPARTMENT ENCOUNTER      Pt Name: Eneida Bartholomew  MRN: [id number]  YOB: 1950  Date of evaluation: 12/12/2022  Provider: Dmitriy Davidson DO    CHIEF COMPLAINT       Chief Complaint   Patient presents with   • Chest Pain         HISTORY OF PRESENT ILLNESS  (Location/Symptom, Timing/Onset, Context/Setting, Quality, Duration, Modifying Factors, Severity.)   Eneida Bartholomew is a 72 y.o. female who presents to the emergency department for evaluation of for 5 days of retrosternal epigastric discomfort, burning sensation which been waxing and waning in severity over the last week.  She has a history of acid reflux, has been compliant with her Prilosec.  She also is a history of coronary artery disease, follows Dr. Funez.  She does not have any recurrent chest pain she states she has been doing pretty well in general, last catheterization was about 1.5 years ago with no stent placement at that time.  She has previous history of cath with stents 5 years ago.  She been compliant with her medications.  She denies any fever chills, no nausea vomiting or diarrhea.  Patient states she does not have any current chest pain or pressure at this time.  Denies any chest pain with exertion.  She denies any recent illness, no known sick contacts, she denies any other acute symptom complaints at this time.      Nursing notes were reviewed.    REVIEW OF SYSTEMS    (2-9 systems for level 4, 10 or more for level 5)   ROS:  General:  No fevers, no chills, no weakness  Cardiovascular:  + chest pain, no palpitations  Respiratory:  No shortness of breath, no cough, no wheezing  Gastrointestinal:  No pain, + nausea, no vomiting, no diarrhea  Musculoskeletal:  No muscle pain, no joint pain  Skin:  No rash  Neurologic:  No headache  Psychiatric:  No anxiety  Genitourinary:  No dysuria, no hematuria    Except as noted above the remainder of the review of systems was reviewed and negative.        PAST MEDICAL HISTORY     Past Medical History:   Diagnosis Date   • Arthritis    • Arthritis    • Bronchitis    • Chicken pox    • Coronary artery disease    • Fibromyalgia    • GERD (gastroesophageal reflux disease)    • Glaucoma    • Glaucoma    • History of cardiac cath    • History of echocardiogram    • History of Holter monitoring    • History of migraine    • History of PTCA    • History of stress test    • Hyperlipidemia    • Hypertension    • Measles    • Stomach ulcer    • UTI (urinary tract infection)          SURGICAL HISTORY       Past Surgical History:   Procedure Laterality Date   • BREAST BIOPSY Right    • BUNIONECTOMY Bilateral     x4   • CATARACT EXTRACTION EXTRACAPSULAR W/ INTRAOCULAR LENS IMPLANTATION Bilateral    • CHOLECYSTECTOMY     • CORONARY ANGIOPLASTY WITH STENT PLACEMENT     • EYE SURGERY Bilateral     CATARACTS REMOVED   • KNEE ARTHROSCOPY Right 2021   • LUMBAR LAMINECTOMY WITH FUSION N/A 5/2/2019    Procedure: LUMBAR FUSION DECOMPRESSON WITH PEDICLE SCREWS L4-5, LAMINECTOMY, FORAMINOTOMY RIGHT L3-4;  Surgeon: Yoni Borja MD;  [facility];  Service: Neurosurgery   • LUMBAR LAMINECTOMY WITH FUSION N/A 1/3/2022    Procedure: LUMBAR FUSION extension DECOMPRESSON WITH PEDICLE SCREWS L3-4, with exploration L2-3, left, discectomy;  Surgeon: Yoni Borja MD;  [facility];  Service: Neurosurgery;  Laterality: N/A;         CURRENT MEDICATIONS       Current Facility-Administered Medications:   •  aspirin chewable tablet 324 mg, 324 mg, Oral, Once, Dmitriy Davidson DO  •  sodium chloride 0.9 % flush 10 mL, 10 mL, Intravenous, PRN, Dmitriy Davidson,     Current Outpatient Medications:   •  amLODIPine (NORVASC) 10 MG tablet, Take 10 mg by mouth Daily., Disp: , Rfl:   •  aspirin 81 MG EC tablet, Take 81 mg by mouth Daily., Disp: , Rfl:   •  baclofen (LIORESAL) 10 MG tablet, 10 mg 3 (Three) Times a Day As Needed., Disp: , Rfl:   •  benazepril (LOTENSIN) 40  MG tablet, Take 40 mg by mouth Daily., Disp: , Rfl:   •  hydroCHLOROthiazide (HYDRODIURIL) 25 MG tablet, Take 25 mg by mouth Daily., Disp: , Rfl:   •  ibuprofen (ADVIL,MOTRIN) 800 MG tablet, Take 800 mg by mouth As Needed for Mild Pain ., Disp: , Rfl:   •  latanoprost (XALATAN) 0.005 % ophthalmic solution, , Disp: , Rfl:   •  metoprolol succinate XL (TOPROL-XL) 100 MG 24 hr tablet, 100 mg Daily., Disp: , Rfl:   •  Multiple Vitamins-Minerals (MULTIVITAL PO), Take 1 tablet by mouth Daily., Disp: , Rfl:   •  omeprazole (priLOSEC) 20 MG capsule, 20 mg Daily., Disp: , Rfl:   •  famotidine (PEPCID) 20 MG tablet, Take 1 tablet by mouth 2 (Two) Times a Day for 7 days., Disp: 14 tablet, Rfl: 0  •  naproxen (NAPROSYN) 500 MG tablet, , Disp: , Rfl:   •  ondansetron ODT (ZOFRAN-ODT) 4 MG disintegrating tablet, Place 1 tablet on the tongue 4 (Four) Times a Day As Needed for Nausea or Vomiting., Disp: 15 tablet, Rfl: 0  •  oxybutynin XL (DITROPAN-XL) 10 MG 24 hr tablet, Take 10 mg by mouth Daily., Disp: , Rfl:   •  Restasis 0.05 % ophthalmic emulsion, Administer 1 drop to both eyes Every 12 (Twelve) Hours., Disp: , Rfl:   •  rosuvastatin (CRESTOR) 40 MG tablet, Take 40 mg by mouth Every Night., Disp: , Rfl:   •  vitamin E 400 UNIT capsule, Take 400 Units by mouth Daily., Disp: , Rfl:     ALLERGIES     Prednisone, Shellfish-derived products, Erythromycin, and Sulfa antibiotics    FAMILY HISTORY       Family History   Problem Relation Age of Onset   • Cancer Mother    • Heart disease Father           SOCIAL HISTORY       Social History     Socioeconomic History   • Marital status:      Spouse name: Claude   Tobacco Use   • Smoking status: Never   • Smokeless tobacco: Never   Vaping Use   • Vaping Use: Never used   Substance and Sexual Activity   • Alcohol use: No   • Drug use: No   • Sexual activity: Defer         PHYSICAL EXAM    (up to 7 for level 4, 8 or more for level 5)     Vitals:    12/12/22 1100 12/12/22 1200  12/12/22 1210 12/12/22 1230   BP: 135/75 123/79  107/70   Patient Position:    Lying   Pulse:   79 79   Resp:       Temp:       TempSrc:       SpO2: 98% 93% 100% 98%   Weight:       Height:           Physical Exam  General : Patient is awake, alert, oriented, in no acute distress, nontoxic appearing  HEENT: Pupils are equally round, EOMI, conjunctivae clear, there is no injection no icterus.  Oral mucosa is moist, uvula midline  Neck: Neck is supple, full range of motion, trachea midline  Cardiac: Heart regular rate, rhythm, no murmurs, rubs, or gallops  Lungs: Lungs are clear to auscultation, there is no wheezing, rhonchi, or rales. There is no use of accessory muscles  Chest wall: There is no tenderness to palpation over the chest wall or over ribs  Abdomen: Abdomen is soft, nontender, nondistended.  No peritoneal signs exam.  There are no firm or pulsatile masses, no rebound rigidity or guarding  Musculoskeletal: 5 out of 5 strength in all 4 extremities.  No focal muscle deficits are appreciated  Neuro: Motor intact, sensory intact, level of consciousness is normal  Dermatology: Skin is warm and dry  Psych: Mentation is grossly normal, cognition is grossly normal. Affect is appropriate      DIAGNOSTIC RESULTS     EKG: All EKGs are interpreted by the Emergency Department Physician who either signs or Co-signs this chart in the absence of a cardiologist.    ECG 12 Lead ED Triage Standing Order; Chest Pain   Preliminary Result   Test Reason : ED Triage Standing Order~   Blood Pressure :   */*   mmHG   Vent. Rate :  75 BPM     Atrial Rate :  75 BPM      P-R Int : 168 ms          QRS Dur : 134 ms       QT Int : 422 ms       P-R-T Axes :  -4  10  -4 degrees      QTc Int : 471 ms      Normal sinus rhythm   Right bundle branch block   Inferior infarct , age undetermined   Abnormal ECG   When compared with ECG of 12-DEC-2022 09:56,   premature atrial complexes are no longer present      Referred By: EDMD            Confirmed By:       ECG 12 Lead ED Triage Standing Order; Chest Pain   Final Result   Test Reason : ED Triage Standing Order~   Blood Pressure :   */*   mmHG   Vent. Rate :  92 BPM     Atrial Rate :  92 BPM      P-R Int : 148 ms          QRS Dur : 132 ms       QT Int : 398 ms       P-R-T Axes :  40  20  -2 degrees      QTc Int : 492 ms      Sinus rhythm with premature atrial complexes   Right bundle branch block   Abnormal ECG   When compared with ECG of 29-APR-2019 10:24,   premature atrial complexes are now present   Right bundle branch block has replaced Nonspecific intraventricular block   Confirmed by PADMA WEST MD ([id number]) on 12/12/2022 10:15:00 AM      Referred By: EDMD           Confirmed By: PADMA WEST MD          RADIOLOGY:   Non-plain film images such as CT, Ultrasound and MRI are read by the radiologist. Plain radiographic images are visualized and preliminarily interpreted by the emergency physician with the below findings:      [] Radiologist's Report Reviewed:  XR Chest 1 View   Final Result   No acute cardiopulmonary findings.       This report was finalized on 12/12/2022 10:57 AM by Dominic Mascorro MD.                ED BEDSIDE ULTRASOUND:   Performed by ED Physician - none    LABS:    I have reviewed and interpreted all of the currently available lab results from this visit (if applicable):  Results for orders placed or performed during the hospital encounter of 12/12/22   Troponin    Specimen: Blood   Result Value Ref Range    Troponin T <0.010 0.000 - 0.030 ng/mL   Troponin    Specimen: Blood   Result Value Ref Range    Troponin T <0.010 0.000 - 0.030 ng/mL   Comprehensive Metabolic Panel    Specimen: Blood   Result Value Ref Range    Glucose 139 (H) 65 - 99 mg/dL    BUN 9 8 - 23 mg/dL    Creatinine 0.91 0.57 - 1.00 mg/dL    Sodium 136 136 - 145 mmol/L    Potassium 3.5 3.5 - 5.2 mmol/L    Chloride 95 (L) 98 - 107 mmol/L    CO2 28.0 22.0 - 29.0 mmol/L    Calcium 10.4 8.6 - 10.5 mg/dL     Total Protein 7.3 6.0 - 8.5 g/dL    Albumin 4.40 3.50 - 5.20 g/dL    ALT (SGPT) 14 1 - 33 U/L    AST (SGOT) 19 1 - 32 U/L    Alkaline Phosphatase 70 39 - 117 U/L    Total Bilirubin 0.7 0.0 - 1.2 mg/dL    Globulin 2.9 gm/dL    A/G Ratio 1.5 g/dL    BUN/Creatinine Ratio 9.9 7.0 - 25.0    Anion Gap 13.0 5.0 - 15.0 mmol/L    eGFR 66.3 >60.0 mL/min/1.73   Lipase    Specimen: Blood   Result Value Ref Range    Lipase 16 13 - 60 U/L   BNP    Specimen: Blood   Result Value Ref Range    proBNP 140.8 0.0 - 900.0 pg/mL   CBC Auto Differential    Specimen: Blood   Result Value Ref Range    WBC 8.16 3.40 - 10.80 10*3/mm3    RBC 4.91 3.77 - 5.28 10*6/mm3    Hemoglobin 14.7 12.0 - 15.9 g/dL    Hematocrit 43.0 34.0 - 46.6 %    MCV 87.6 79.0 - 97.0 fL    MCH 29.9 26.6 - 33.0 pg    MCHC 34.2 31.5 - 35.7 g/dL    RDW 12.6 12.3 - 15.4 %    RDW-SD 39.8 37.0 - 54.0 fl    MPV 9.6 6.0 - 12.0 fL    Platelets 196 140 - 450 10*3/mm3    Neutrophil % 70.2 42.7 - 76.0 %    Lymphocyte % 19.0 (L) 19.6 - 45.3 %    Monocyte % 7.7 5.0 - 12.0 %    Eosinophil % 1.8 0.3 - 6.2 %    Basophil % 0.9 0.0 - 1.5 %    Immature Grans % 0.4 0.0 - 0.5 %    Neutrophils, Absolute 5.73 1.70 - 7.00 10*3/mm3    Lymphocytes, Absolute 1.55 0.70 - 3.10 10*3/mm3    Monocytes, Absolute 0.63 0.10 - 0.90 10*3/mm3    Eosinophils, Absolute 0.15 0.00 - 0.40 10*3/mm3    Basophils, Absolute 0.07 0.00 - 0.20 10*3/mm3    Immature Grans, Absolute 0.03 0.00 - 0.05 10*3/mm3    nRBC 0.0 0.0 - 0.2 /100 WBC   ECG 12 Lead ED Triage Standing Order; Chest Pain   Result Value Ref Range    QT Interval 398 ms    QTC Interval 492 ms   ECG 12 Lead ED Triage Standing Order; Chest Pain   Result Value Ref Range    QT Interval 422 ms    QTC Interval 471 ms   Green Top (Gel)   Result Value Ref Range    Extra Tube Hold for add-ons.    Lavender Top   Result Value Ref Range    Extra Tube hold for add-on    Gold Top - SST   Result Value Ref Range    Extra Tube Hold for add-ons.    Gray Top   Result Value  Ref Range    Extra Tube Hold for add-ons.    Light Blue Top   Result Value Ref Range    Extra Tube Hold for add-ons.         All other labs were within normal range or not returned as of this dictation.      EMERGENCY DEPARTMENT COURSE and DIFFERENTIAL DIAGNOSIS/MDM:   Vitals:    Vitals:    12/12/22 1100 12/12/22 1200 12/12/22 1210 12/12/22 1230   BP: 135/75 123/79  107/70   Patient Position:    Lying   Pulse:   79 79   Resp:       Temp:       TempSrc:       SpO2: 98% 93% 100% 98%   Weight:       Height:           ED Course as of 12/12/22 1402   Mon Dec 12, 2022   1103 HEART Pathway for Early Discharge in Acute Chest Pain from [facility]  on 12/12/2022  ** All calculations should be rechecked by clinician prior to use **    RESULT SUMMARY:  4 points  HEART Pathway Score    High risk  12-65% 30-day MACE    Admit to hospital or observation. Further testing indicated.      INPUTS:  History -> 0 = Slightly suspicious  EKG -> 0 = Normal  Age -> 2 = =63  Risk factors -> 2 = =3 risk factors or history of atherosclerotic disease  Initial troponin -> 0 = =normal limit   [AP]      ED Course User Index  [AP] Dmitriy Davidson,        This is 72-year-old female with a history of coronary disease who presents with retrosternal epigastric discomfort waxing and waning over the last few days.  She has no significant acute ischemic change on her EKG, she is nontoxic-appearing on examination.  Her vital signs are stable.  We did obtain IV, labs, imaging for further evaluation.  Results reviewed as above.  No acute cardio or pulmonary diseases appreciated on work-up today.  She was seen by cardiology while in the emergency department, appreciate their input.  Negative troponins, unremarkable EKG, recommend following as an outpatient for cardiac issues.  We discussed this could be underlying GI in nature given her acid reflux, pain after eating especially acidic foods.  I feel she would benefit from an EGD for further work-up and  evaluation which can be done as an outpatient.  She is agreeable to this, will continue with symptomatic treatment, bland diet, no spicy foods or acidic foods in the meantime, follow-up with her PCP, GI referral recommended.  Return precautions discussed with the patient.  She will follow-up with her cardiologist as well as discussed.    I had a discussion with the patient/family regarding diagnosis, diagnostic results, treatment plan, and medications.  The patient/family indicated understanding of these instructions.  I spent adequate time at the bedside preceding discharge necessary to personally discuss the aftercare instructions, giving patient education, providing explanations of the results of our evaluations/findings, and my decision making to assure that the patient/family understand the plan of care.  Time was allotted to answer questions at that time and throughout the ED course.  Emphasis was placed on timely follow-up after discharge.  I also discussed the potential for the development of an acute emergent condition requiring further evaluation, admission, or even surgical intervention. I discussed that we found nothing during the visit today indicating the need for further workup, admission, or the presence of an unstable medical condition.  I encouraged the patient to return to the emergency department immediately for ANY concerns, worsening, new complaints, or if symptoms persist and unable to seek follow-up in a timely fashion.  The patient/family expressed understanding and agreement with this plan.  The patient will follow-up with their PCP in 1-2 days for reevaluation.       MEDICATIONS ADMINISTERED IN ED:  Medications   sodium chloride 0.9 % flush 10 mL (has no administration in time range)   aspirin chewable tablet 324 mg (324 mg Oral Not Given 12/12/22 1042)   aluminum-magnesium hydroxide-simethicone (MAALOX MAX) 400-400-40 MG/5ML suspension 15 mL (15 mL Oral Given 12/12/22 1040)   Lidocaine  Viscous HCl (XYLOCAINE) 2 % solution 15 mL (15 mL Mouth/Throat Given 12/12/22 1040)       PROCEDURES:  Procedures    CRITICAL CARE TIME    Total Critical Care time was 0 minutes, excluding separately reportable procedures.   There was a high probability of clinically significant/life threatening deterioration in the patient's condition which required my urgent intervention.      FINAL IMPRESSION      1. Chest pain, unspecified type    2. Epigastric pain          DISPOSITION/PLAN     ED Disposition     ED Disposition   Discharge    Condition   Stable    Comment   --             PATIENT REFERRED TO:  Cesar Funez MD  [address] [facility]  [address]  [place] [id number]  [phone]    Schedule an appointment as soon as possible for a visit       Regan Delgado MD  [address] [facility] [address]  [facility] [id number] [phone]    In 2 days      Brunner, Mark I, MD - GI Specialist  [address] [facility] [address] [place] [address] [phone]  Schedule an appointment as soon as possible for a visit       [facility] Emergency Department  [address] [place] [id number] [phone]    If symptoms worsen      DISCHARGE MEDICATIONS:     Medication List      START taking these medications    famotidine 20 MG tablet  Commonly known as: PEPCID  Take 1 tablet by mouth 2 (Two) Times a Day for 7 days.     naproxen 500 MG tablet  Commonly known as: NAPROSYN     ondansetron ODT 4 MG disintegrating tablet  Commonly known as: ZOFRAN-ODT  Place 1 tablet on the tongue 4 (Four) Times a Day As Needed for Nausea or Vomiting.        CONTINUE taking these medications    amLODIPine 10 MG tablet  Commonly known as: NORVASC     aspirin 81 MG EC tablet     baclofen 10 MG tablet  Commonly known as: LIORESAL     benazepril 40 MG tablet  Commonly known as: LOTENSIN     hydroCHLOROthiazide 25 MG tablet  Commonly known as: HYDRODIURIL     ibuprofen 800 MG tablet  Commonly known as: ADVIL,MOTRIN      latanoprost 0.005 % ophthalmic solution  Commonly known as: XALATAN     metoprolol succinate  MG 24 hr tablet  Commonly known as: TOPROL-XL     multivitamin with minerals tablet tablet     omeprazole 20 MG capsule  Commonly known as: priLOSEC     oxybutynin XL 10 MG 24 hr tablet  Commonly known as: DITROPAN-XL     Restasis 0.05 % ophthalmic emulsion  Generic drug: cycloSPORINE     rosuvastatin 40 MG tablet  Commonly known as: CRESTOR     vitamin E 400 UNIT capsule           Where to Get Your Medications      These medications were sent to [facility] [id number] - [facility] [address] [facility] [phone]  - [phone]   [address] [facility] [address]    Phone: [phone]   · famotidine 20 MG tablet  · ondansetron ODT 4 MG disintegrating tablet             Comment: Please note this report has been produced using speech recognition software.      Dmitriy Davidson DO  Attending Emergency Physician               Dmitriy Davidson,   12/12/22 1402       Dmitriy Davidson,   12/12/22 1409

## 2022-12-12 NOTE — CONSULTS
Eneida Bartholomew  [id number]  1950   LOS: 0 days   Patient Care Team:  PHYSICIAN: Regan Delgado MD   CARDIOLOGIST: Cesar Funez MD    Ms. Bartholomew is a 72-year-old  white female from [place], [place].    Chief Complaint: Epigastric pain    Problem List:  1. Coronary artery disease:  a. 2005, abnormal Cardiolite.  b. Cardiac catheterization, Dr. Daniel with 75% to 80% mid LAD, 20% proximal circumflex, 25% proximal RCA with a small AV fistula from the first diagonal to the coronary sinus.  c. 2005, LAD stenting with a 3.0 x 28 mm Cypher JORDYN by Dr. Tyson Pulliam.  d. 10/14/2020 [facility] for high risk myocardial perfusion study.  Dr. Vidales.  Patent stent in the LAD.  Diagonal free of disease.  Circumflex normal.  RCA 40 to 50% proximal stenosis, normal IFR.  2. Hypertension  a. Echocardiogram, 12/2014 with an EF of 65% to 70%, mild tricuspid regurgitation and mild concentric LVH.    3. Dyslipidemia.  4. COVID-19 9/2021;Did not require hospitalization.  Received infusion therapy.  5. RBBB  6. GERD.  7. History of peptic ulcer disease.  8. Overactive bladder.  9. Arthritis.  10. Anxiety.  11. Fibromyalgia.  12. Diverticulosis  13. Surgeries:  a. Cholecystectomy.  b. Right foot surgery x3.  c. Left foot surgery x3.  d. Remote lumpectomy, reportedly benign.  e. Cataract extraction  f. Lumbar laminectomy   g. Knee surgery  h. Back surgery    Allergies   Allergen Reactions   • Prednisone Shortness Of Breath     And nervous   • Shellfish-Derived Products Anaphylaxis, Shortness Of Breath, Swelling and Rash     ALL SEAFOOD, FISH   • Erythromycin Rash   • Sulfa Antibiotics Rash     (Not in a hospital admission)    Scheduled Meds:aspirin, 324 mg, Oral, Once      Continuous Infusions:   PRN Meds:.•  sodium chloride       History of Present Illness:   This is a 72-year-old white female who presented to [facility] ED with retrosternal epigastric pain/discomfort.  The patient has had a burning sensation which has  been relatively constant for the last 6 days.  She has some associated chest pressure.  No vomiting.  No melena or hematochezia.  She has a history of reflux but has been compliant with her Prilosec.  She denied any nausea, vomiting, fever, chills,  palpitations, dizziness, presyncope, or syncope.  She had diarrhea last night.  She says that whenever she eats her symptoms worsen.  It tends to be worse with foods that contain tomato or acidic foods.  Also worse with lying down.  She has never had an EGD before and does not have a gastroenterologist.  She says that she has went through an entire bottle of Tums.  She also has associated bloating.  She will occasionally have a little shortness of breath.  She was told remotely of having an ulcer.  ECG in the ED showed sinus rhythm with PACs, and a right bundle branch block which is chronic.  Labs unremarkable except glucose 139.  She was last seen in office with Dr. Funez November 2022 with recommendations for continued treatment.  Her last heart catheterization was in 2020 showing a patent stent in the LAD, diagonal free of disease, normal circumflex, RCA 40 to 50% proximal stenosis, normal IFR.  The patient says that this week she has lost several pounds due to not being able to eat without having epigastric pain.  She was given a GI cocktail in the ED with some improvement. She has had a cholecystectomy.      Cardiac risk factors: advanced age (older than 55 for men, 65 for women), dyslipidemia, family history of premature cardiovascular disease, hypertension and sedentary lifestyle.    Social History     Socioeconomic History   • Marital status:      Spouse name: Claude   Tobacco Use   • Smoking status: Never   • Smokeless tobacco: Never   Vaping Use   • Vaping Use: Never used   Substance and Sexual Activity   • Alcohol use: No   • Drug use: No   • Sexual activity: Defer     Family History   Problem Relation Age of Onset   • Cancer Mother    • Heart disease  "Father        Review of Systems  10 point review of systems was completed, positives outlined in the HPI, and otherwise all other systems are negative.      Objective:       Physical Exam  /91   Pulse 82   Temp 97.8 °F (36.6 °C) (Oral)   Resp 18   Ht 160 cm (63\")   Wt 75.3 kg (166 lb)   SpO2 96%   BMI 29.41 kg/m²       12/12/22  0952   Weight: 75.3 kg (166 lb)     Body mass index is 29.41 kg/m².  No intake or output data in the 24 hours ending 12/12/22 1128    General Appearance:  Alert, cooperative, no distress, appears stated age   Head:  Normocephalic, without obvious abnormality, atraumatic   Neck: Supple, symmetrical, trachea midline, no adenopathy, thyroid: not enlarged, symmetric, no tenderness/mass/nodules, no carotid bruit or JVD   Lungs:   Clear to auscultation bilaterally, respirations unlabored   Heart:  Regular rate and rhythm, S1, S2 normal, no murmur, rub or gallop   Abdomen:   Soft, nontender, no masses, no organomegaly, bowel sounds audible x4   Extremities: No edema, normal range of motion   Pulses: 2+ and symmetric   Skin: Skin color, texture, turgor normal, no rashes or lesions   Neurologic: Normal       Cardiographics  EKG 12/12/2022:  Sinus rhythm with premature atrial complexes  Right bundle branch block  Abnormal ECG  When compared with ECG of 29-APR-2019 10:24,  premature atrial complexes are now present  Right bundle branch block has replaced Nonspecific intraventricular block  Confirmed by PADMA WEST MD ([id number]) on 12/12/2022 10:15:00 AM    Imaging  Chest x-ray 12/12/2022:  No acute cardiopulmonary findings.    Lab Review   Results from last 7 days   Lab Units 12/12/22  1000   SODIUM mmol/L 136   POTASSIUM mmol/L 3.5   CHLORIDE mmol/L 95*   CO2 mmol/L 28.0   BUN mg/dL 9   CREATININE mg/dL 0.91   GLUCOSE mg/dL 139*   CALCIUM mg/dL 10.4     Results from last 7 days   Lab Units 12/12/22  1000   WBC 10*3/mm3 8.16   HEMOGLOBIN g/dL 14.7   HEMATOCRIT % 43.0   PLATELETS 10*3/mm3 " 196             Results from last 7 days   Lab Units 12/12/22  1000   TROPONIN T ng/mL <0.010   proBNP 140.8  Lipase 16      Assessment:   Patient with intermittent burning epigastric pain with unremarkable labs and troponin negative x1. If next troponin negative, would recommend gastroenterology consultation. This does not sound like ACS.           Plan:   1.  Trend troponin   2.  If next troponin negative, would recommend gastroenterology consultation  3. May consider OP stress test after GI workup completed  4. Would consider switching prilosec to protonix 40mg daily      Scribed for Aman Woods MD by AMANDA Aviles. 12/12/2022  11:57 EST    Patient was seen and examined in a face-to-face encounter.  She definitely has some features more suggestive of a GI etiology of her discomfort including worsening after eating worsening with acidic foods worsening with lying down.  Her discomfort has been relatively constant over 6 days and she is ruled out for ACS with negative serial troponins.  Discussed with Dr. Davidson we will first investigate GI etiologies for her discomfort, including potentially with EGD.  If unrevealing then she could benefit from repeat cardiac stress testing but I think this could be done on the outpatient basis given her negative work-up in the ED today.  He was in agreement.    I, Calixto Woods M.D.,  have reviewed the notes, assessments, and/or procedures performed by AMANDA/PA, I CONCUR with the documentation of Eneida Bartholomew.

## 2022-12-22 ENCOUNTER — TRANSCRIBE ORDERS (OUTPATIENT)
Dept: ADMINISTRATIVE | Facility: HOSPITAL | Age: 72
End: 2022-12-22

## 2022-12-22 DIAGNOSIS — M79.2 NEURITIS: ICD-10-CM

## 2022-12-22 DIAGNOSIS — M79.2 NEURALGIA: Primary | ICD-10-CM

## 2022-12-28 ENCOUNTER — TRANSCRIBE ORDERS (OUTPATIENT)
Dept: ADMINISTRATIVE | Facility: HOSPITAL | Age: 72
End: 2022-12-28
Payer: MEDICARE

## 2022-12-28 DIAGNOSIS — G62.9 PERIPHERAL NERVE DISORDER: Primary | ICD-10-CM

## 2023-01-13 ENCOUNTER — OFFICE VISIT (OUTPATIENT)
Dept: NEUROSURGERY | Facility: CLINIC | Age: 73
End: 2023-01-13
Payer: MEDICARE

## 2023-01-13 ENCOUNTER — HOSPITAL ENCOUNTER (OUTPATIENT)
Dept: GENERAL RADIOLOGY | Facility: HOSPITAL | Age: 73
Discharge: HOME | End: 2023-01-13
Admitting: NEUROLOGICAL SURGERY
Payer: MEDICARE

## 2023-01-13 ENCOUNTER — TELEPHONE (OUTPATIENT)
Dept: NEUROSURGERY | Facility: CLINIC | Age: 73
End: 2023-01-13

## 2023-01-13 VITALS — WEIGHT: 169.4 LBS | BODY MASS INDEX: 30.02 KG/M2 | HEIGHT: 63 IN | TEMPERATURE: 97.1 F

## 2023-01-13 DIAGNOSIS — Z98.1 STATUS POST LUMBAR SPINAL FUSION: Primary | ICD-10-CM

## 2023-01-13 DIAGNOSIS — M48.062 LUMBAR STENOSIS WITH NEUROGENIC CLAUDICATION: ICD-10-CM

## 2023-01-13 DIAGNOSIS — Z98.1 STATUS POST LUMBAR SPINAL FUSION: ICD-10-CM

## 2023-01-13 PROCEDURE — 99213 OFFICE O/P EST LOW 20 MIN: CPT | Performed by: NEUROLOGICAL SURGERY

## 2023-01-13 PROCEDURE — 72100 X-RAY EXAM L-S SPINE 2/3 VWS: CPT

## 2023-01-13 RX ORDER — PANTOPRAZOLE SODIUM 40 MG/1
TABLET, DELAYED RELEASE ORAL
COMMUNITY
Start: 2022-12-22

## 2023-01-13 RX ORDER — AMITRIPTYLINE HYDROCHLORIDE 25 MG/1
TABLET, FILM COATED ORAL
COMMUNITY
Start: 2022-10-28

## 2023-01-13 NOTE — PROGRESS NOTES
Patient: Eneida Bartholomew  : 1950    Primary Care Provider: Regan Delgado MD    Requesting Provider: As above        History    Chief Complaint: Low back and left thigh pain with walking and standing intolerance.    History of Present Illness: Ms. Bartholomew is a 72-year-old woman who is known to our service.  On 2019 she underwent right L3-4 foraminotomy as well as L4-5 PLIF.  She did well but developed recurrent symptoms and as such on 1/3/2022 she underwent PLIF extension to the L3-4 level with foraminotomy on the left at L3-4.  Since surgery her thigh pain has been absent.  She only complains of a bit of a heaviness in her back that occurs with protracted standing.  She does better with walking.    Review of Systems   Constitutional: Negative for activity change, appetite change, chills, diaphoresis, fatigue, fever and unexpected weight change.   HENT: Negative for congestion, dental problem, drooling, ear discharge, ear pain, facial swelling, hearing loss, mouth sores, nosebleeds, postnasal drip, rhinorrhea, sinus pressure, sinus pain, sneezing, sore throat, tinnitus, trouble swallowing and voice change.    Eyes: Positive for photophobia. Negative for pain, discharge, redness, itching and visual disturbance.   Respiratory: Negative for apnea, cough, choking, chest tightness, shortness of breath, wheezing and stridor.    Cardiovascular: Negative for chest pain, palpitations and leg swelling.   Gastrointestinal: Negative for abdominal distention, abdominal pain, anal bleeding, blood in stool, constipation, diarrhea, nausea, rectal pain and vomiting.   Endocrine: Negative for cold intolerance, heat intolerance, polydipsia, polyphagia and polyuria.   Genitourinary: Negative for decreased urine volume, difficulty urinating, dyspareunia, dysuria, enuresis, flank pain, frequency, genital sores, hematuria, menstrual problem, pelvic pain, urgency, vaginal bleeding, vaginal discharge and vaginal pain.  "  Musculoskeletal: Positive for back pain. Negative for arthralgias, gait problem, joint swelling, myalgias, neck pain and neck stiffness.   Skin: Negative for color change, pallor, rash and wound.   Allergic/Immunologic: Positive for food allergies. Negative for environmental allergies and immunocompromised state.   Neurological: Negative for dizziness, tremors, seizures, syncope, facial asymmetry, speech difficulty, weakness, light-headedness, numbness and headaches.   Hematological: Negative for adenopathy. Does not bruise/bleed easily.   Psychiatric/Behavioral: Negative for agitation, behavioral problems, confusion, decreased concentration, dysphoric mood, hallucinations, self-injury, sleep disturbance and suicidal ideas. The patient is not nervous/anxious and is not hyperactive.    All other systems reviewed and are negative.      The patient's past medical history, past surgical history, family history, and social history have been reviewed at length in the electronic medical record.      Physical Exam:   Temp 97.1 °F (36.2 °C) (Infrared)   Ht 160 cm (62.99\")   Wt 76.8 kg (169 lb 6.4 oz)   BMI 30.02 kg/m²   The patient is limping a little bit but has a bad right foot.    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    Treatment Options:   The patient was supposed to get x-rays today and that did not get accomplished.  I am going to order plain films of her back to assess her fusion.  She will let us know when those have been accomplished so we can review them.  If those are in order then she will follow-up on an as-needed basis.       Diagnosis Plan   1. Status post lumbar spinal fusion        2. Lumbar stenosis with neurogenic claudication            Scribed for Yoni Borja MD by Virgen Mcbride [facility] 1/13/2023 12:24 EST      I, Dr. Borja, personally performed the services " described in the documentation, as scribed in my presence, and it is both accurate and complete.

## 2023-01-13 NOTE — TELEPHONE ENCOUNTER
S/w patient and relayed PA's message. Patient verbalized understanding and was thankful for the call back.

## 2023-01-13 NOTE — TELEPHONE ENCOUNTER
From a surgical standpoint, her xrays look good and she can follow up on an as needed basis. Please advise.

## 2023-01-13 NOTE — TELEPHONE ENCOUNTER
Caller: Eneida Bartholomew    Relationship to patient: Self    Best call back number: [phone]     Patient is needing:     REC'D CALL FROM PATIENT LETTING US KNOW THAT SHE HAD HER XRAY DONE AT [facility].    PLEASE CALL TO ADVISE

## 2023-04-04 NOTE — PROGRESS NOTES
"NEUROSURGERY PROGRESS NOTE     LOS: 2 days   Patient Care Team:  Cari Rodriguez MD as PCP - General (Internal Medicine)  Cari Rodriguez MD as Referring Physician (Internal Medicine)    Chief Complaint: Low back and left thigh pain with walking and standing intolerance.    POD#: 2 Days Post-Op  Procedures:  L3-4 PLIF extension.  Left L2-3 foraminotomy and discectomy.    Interval History:   The patient ambulated in the baron yesterday.  Patient Complaints: Incisional pain.  Patient Denies: Preoperative leg pain and claudication symptoms.    Vital Signs: Blood pressure 112/61, pulse 78, temperature 98.1 °F (36.7 °C), temperature source Oral, resp. rate 18, height 160 cm (63\"), weight 79.8 kg (176 lb), SpO2 94 %, not currently breastfeeding.  Intake/Output:     Intake/Output Summary (Last 24 hours) at 1/5/2022 0610  Last data filed at 1/5/2022 0444  Gross per 24 hour   Intake 550 ml   Output 1100 ml   Net -550 ml     Drain output: 50 mL.    Physical Exam:  The patient is awake and alert.  She follows all commands.  Dry dressing is in place on her incision.  Apparently it was changed last evening.     Data Review:    Results from last 7 days   Lab Units 01/04/22  0852 12/30/21  1248 12/30/21  1248   WBC 10*3/mm3  --   --  5.95   HEMOGLOBIN g/dL 11.0*   < > 14.0   HEMATOCRIT % 33.2*   < > 40.3   PLATELETS 10*3/mm3  --   --  259    < > = values in this interval not displayed.       Assessment/Plan:  1.  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  2.  Left L2-3 recess and foraminal stenosis with disc herniation status post decompression.  3.  History of hypertension.  4.  History of coronary artery disease.  5.  Disposition: Mobilize patient.  I anticipate that she will be discharged home tomorrow with home health for PT and OT.      Yoni Borja MD  01/05/22  06:10 EST    " 5-Fu Counseling: 5-Fluorouracil Counseling:  I discussed with the patient the risks of 5-fluorouracil including but not limited to erythema, scaling, itching, weeping, crusting, and pain.

## 2023-06-06 ENCOUNTER — TELEPHONE (OUTPATIENT)
Dept: CARDIOLOGY | Facility: CLINIC | Age: 73
End: 2023-06-06
Payer: MEDICARE

## 2023-06-06 NOTE — TELEPHONE ENCOUNTER
Requesting pre op risk assessment for foot surgery at [facility]    Last office visit 11/01/2022:      Diagnoses and all orders for this visit:     1. Coronary artery disease involving native coronary artery of native heart without angina pectoris (Primary), stable.  No angina.  On aspirin     2. Primary hypertension, controlled.  On beta-blocker, amlodipine.     3. Pure hypercholesterolemia, stable.  On statin.  Labs with primary care.        Plan:  Continue current cardiac medications.  Attempt to obtain labs from primary care.  Follow-up in 1 years time or sooner if needed.

## 2024-02-20 NOTE — PROGRESS NOTES
Subjective:     Encounter Date:02/21/2024    Primary Care Physician: Regan Delgado MD      Patient ID: Eneida Bartholomew is a 73 y.o. female.    Chief Complaint:Coronary Artery Disease      PROBLEM LIST:  Coronary artery disease:  2005, abnormal Cardiolite.  Cardiac catheterization, Dr. Daniel with 75% to 80% mid LAD, 20% proximal circumflex, 25% proximal RCA with a small AV fistula from the first diagonal to the coronary sinus.  2005, LAD stenting with a 3.0 x 28 mm Cypher JORDYN by Dr. Tyson Pulliam.  10/14/2020 [facility] for high risk myocardial perfusion study.  Dr. Vidales.  Patent stent in the LAD.  Diagonal free of disease.  Circumflex normal.  RCA 40 to 50% proximal stenosis, normal IFR.  Hypertension.  Echo, 12/2014 with an EF of 65% to 70%, mild tricuspid regurgitation and mild concentric LVH.    Dyslipidemia.  COVID-19 9/2021  Did not require hospitalization.  Received infusion therapy.  RBBB  GERD.  History of peptic ulcer disease.  Overactive bladder.  Arthritis.  Anxiety.  Fibromyalgia.  Diverticulosis  Surgeries:  Cholecystectomy.  Right foot surgery x5.  Left foot surgery x3.  Remote lumpectomy, reportedly benign.  Cataract extraction  Lumbar laminectomy   Knee surgery  Back surgery        Allergies   Allergen Reactions    Prednisone Shortness Of Breath     And nervous    Shellfish-Derived Products Anaphylaxis, Shortness Of Breath, Swelling and Rash     ALL SEAFOOD, FISH    Erythromycin Rash    Sulfa Antibiotics Rash         Current Outpatient Medications:     amitriptyline (ELAVIL) 25 MG tablet, , Disp: , Rfl:     amLODIPine (NORVASC) 10 MG tablet, Take 1 tablet by mouth Daily., Disp: , Rfl:     aspirin 81 MG EC tablet, Take 1 tablet by mouth Daily., Disp: , Rfl:     baclofen (LIORESAL) 10 MG tablet, 1 tablet 3 (Three) Times a Day As Needed., Disp: , Rfl:     benazepril (LOTENSIN) 40 MG tablet, Take 1 tablet by mouth Daily., Disp: , Rfl:     Cholecalciferol 25 MCG (1000 UT) tablet, Take 1 tablet  by mouth Daily., Disp: , Rfl:     Flaxseed, Linseed, (FLAX SEED OIL PO), Take  by mouth., Disp: , Rfl:     hydroCHLOROthiazide (HYDRODIURIL) 25 MG tablet, Take 1 tablet by mouth Daily., Disp: , Rfl:     latanoprost (XALATAN) 0.005 % ophthalmic solution, , Disp: , Rfl:     metoprolol succinate XL (TOPROL-XL) 100 MG 24 hr tablet, 1 tablet Daily., Disp: , Rfl:     Multiple Vitamins-Minerals (MULTIVITAL PO), Take 1 tablet by mouth Daily., Disp: , Rfl:     naproxen (NAPROSYN) 500 MG tablet, , Disp: , Rfl:     ondansetron ODT (ZOFRAN-ODT) 4 MG disintegrating tablet, Place 1 tablet on the tongue 4 (Four) Times a Day As Needed for Nausea or Vomiting., Disp: 15 tablet, Rfl: 0    oxybutynin XL (DITROPAN-XL) 10 MG 24 hr tablet, Take 1 tablet by mouth Daily., Disp: , Rfl:     pantoprazole (PROTONIX) 40 MG EC tablet, , Disp: , Rfl:     Restasis 0.05 % ophthalmic emulsion, Administer 1 drop to both eyes Every 12 (Twelve) Hours., Disp: , Rfl:     rosuvastatin (CRESTOR) 40 MG tablet, Take 1 tablet by mouth Every Night., Disp: , Rfl:     vitamin C (ASCORBIC ACID) 250 MG tablet, Take 1 tablet by mouth Daily., Disp: , Rfl:     vitamin E 400 UNIT capsule, Take 1 capsule by mouth Daily., Disp: , Rfl:         History of Present Illness    Patient returns today for annual follow-up of coronary disease since her last visit she is doing very well.  She then underwent podiatric surgery without a cardiovascular event.  Denies exertional chest pain dyspnea orthopnea PND claudication or any cardiovascular symptoms.  Is not active and is limited due to her podiatry issues    The following portions of the patient's history were reviewed and updated as appropriate: allergies, current medications, past family history, past medical history, past social history, past surgical history and problem list.      Social History     Tobacco Use    Smoking status: Never    Smokeless tobacco: Never   Vaping Use    Vaping Use: Never used   Substance Use Topics  "   Alcohol use: No    Drug use: No         ROS       Objective:   /82   Pulse 69   Ht 160 cm (63\")   Wt 77.1 kg (170 lb)   SpO2 96%   BMI 30.11 kg/m²         Vitals reviewed.   Constitutional:       Appearance: Well-developed and not in distress.   Neck:      Thyroid: No thyromegaly.      Vascular: No carotid bruit or JVD.   Pulmonary:      Breath sounds: Normal breath sounds.   Cardiovascular:      Regular rhythm.      No gallop. No S3 and S4 gallop.   Pulses:     Intact distal pulses.      Carotid: 2+ bilaterally.     Radial: 2+ bilaterally.  Edema:     Peripheral edema absent.   Abdominal:      General: Bowel sounds are normal.      Palpations: Abdomen is soft. There is no abdominal mass.      Tenderness: There is no abdominal tenderness.   Musculoskeletal:         General: No deformity.      Extremities: No clubbing present.Skin:     General: Skin is warm and dry.      Findings: No rash.   Neurological:      Mental Status: Alert and oriented to person, place, and time.         Procedures          Assessment:   Assessment & Plan      Diagnoses and all orders for this visit:    1. Coronary artery disease involving native coronary artery of native heart without angina pectoris (Primary)      1.  Coronary artery disease, nonflow-limiting most recent cath 3-1/2 years ago.  No angina though poor functional capacity due to podiatry issues  2.  Hypertension well-controlled on 4 drugs  3.  Dyslipidemia on high-dose statin    Recommendations:  1.  Continue current medical therapy  2.  Encourage exercise as tolerated  3.  Rec adenosine myocardial perfusion study prior to next visit next year         Advance Care Planning   ACP discussion was held with the patient during this visit. Patient has an advance directive in EMR which is still valid.       Cesar Funez MD    Dictated utilizing Dragon dictation  "

## 2024-02-21 ENCOUNTER — OFFICE VISIT (OUTPATIENT)
Dept: CARDIOLOGY | Facility: CLINIC | Age: 74
End: 2024-02-21
Payer: MEDICARE

## 2024-02-21 VITALS
HEIGHT: 63 IN | WEIGHT: 170 LBS | OXYGEN SATURATION: 96 % | BODY MASS INDEX: 30.12 KG/M2 | HEART RATE: 69 BPM | SYSTOLIC BLOOD PRESSURE: 144 MMHG | DIASTOLIC BLOOD PRESSURE: 82 MMHG

## 2024-02-21 DIAGNOSIS — I25.10 CORONARY ARTERY DISEASE INVOLVING NATIVE CORONARY ARTERY OF NATIVE HEART WITHOUT ANGINA PECTORIS: Primary | ICD-10-CM

## 2024-02-21 DIAGNOSIS — I25.10 CORONARY ARTERY DISEASE INVOLVING NATIVE CORONARY ARTERY OF NATIVE HEART, UNSPECIFIED WHETHER ANGINA PRESENT: Primary | ICD-10-CM

## 2024-02-21 PROCEDURE — 1159F MED LIST DOCD IN RCRD: CPT | Performed by: INTERNAL MEDICINE

## 2024-02-21 PROCEDURE — 99214 OFFICE O/P EST MOD 30 MIN: CPT | Performed by: INTERNAL MEDICINE

## 2024-02-21 PROCEDURE — 3079F DIAST BP 80-89 MM HG: CPT | Performed by: INTERNAL MEDICINE

## 2024-02-21 PROCEDURE — 1160F RVW MEDS BY RX/DR IN RCRD: CPT | Performed by: INTERNAL MEDICINE

## 2024-02-21 PROCEDURE — 3077F SYST BP >= 140 MM HG: CPT | Performed by: INTERNAL MEDICINE

## 2024-02-21 RX ORDER — MELATONIN
1000 DAILY
COMMUNITY

## 2024-02-21 RX ORDER — MULTIVIT WITH MINERALS/LUTEIN
250 TABLET ORAL DAILY
COMMUNITY

## 2024-05-01 ENCOUNTER — OFFICE VISIT (OUTPATIENT)
Dept: NEUROSURGERY | Facility: CLINIC | Age: 74
End: 2024-05-01
Payer: MEDICARE

## 2024-05-01 VITALS
HEIGHT: 63 IN | WEIGHT: 180.5 LBS | DIASTOLIC BLOOD PRESSURE: 84 MMHG | SYSTOLIC BLOOD PRESSURE: 120 MMHG | BODY MASS INDEX: 31.98 KG/M2 | TEMPERATURE: 97.7 F

## 2024-05-01 DIAGNOSIS — M51.36 DISC DEGENERATION, LUMBAR: ICD-10-CM

## 2024-05-01 DIAGNOSIS — M48.062 LUMBAR STENOSIS WITH NEUROGENIC CLAUDICATION: ICD-10-CM

## 2024-05-01 DIAGNOSIS — M48.062 SPINAL STENOSIS, LUMBAR REGION, WITH NEUROGENIC CLAUDICATION: Primary | ICD-10-CM

## 2024-05-01 PROCEDURE — 3079F DIAST BP 80-89 MM HG: CPT | Performed by: PHYSICIAN ASSISTANT

## 2024-05-01 PROCEDURE — 1160F RVW MEDS BY RX/DR IN RCRD: CPT | Performed by: PHYSICIAN ASSISTANT

## 2024-05-01 PROCEDURE — 99214 OFFICE O/P EST MOD 30 MIN: CPT | Performed by: PHYSICIAN ASSISTANT

## 2024-05-01 PROCEDURE — 3074F SYST BP LT 130 MM HG: CPT | Performed by: PHYSICIAN ASSISTANT

## 2024-05-01 PROCEDURE — 1159F MED LIST DOCD IN RCRD: CPT | Performed by: PHYSICIAN ASSISTANT

## 2024-05-01 RX ORDER — HYDROCODONE BITARTRATE AND ACETAMINOPHEN 5; 325 MG/1; MG/1
TABLET ORAL
COMMUNITY
Start: 2023-12-13

## 2024-05-01 NOTE — PROGRESS NOTES
Patient: Eneida Bartholomew  : 1950  Chart #: [id number]    Date of Service: 2024    CHIEF COMPLAINT: Low back and left thigh pain with walking and standing intolerance    History of Present Illness Mr. Bartholomew is a 73-year-old woman who is known to Dr. Borja service.  On 2019 she underwent right L3-4 foraminotomies as well as L4-5 PLIF.  She did well but developed recurrent symptoms and on 1/3/2022 she underwent PLIF extension to the L3-4 level with foraminotomies on the left at L3-4.  Once again, she did really well postoperatively.  Over the last 6 months she has developed progressive low back pain that extends into the lower extremities.  Symptoms are worse with prolonged standing and walking.  Pain will diminish when sitting and goes away completely when lying down.  Her activities have become very limited.  She is fearful of leaving the house by herself.  She is unable to do her normal activities of daily living due to her standing and walking limitations.  She tried some simple exercises at home but cannot complete.      Past Medical History:   Diagnosis Date    Arthritis     Arthritis     Bronchitis     Chicken pox     Coronary artery disease     Fibromyalgia     GERD (gastroesophageal reflux disease)     Glaucoma     Glaucoma     History of cardiac cath     History of echocardiogram     History of Holter monitoring     History of migraine     History of PTCA     History of stress test     Hyperlipidemia     Hypertension     Measles     Stomach ulcer     UTI (urinary tract infection)          Current Outpatient Medications:     amLODIPine (NORVASC) 10 MG tablet, Take 1 tablet by mouth Daily., Disp: , Rfl:     aspirin 81 MG EC tablet, Take 1 tablet by mouth Daily., Disp: , Rfl:     baclofen (LIORESAL) 10 MG tablet, 1 tablet 3 (Three) Times a Day As Needed., Disp: , Rfl:     benazepril (LOTENSIN) 40 MG tablet, Take 1 tablet by mouth Daily., Disp: , Rfl:     Cholecalciferol 25 MCG (1000 UT)  tablet, Take 1 tablet by mouth Daily., Disp: , Rfl:     Flaxseed, Linseed, (FLAX SEED OIL PO), Take  by mouth., Disp: , Rfl:     hydroCHLOROthiazide (HYDRODIURIL) 25 MG tablet, Take 1 tablet by mouth Daily., Disp: , Rfl:     HYDROcodone-acetaminophen (NORCO) 5-325 MG per tablet, Take 1 tablet every 4 hours by oral route as needed for 5 days., Disp: , Rfl:     latanoprost (XALATAN) 0.005 % ophthalmic solution, , Disp: , Rfl:     metoprolol succinate XL (TOPROL-XL) 100 MG 24 hr tablet, 1 tablet Daily., Disp: , Rfl:     Multiple Vitamins-Minerals (MULTIVITAL PO), Take 1 tablet by mouth Daily., Disp: , Rfl:     ondansetron ODT (ZOFRAN-ODT) 4 MG disintegrating tablet, Place 1 tablet on the tongue 4 (Four) Times a Day As Needed for Nausea or Vomiting., Disp: 15 tablet, Rfl: 0    oxybutynin XL (DITROPAN-XL) 10 MG 24 hr tablet, Take 1 tablet by mouth Daily., Disp: , Rfl:     pantoprazole (PROTONIX) 40 MG EC tablet, , Disp: , Rfl:     rosuvastatin (CRESTOR) 40 MG tablet, Take 1 tablet by mouth Every Night., Disp: , Rfl:     vitamin C (ASCORBIC ACID) 250 MG tablet, Take 1 tablet by mouth Daily., Disp: , Rfl:     vitamin E 400 UNIT capsule, Take 1 capsule by mouth Daily., Disp: , Rfl:     Past Surgical History:   Procedure Laterality Date    BREAST BIOPSY Right     BUNIONECTOMY Bilateral     x4    CATARACT EXTRACTION EXTRACAPSULAR W/ INTRAOCULAR LENS IMPLANTATION Bilateral     CHOLECYSTECTOMY      CORONARY ANGIOPLASTY WITH STENT PLACEMENT      EYE SURGERY Bilateral     CATARACTS REMOVED    KNEE ARTHROSCOPY Right 2021    LUMBAR LAMINECTOMY WITH FUSION N/A 5/2/2019    Procedure: LUMBAR FUSION DECOMPRESSON WITH PEDICLE SCREWS L4-5, LAMINECTOMY, FORAMINOTOMY RIGHT L3-4;  Surgeon: Yoni Borja MD;  Location: [facility];  Service: Neurosurgery    LUMBAR LAMINECTOMY WITH FUSION N/A 1/3/2022    Procedure: LUMBAR FUSION extension DECOMPRESSON WITH PEDICLE SCREWS L3-4, with exploration L2-3, left, discectomy;  Surgeon: Jonh  Yoni REINA MD;  Location: [facility];  Service: Neurosurgery;  Laterality: N/A;       Social History     Socioeconomic History    Marital status:      Spouse name: Claude   Tobacco Use    Smoking status: Never    Smokeless tobacco: Never   Vaping Use    Vaping status: Never Used   Substance and Sexual Activity    Alcohol use: No    Drug use: No    Sexual activity: Defer         Review of Systems   Constitutional:  Positive for fatigue. Negative for activity change, appetite change, chills, diaphoresis, fever and unexpected weight change.   HENT:  Positive for sinus pressure. Negative for congestion, dental problem, drooling, ear discharge, ear pain, facial swelling, hearing loss, mouth sores, nosebleeds, postnasal drip, rhinorrhea, sinus pain, sneezing, sore throat, tinnitus, trouble swallowing and voice change.    Eyes:  Positive for photophobia. Negative for pain, discharge, redness, itching and visual disturbance.   Respiratory:  Negative for apnea, cough, choking, chest tightness, shortness of breath, wheezing and stridor.    Cardiovascular:  Negative for chest pain, palpitations and leg swelling.   Gastrointestinal:  Negative for abdominal distention, abdominal pain, anal bleeding, blood in stool, constipation, diarrhea, nausea, rectal pain and vomiting.   Endocrine: Negative for cold intolerance, heat intolerance, polydipsia, polyphagia and polyuria.   Genitourinary:  Negative for decreased urine volume, difficulty urinating, dyspareunia, dysuria, enuresis, flank pain, frequency, genital sores, hematuria, menstrual problem, pelvic pain, urgency, vaginal bleeding, vaginal discharge and vaginal pain.   Musculoskeletal:  Positive for arthralgias and back pain. Negative for gait problem, joint swelling, myalgias, neck pain and neck stiffness.   Skin:  Negative for color change, pallor, rash and wound.   Allergic/Immunologic: Positive for food allergies. Negative for environmental allergies and  "immunocompromised state.   Neurological:  Positive for weakness. Negative for dizziness, tremors, seizures, syncope, facial asymmetry, speech difficulty, light-headedness, numbness and headaches.   Hematological:  Negative for adenopathy. Does not bruise/bleed easily.   Psychiatric/Behavioral:  Negative for agitation, behavioral problems, confusion, decreased concentration, dysphoric mood, hallucinations, self-injury, sleep disturbance and suicidal ideas. The patient is not nervous/anxious and is not hyperactive.        Objective   Vital Signs: Blood pressure 120/84, temperature 97.7 °F (36.5 °C), temperature source Infrared, height 160 cm (63\"), weight 81.9 kg (180 lb 8 oz), not currently breastfeeding.  Physical Exam  Vitals and nursing note reviewed.   Constitutional:       General: She is not in acute distress.     Appearance: She is well-developed.   HENT:      Head: Normocephalic and atraumatic.   Psychiatric:         Behavior: Behavior normal.         Thought Content: Thought content normal.     Musculoskeletal:     Strength is intact in upper and lower extremities to direct testing.     Station and gait are normal.     Straight leg raising is negative.   Neurologic:     Muscle tone is normal throughout.     Coordination is intact.     Deep tendon reflexes: 2+ and symmetrical.     Sensation is intact to light touch throughout.     Patient is oriented to person, place, and time.         Independent review of radiographic imaging: No recent studies    Assessment & Plan   Diagnosis:  Transition syndrome  History of PLIF L3-4 and L4-5    Medical Decision Making: Patient presents with new low back and leg symptoms refractory to time and nonoperative measures.  I have referred her for an MRI lumbar spine without gadolinium and flexion-extension plain films.  She will follow-up with me in further recommendations will made at that time    Diagnoses and all orders for this visit:    1. Spinal stenosis, lumbar region, " with neurogenic claudication (Primary)  -     MRI Lumbar Spine With & Without Contrast; Future  -     XR spine lumbar flex and ext; Future  -     Ambulatory Referral to Physical Therapy    2. Disc degeneration, lumbar  -     MRI Lumbar Spine With & Without Contrast; Future  -     XR spine lumbar flex and ext; Future  -     Ambulatory Referral to Physical Therapy                        Ave Chanel PA-C  Patient Care Team:  Regan Delgado MD as PCP - General (Internal Medicine)  Cari Rodriguez MD as Referring Physician (Internal Medicine)

## 2024-05-20 ENCOUNTER — HOSPITAL ENCOUNTER (OUTPATIENT)
Dept: GENERAL RADIOLOGY | Facility: HOSPITAL | Age: 74
Discharge: HOME | End: 2024-05-20
Payer: MEDICARE

## 2024-05-20 ENCOUNTER — HOSPITAL ENCOUNTER (OUTPATIENT)
Dept: MRI IMAGING | Facility: HOSPITAL | Age: 74
Discharge: HOME | End: 2024-05-20
Payer: MEDICARE

## 2024-05-20 DIAGNOSIS — M48.062 SPINAL STENOSIS, LUMBAR REGION, WITH NEUROGENIC CLAUDICATION: ICD-10-CM

## 2024-05-20 DIAGNOSIS — M51.36 DISC DEGENERATION, LUMBAR: ICD-10-CM

## 2024-05-20 PROCEDURE — 0 GADOBENATE DIMEGLUMINE 529 MG/ML SOLUTION: Performed by: PHYSICIAN ASSISTANT

## 2024-05-20 PROCEDURE — 72120 X-RAY BEND ONLY L-S SPINE: CPT

## 2024-05-20 PROCEDURE — 72158 MRI LUMBAR SPINE W/O & W/DYE: CPT

## 2024-05-20 PROCEDURE — A9577 INJ MULTIHANCE: HCPCS | Performed by: PHYSICIAN ASSISTANT

## 2024-05-20 RX ADMIN — GADOBENATE DIMEGLUMINE 20 ML: 529 INJECTION, SOLUTION INTRAVENOUS at 20:21

## 2024-05-27 LAB — CREAT BLDA-MCNC: 0.9 MG/DL (ref 0.6–1.3)

## 2024-05-29 ENCOUNTER — OFFICE VISIT (OUTPATIENT)
Dept: NEUROSURGERY | Facility: CLINIC | Age: 74
End: 2024-05-29
Payer: MEDICARE

## 2024-05-29 VITALS
WEIGHT: 184 LBS | BODY MASS INDEX: 32.6 KG/M2 | SYSTOLIC BLOOD PRESSURE: 118 MMHG | HEIGHT: 63 IN | DIASTOLIC BLOOD PRESSURE: 72 MMHG | TEMPERATURE: 97.3 F

## 2024-05-29 DIAGNOSIS — M48.062 SPINAL STENOSIS, LUMBAR REGION, WITH NEUROGENIC CLAUDICATION: Primary | ICD-10-CM

## 2024-05-29 DIAGNOSIS — M51.36 DISC DEGENERATION, LUMBAR: ICD-10-CM

## 2024-05-29 PROCEDURE — 1159F MED LIST DOCD IN RCRD: CPT | Performed by: PHYSICIAN ASSISTANT

## 2024-05-29 PROCEDURE — 3078F DIAST BP <80 MM HG: CPT | Performed by: PHYSICIAN ASSISTANT

## 2024-05-29 PROCEDURE — 1160F RVW MEDS BY RX/DR IN RCRD: CPT | Performed by: PHYSICIAN ASSISTANT

## 2024-05-29 PROCEDURE — 3074F SYST BP LT 130 MM HG: CPT | Performed by: PHYSICIAN ASSISTANT

## 2024-05-29 PROCEDURE — 99214 OFFICE O/P EST MOD 30 MIN: CPT | Performed by: PHYSICIAN ASSISTANT

## 2024-05-29 RX ORDER — LEVOFLOXACIN 500 MG/1
TABLET, FILM COATED ORAL
COMMUNITY
Start: 2024-05-24

## 2024-05-29 NOTE — PROGRESS NOTES
Patient: Eneida Bartholomew  : 1950  Chart #: [id number]    Date of Service: 2024    CHIEF COMPLAINT: Low back and left thigh pain with walking and standing intolerance    History of Present Illness Ms. Bartholomew is a 73-year-old woman who is known to Dr. Borja's service.  On 2019 she underwent right L3-4 foraminotomies as well as L4-5 PLIF.  She did well but developed recurrent symptoms and on 1/3/2022 she underwent PLIF extension to the L3-4 level with foraminotomies and discectomy on the left at L2-3.  Once again, she did really well postoperatively.  Over the last 6 months she has developed progressive low back pain that extends into the lower extremities-the anterior thighs specifically.  Symptoms are worse with prolonged standing and walking.  Pain will diminish when sitting and goes away completely when lying down.  Her activities have become very limited.  She is fearful of leaving the house by herself.  She is unable to do her normal activities of daily living due to her standing and walking limitations.  She has tried some simple exercises at home but cannot complete.      Past Medical History:   Diagnosis Date    Arthritis     Arthritis     Bronchitis     Chicken pox     Coronary artery disease     Fibromyalgia     GERD (gastroesophageal reflux disease)     Glaucoma     Glaucoma     History of cardiac cath     History of echocardiogram     History of Holter monitoring     History of migraine     History of PTCA     History of stress test     Hyperlipidemia     Hypertension     Measles     Stomach ulcer     UTI (urinary tract infection)          Current Outpatient Medications:     amLODIPine (NORVASC) 10 MG tablet, Take 1 tablet by mouth Daily., Disp: , Rfl:     aspirin 81 MG EC tablet, Take 1 tablet by mouth Daily., Disp: , Rfl:     baclofen (LIORESAL) 10 MG tablet, 1 tablet 3 (Three) Times a Day As Needed., Disp: , Rfl:     benazepril (LOTENSIN) 40 MG tablet, Take 1 tablet by mouth  Daily., Disp: , Rfl:     Cholecalciferol 25 MCG (1000 UT) tablet, Take 1 tablet by mouth Daily., Disp: , Rfl:     Flaxseed, Linseed, (FLAX SEED OIL PO), Take  by mouth., Disp: , Rfl:     hydroCHLOROthiazide (HYDRODIURIL) 25 MG tablet, Take 1 tablet by mouth Daily., Disp: , Rfl:     HYDROcodone-acetaminophen (NORCO) 5-325 MG per tablet, Take 1 tablet every 4 hours by oral route as needed for 5 days., Disp: , Rfl:     latanoprost (XALATAN) 0.005 % ophthalmic solution, , Disp: , Rfl:     levoFLOXacin (LEVAQUIN) 500 MG tablet, , Disp: , Rfl:     metoprolol succinate XL (TOPROL-XL) 100 MG 24 hr tablet, 1 tablet Daily., Disp: , Rfl:     Multiple Vitamins-Minerals (MULTIVITAL PO), Take 1 tablet by mouth Daily., Disp: , Rfl:     oxybutynin XL (DITROPAN-XL) 10 MG 24 hr tablet, Take 1 tablet by mouth Daily., Disp: , Rfl:     pantoprazole (PROTONIX) 40 MG EC tablet, , Disp: , Rfl:     rosuvastatin (CRESTOR) 40 MG tablet, Take 1 tablet by mouth Every Night., Disp: , Rfl:     vitamin C (ASCORBIC ACID) 250 MG tablet, Take 1 tablet by mouth Daily., Disp: , Rfl:     vitamin E 400 UNIT capsule, Take 1 capsule by mouth Daily., Disp: , Rfl:     Past Surgical History:   Procedure Laterality Date    BREAST BIOPSY Right     BUNIONECTOMY Bilateral     x4    CATARACT EXTRACTION EXTRACAPSULAR W/ INTRAOCULAR LENS IMPLANTATION Bilateral     CHOLECYSTECTOMY      CORONARY ANGIOPLASTY WITH STENT PLACEMENT      EYE SURGERY Bilateral     CATARACTS REMOVED    KNEE ARTHROSCOPY Right 2021    LUMBAR LAMINECTOMY WITH FUSION N/A 5/2/2019    Procedure: LUMBAR FUSION DECOMPRESSON WITH PEDICLE SCREWS L4-5, LAMINECTOMY, FORAMINOTOMY RIGHT L3-4;  Surgeon: Yoni Borja MD;  Location: [facility];  Service: Neurosurgery    LUMBAR LAMINECTOMY WITH FUSION N/A 1/3/2022    Procedure: LUMBAR FUSION extension DECOMPRESSON WITH PEDICLE SCREWS L3-4, with exploration L2-3, left, discectomy;  Surgeon: Yoni Borja MD;  Location: [facility] OR;  Service:  Neurosurgery;  Laterality: N/A;       Social History     Socioeconomic History    Marital status:      Spouse name: Claude   Tobacco Use    Smoking status: Never    Smokeless tobacco: Never   Vaping Use    Vaping status: Never Used   Substance and Sexual Activity    Alcohol use: No    Drug use: No    Sexual activity: Defer         Review of Systems   Constitutional:  Positive for fatigue. Negative for activity change, appetite change, chills, diaphoresis, fever and unexpected weight change.   HENT:  Positive for sinus pressure. Negative for congestion, dental problem, drooling, ear discharge, ear pain, facial swelling, hearing loss, mouth sores, nosebleeds, postnasal drip, rhinorrhea, sinus pain, sneezing, sore throat, tinnitus, trouble swallowing and voice change.    Eyes:  Positive for photophobia. Negative for pain, discharge, redness, itching and visual disturbance.   Respiratory:  Negative for apnea, cough, choking, chest tightness, shortness of breath, wheezing and stridor.    Cardiovascular:  Negative for chest pain, palpitations and leg swelling.   Gastrointestinal:  Negative for abdominal distention, abdominal pain, anal bleeding, blood in stool, constipation, diarrhea, nausea, rectal pain and vomiting.   Endocrine: Negative for cold intolerance, heat intolerance, polydipsia, polyphagia and polyuria.   Genitourinary:  Negative for decreased urine volume, difficulty urinating, dyspareunia, dysuria, enuresis, flank pain, frequency, genital sores, hematuria, menstrual problem, pelvic pain, urgency, vaginal bleeding, vaginal discharge and vaginal pain.   Musculoskeletal:  Positive for arthralgias and back pain. Negative for gait problem, joint swelling, myalgias, neck pain and neck stiffness.   Skin:  Negative for color change, pallor, rash and wound.   Allergic/Immunologic: Positive for food allergies. Negative for environmental allergies and immunocompromised state.   Neurological:  Positive for  "weakness. Negative for dizziness, tremors, seizures, syncope, facial asymmetry, speech difficulty, light-headedness, numbness and headaches.   Hematological:  Negative for adenopathy. Does not bruise/bleed easily.   Psychiatric/Behavioral:  Negative for agitation, behavioral problems, confusion, decreased concentration, dysphoric mood, hallucinations, self-injury, sleep disturbance and suicidal ideas. The patient is not nervous/anxious and is not hyperactive.        Objective   Vital Signs: Blood pressure 118/72, temperature 97.3 °F (36.3 °C), temperature source Infrared, height 160 cm (63\"), weight 83.5 kg (184 lb), not currently breastfeeding.  Physical Exam  Vitals and nursing note reviewed.   Constitutional:       General: She is not in acute distress.     Appearance: She is well-developed.   HENT:      Head: Normocephalic and atraumatic.   Psychiatric:         Behavior: Behavior normal.         Thought Content: Thought content normal.     Musculoskeletal:     Strength is intact in upper and lower extremities to direct testing.     Station and gait are normal.     Straight leg raising is negative.   Neurologic:     Muscle tone is normal throughout.     Sensation is intact to light touch throughout.     Patient is oriented to person, place, and time.         Independent review of radiographic imaging: MRI lumbar spine Mirella straits degenerative disc at L2-3, above previous construct.  Mild canal narrowing. Instability is not appreciated on flexion/extension films.  Imaging was also reviewed with Dr. Borja    Assessment & Plan   Diagnosis:  Probable transition syndrome   History of PLIF L3-4 and L4-5    Medical Decision Making: Clinically, patient is claudicating again. She always does well after her surgeries but has become limited once again in recent months. I am underwhelmed with findings on MRI. My suspect is that she has more stenosis on upright imaging. Our plan will be to do a CT lumbar myelogram for " further evaluation.  She will follow-up with Dr. Borja and further recommendations will be made at that time.    Diagnoses and all orders for this visit:    1. Spinal stenosis, lumbar region, with neurogenic claudication (Primary)  -     IR Myelogram Lumbar Spine; Future  -     CT Lumbar Spine With Intrathecal Contrast; Future    2. Disc degeneration, lumbar  -     IR Myelogram Lumbar Spine; Future  -     CT Lumbar Spine With Intrathecal Contrast; Future    Other orders  -     Obtain Informed Consent; Standing  -     No Lab Testing Needed; Standing                          Ave Chanel PA-C  Patient Care Team:  Regan Delgado MD as PCP - General (Internal Medicine)  Cari Rodriguez MD as Referring Physician (Internal Medicine)

## 2024-05-30 DIAGNOSIS — Z91.041 CONTRAST MEDIA ALLERGY: Primary | ICD-10-CM

## 2024-05-30 RX ORDER — DEXAMETHASONE 4 MG/1
4 TABLET ORAL EVERY 6 HOURS
Qty: 4 TABLET | Refills: 0 | Status: SHIPPED | OUTPATIENT
Start: 2024-05-30

## 2024-05-30 RX ORDER — DIPHENHYDRAMINE HCL 25 MG
25 TABLET ORAL EVERY 6 HOURS PRN
Qty: 4 TABLET | Refills: 0 | Status: SHIPPED | OUTPATIENT
Start: 2024-05-30

## 2024-08-09 ENCOUNTER — TELEPHONE (OUTPATIENT)
Dept: NEUROSURGERY | Facility: CLINIC | Age: 74
End: 2024-08-09
Payer: MEDICARE

## 2024-08-09 NOTE — TELEPHONE ENCOUNTER
Name and Age:  Eneida Bartholomew; 73   Provider:  Jonh       Previously seen for (Diagnosis):  Office Visit with Ave Chanel PA-C (2024)     Next Visit:  n/a    Surgery Date:  1/3/22    Surgery:  Op Note by Yoni Borja MD (2022 16:17)     When did symptoms(pain, numbness,   tingling, weakness?) start :  numbness; tingling; pain; spasms    Recent injury, accident, trauma?  no    Where is it located/radiate:  left arm to hand    How long has this been going on:  2-3 weeks       Characteristics of symptom/severity:  4 on scale of 1 - 10    POST OP? Fever, chills, purulent drainage, redness, swelling, worsening pain?:  n/a    Is it constant or intermittent:  constant     What makes it worse:    What makes it better: ice/heat; stretching; tylenol/ibuprofen/muscle relaxers    What therapies/medications have you tried:    Most recent imagin24 (MRI/XR-lumbar)    Red flag sxs: weakness, acute foot drop, bladder issues, saddle paresthesia?:           Pt has CT lumbar and IR Myelogram scheduled for 24---wants to know if this new and recent pain in her left arm changes/need to add anything

## 2024-08-09 NOTE — TELEPHONE ENCOUNTER
Called pt back and informed her of Roxana's recommendations.  Pt understood and was grateful for call.

## 2024-08-12 ENCOUNTER — TELEPHONE (OUTPATIENT)
Dept: INFUSION THERAPY | Facility: HOSPITAL | Age: 74
End: 2024-08-12
Payer: MEDICARE

## 2024-08-12 NOTE — TELEPHONE ENCOUNTER
Contacted patient as pre-procedure call prior to planned (myelogram/lumbar puncture) scheduled for (). Reviewed with patient arrival time, location,  needed, blood thinners, nothing to eat after midnight but clear liquids okay, may take medications the morning of the procedure but use caution with hypoglycemic medications until after allowed to eat. If procedure scheduled for afternoon okay to eat a light breakfast prior to 8 am. Plan on being here for procedure 4-5 hours so wear comfortable clothes. Reviewed medical history, medications, allergies and allowed time for questions. Instructed to take pre-meds for contrast allergy and to bring bottles with her,

## 2024-08-13 ENCOUNTER — HOSPITAL ENCOUNTER (OUTPATIENT)
Dept: GENERAL RADIOLOGY | Facility: HOSPITAL | Age: 74
Discharge: HOME | End: 2024-08-13
Payer: MEDICARE

## 2024-08-13 ENCOUNTER — HOSPITAL ENCOUNTER (OUTPATIENT)
Dept: CT IMAGING | Facility: HOSPITAL | Age: 74
Discharge: HOME | End: 2024-08-13
Payer: MEDICARE

## 2024-08-13 VITALS
OXYGEN SATURATION: 97 % | SYSTOLIC BLOOD PRESSURE: 136 MMHG | DIASTOLIC BLOOD PRESSURE: 71 MMHG | WEIGHT: 185.6 LBS | TEMPERATURE: 97.7 F | HEIGHT: 63 IN | RESPIRATION RATE: 18 BRPM | HEART RATE: 79 BPM | BODY MASS INDEX: 32.89 KG/M2

## 2024-08-13 DIAGNOSIS — M48.062 SPINAL STENOSIS, LUMBAR REGION, WITH NEUROGENIC CLAUDICATION: ICD-10-CM

## 2024-08-13 DIAGNOSIS — M51.36 DISC DEGENERATION, LUMBAR: ICD-10-CM

## 2024-08-13 DIAGNOSIS — M48.062 SPINAL STENOSIS, LUMBAR REGION WITH NEUROGENIC CLAUDICATION: ICD-10-CM

## 2024-08-13 PROCEDURE — 62304 MYELOGRAPHY LUMBAR INJECTION: CPT

## 2024-08-13 PROCEDURE — 25010000002 LIDOCAINE 1 % SOLUTION: Performed by: PHYSICIAN ASSISTANT

## 2024-08-13 PROCEDURE — 72240 MYELOGRAPHY NECK SPINE: CPT

## 2024-08-13 PROCEDURE — 72132 CT LUMBAR SPINE W/DYE: CPT

## 2024-08-13 PROCEDURE — 25510000001 IOPAMIDOL 41 % SOLUTION: Performed by: PHYSICIAN ASSISTANT

## 2024-08-13 PROCEDURE — 72120 X-RAY BEND ONLY L-S SPINE: CPT

## 2024-08-13 RX ORDER — IOPAMIDOL 408 MG/ML
20 INJECTION, SOLUTION INTRATHECAL
Status: COMPLETED | OUTPATIENT
Start: 2024-08-13 | End: 2024-08-13

## 2024-08-13 RX ORDER — LIDOCAINE HYDROCHLORIDE 10 MG/ML
3 INJECTION, SOLUTION INFILTRATION; PERINEURAL ONCE
Status: COMPLETED | OUTPATIENT
Start: 2024-08-13 | End: 2024-08-13

## 2024-08-13 RX ORDER — ONDANSETRON 4 MG/1
4 TABLET, ORALLY DISINTEGRATING ORAL ONCE AS NEEDED
Status: DISCONTINUED | OUTPATIENT
Start: 2024-08-13 | End: 2024-08-14 | Stop reason: HOSPADM

## 2024-08-13 RX ORDER — PROMETHAZINE HYDROCHLORIDE 25 MG/1
25 TABLET ORAL ONCE AS NEEDED
Status: DISCONTINUED | OUTPATIENT
Start: 2024-08-13 | End: 2024-08-14 | Stop reason: HOSPADM

## 2024-08-13 RX ADMIN — IOPAMIDOL 20 ML: 408 INJECTION, SOLUTION INTRATHECAL at 07:50

## 2024-08-13 RX ADMIN — LIDOCAINE HYDROCHLORIDE 3 ML: 10 INJECTION, SOLUTION INFILTRATION; PERINEURAL at 07:51

## 2024-08-13 NOTE — DISCHARGE INSTRUCTIONS
You will need to rest in a reclined position the remainder of the day today. Avoid any strenuous activities, pulling, tugging, or straining for 48 hours.     You will need to drink plenty of fluids for the next 48 hours to avoid the risk of a spinal headache and to flush the contrast dye out of your system. Include caffeinated beverages especially if you are experiencing a headache.    You may remove the bandaid tomorrow and shower tomorrow; but you will need to avoid tub baths or any situation where your are submersed in water for the next 5 days to avoid the risk of infection.     You will need to take the cd you were given today with you to your follow up appointment.    DO NOT DRIVE ON THE DAY OF YOUR PROCEDURE.    If you have any problems or concern please contact your physician's office.

## 2024-08-13 NOTE — POST-PROCEDURE NOTE
MYELOGRAM PROCEDURE NOTE  Neurosurgery    Patient: Eneida Bartholomew  : 1950      PreOp Diagnosis: Lumbar stenosis with neurogenic claudication    PostOp Diagnosis: Same    Procedure: Lumbar myelogram    Surgeon: Jonh    Anesthesia: 1% lidocaine    Technique:   Spinal needle: 22 gauge   Contrast: Isovue 200 (20ml)   Injection site: L4-5    EBL: Trace    Specimens removed: None    Complication: None        Yoni Borja MD  24  07:09 EDT

## 2024-08-14 ENCOUNTER — TELEPHONE (OUTPATIENT)
Dept: INFUSION THERAPY | Facility: HOSPITAL | Age: 74
End: 2024-08-14
Payer: MEDICARE

## 2024-08-20 ENCOUNTER — OFFICE VISIT (OUTPATIENT)
Dept: NEUROSURGERY | Facility: CLINIC | Age: 74
End: 2024-08-20
Payer: MEDICARE

## 2024-08-20 VITALS — TEMPERATURE: 97.7 F | HEIGHT: 63 IN | BODY MASS INDEX: 32.6 KG/M2 | WEIGHT: 184 LBS

## 2024-08-20 DIAGNOSIS — M48.062 SPINAL STENOSIS OF LUMBAR REGION WITH NEUROGENIC CLAUDICATION: ICD-10-CM

## 2024-08-20 DIAGNOSIS — M54.9 MECHANICAL BACK PAIN: Primary | ICD-10-CM

## 2024-08-20 DIAGNOSIS — M51.36 DDD (DEGENERATIVE DISC DISEASE), LUMBAR: ICD-10-CM

## 2024-08-20 PROCEDURE — 1160F RVW MEDS BY RX/DR IN RCRD: CPT | Performed by: NEUROLOGICAL SURGERY

## 2024-08-20 PROCEDURE — 1159F MED LIST DOCD IN RCRD: CPT | Performed by: NEUROLOGICAL SURGERY

## 2024-08-20 PROCEDURE — 99213 OFFICE O/P EST LOW 20 MIN: CPT | Performed by: NEUROLOGICAL SURGERY

## 2024-08-20 RX ORDER — OMEPRAZOLE 40 MG/1
40 CAPSULE, DELAYED RELEASE ORAL
COMMUNITY

## 2024-08-20 RX ORDER — MECLIZINE HCL 12.5 MG/1
12.5 TABLET ORAL
COMMUNITY
Start: 2024-06-20

## 2024-08-20 RX ORDER — FLUTICASONE PROPIONATE 50 MCG
1 SPRAY, SUSPENSION (ML) NASAL
COMMUNITY

## 2024-08-20 NOTE — PROGRESS NOTES
Patient: Eneida Bartholomew  : 1950    Primary Care Provider: Regan Delgado MD    Requesting Provider: As above        History    Chief Complaint: Low back and lower extremity pain.    History of Present Illness:  Ms. Bartholomew is a 73-year-old woman who is known to my service.  On 2019 she underwent right L3-4 foraminotomy as well as L4-5 PLIF.  She did well but developed recurrent symptoms and on 1/3/2022 she underwent PLIF extension to the L3-4 level with foraminotomies and discectomy on the left at L2-3.  Once again, she did really well postoperatively.  Over the last 6 months she has developed progressive low back pain that extends into the lower extremities-the anterior thighs specifically.  Symptoms are worse with prolonged standing and walking.  Pain will diminish when sitting and goes away completely when lying down.  Her activities have become very limited.  She is fearful of leaving the house by herself.  She is unable to do her normal activities of daily living due to her standing and walking limitations.  She has tried some simple exercises at home but cannot complete.  She has having good days and bad days.  Overall she is better when off her feet.       Review of Systems   Constitutional:  Negative for activity change, appetite change, chills, diaphoresis, fatigue, fever and unexpected weight change.   HENT:  Negative for congestion, dental problem, drooling, ear discharge, ear pain, facial swelling, hearing loss, mouth sores, nosebleeds, postnasal drip, rhinorrhea, sinus pressure, sinus pain, sneezing, sore throat, tinnitus, trouble swallowing and voice change.    Eyes:  Negative for photophobia, pain, discharge, redness, itching and visual disturbance.   Respiratory:  Negative for apnea, cough, choking, chest tightness, shortness of breath, wheezing and stridor.    Cardiovascular:  Negative for chest pain, palpitations and leg swelling.   Gastrointestinal:  Negative for abdominal  "distention, abdominal pain, anal bleeding, blood in stool, constipation, diarrhea, nausea, rectal pain and vomiting.   Endocrine: Negative for cold intolerance, heat intolerance, polydipsia, polyphagia and polyuria.   Genitourinary:  Negative for decreased urine volume, difficulty urinating, dyspareunia, dysuria, enuresis, flank pain, frequency, genital sores, hematuria, menstrual problem, pelvic pain, urgency, vaginal bleeding, vaginal discharge and vaginal pain.   Musculoskeletal:  Positive for back pain. Negative for arthralgias, gait problem, joint swelling, myalgias, neck pain and neck stiffness.   Skin:  Negative for color change, pallor, rash and wound.   Allergic/Immunologic: Negative for environmental allergies, food allergies and immunocompromised state.   Neurological:  Negative for dizziness, tremors, seizures, syncope, facial asymmetry, speech difficulty, weakness, light-headedness, numbness and headaches.   Hematological:  Negative for adenopathy. Does not bruise/bleed easily.   Psychiatric/Behavioral:  Negative for agitation, behavioral problems, confusion, decreased concentration, dysphoric mood, hallucinations, self-injury, sleep disturbance and suicidal ideas. The patient is not nervous/anxious and is not hyperactive.      The patient's past medical history, past surgical history, family history, and social history have been reviewed at length in the electronic medical record.      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  High-grade central stenosis is not observed.  There is a little bit of angulation above her construct.    Diagnosis:   Low back and bilateral lower extremity pain with history of lumbar fusion.    Treatment Options:   I have referred the " patient to pain management for some blocks.  I am not convinced at this point that extending her fusion up a level is going to be productive.  That might or might not be the case.  She will follow-up after treatment in that setting.          I, Dr. Borja, personally performed the services described in the documentation, as scribed in my presence, and it is both accurate and complete.

## 2024-09-03 NOTE — PROGRESS NOTES
"Chief Complaint: \"Pain in my lower back and legs\"      History of Present Illness:   Patient: Ms. Eneida Bartholomew, 74 y.o. female   Referring Physician: Dr. Yoni Borja   Reason for Referral: Consultation for chronic intractable lower back pain.   Pain History: Patient reports a several year history of chronic intractable lower back pain. Eneida Bartholomew underwent on 05/02/2019 right L3-L4 foraminotomy and L4-5 PLIF by Dr Borja.  She did well for several months but developed recurrent symptoms. On 01/03/2022, she underwent PLIF extension to the L3-L4 level with foraminotomies and discectomy on the left at L2-L3 by Dr. Borja. She did great for 3-5 months postoperatively. Over the past 18 months, she developed recurrent lower back pain that radiates into the posterior aspect of her thighs. She report intolerance to standing and walking. Pain decreases by sitting down or lying down. Her activities have become quite limited. She has been unable to do her normal activities of daily living due to her standing and walking limitations. She participates in a HEP and participated in physical therapy in August 2024. MRI of the lumbar spine w wo contrast on 05/20/2024. Posterior decompression and fusion L3-L5 with operative levels well decompressed. Mild retrolisthesis of L2 on L3. At L2-L3: Moderate to severe left neuroforaminal stenosis. Lumbar myelogram with flexion and extension films on 08/13/2024. The hardware appears to be appropriately positioned, and intact. Good filling of the thecal sac. Effacement of the nerve root sleeves bilaterally at the L2-L3 level. Moderate anterior impression on the thecal sac at the L2-L3 level. CT myelogram of the lumbar spine on 08/13/2024. Grade 1 retrolisthesis of L2 on L3, and mild grade 1 anterolisthesis of L5 on S1. L2-L3: Severe left neural foraminal stenosis. L3-L4: Moderate left neural foraminal stenosis. Eneida Bartholomew has failed to obtain pain relief with " conservative measures for more than > 5 years including oral analgesics, opioids, topical analgesics, ice, heat, TENs, physical therapy (last visit August 2024, got worse), physical therapist directed home exercise program HEP (ongoing), independent exercise program (ongoing), to name a few. Pain has progressed in intensity over the past years. Eneida Bartholomew underwent neurosurgical consultation with Dr. Yoni Borja on 08/20/202, and was found not to be a surgical candidate. Dr. Borja explained that he was not convinced that extending her fusion would be beneficial.    Pain Description: Constant lower back pain with intermittent exacerbation, described as aching, dull, sharp, throbbing, and burning sensation.   Radiation of Pain: The pain radiates into the left gluteal region and the lateral aspect of her left thigh  Pain intensity today: 3/10   Average pain intensity last week: 7/10  Pain intensity ranges from: 3/10 to 10/10  Aggravating factors: Pain increases with standing, walking. Patient describes neurogenic claudication. Patient uses a walker  Alleviating factors: Pain decreases with sitting down, sitting on a recliner, lying down  Associated Symptoms:   Patient reports pain, numbness, and weakness in the lower extremities.   Patient denies any new bladder or bowel problems.   Patient reports difficulties with her balance but denies recent falls.   Pain interferes with ADLs, general activities (ability to walk, stand, transition from different positions), and affects patient's quality of life  Pain does not interfere with sleep  Muscle spasms: BLE  Stiffness: LB    Review of previous therapies and additional medical records:  Eneida Bartholomew has already failed the following measures, including:   Conservative Measures: Oral analgesics, opioids, topical analgesics, ice, heat, TENs, physical therapy (last visit August 2024, got worse), physical therapist directed home exercise program HEP  (ongoing), independent exercise program (ongoing)  Interventional Measures: None  Surgical Measures: No history of previous cervical spine or hip surgery   01/03/2022: PLIF extension to the L3-L4 level with foraminotomies and discectomy on the left at L2-L3 by Dr. Borja.   05/02/2019: Right L3-L4 foraminotomy and L4-5 PLIF by Dr Borja  Eneida Bartholomew underwent neurosurgical consultation with Dr. Yoni Borja on 08/20/202, and was found not to be a surgical candidate. Dr. Borja explained that he was not convinced that extending her fusion would be beneficial.    Eneida Bartholomew presents with significant comorbidities including coronary artery disease, Fibromyalgia, GERD, glaucoma, hyperlipidemia, hypertension, on aspirin 81 mg  In terms of current analgesics, Eneida Bartholomew takes: ibuprofen, baclofen, Norco. Patient also takes meclizine   I have reviewed Sha Report consistent with medication reconciliation.  SOAPP/ORT: Low Risk     PHQ-2 Depression Screening  Little interest or pleasure in doing things? 0-->not at all   Feeling down, depressed, or hopeless? 0-->not at all   PHQ-2 Total Score 0     Pain Self-Efficacy Questionnaire (PSEQ)  ITEM 09-10  2024        I can enjoy things despite the pain. 3        I can do most of the household chores (tidying up, washing dishes, etc), despite the pain. 2        I can socialize with my friends or family members as often as I used to do, despite the pain. 2        I can cope with my pain in most situations. 2        I can do some form of work, despite the pain (includes housework, paid, and unpaid work). 2        I can still do many of the things I enjoy doing, such as hobbies or leisure activity despite pain. 3        I can cope with my pain without medications. 3        I can accomplish most of my goals in life despite the pain. 4        I can live in a normal lifestyle, despite the pain. 4        I can gradually become more active, despite the pain.  4        TOTAL SCORE 29/60            Global Pain Scale 09-10  2024          Pain 15          Feelings 2          Clinical outcomes 1          Activities 2          GPS Total: 20            The [place] Back Pain Disability Scale   DATE 09-10  2024          Sleep through the night 1          Turn over in bed 0          Get out of bed 1          Make your bed 1          Put on socks (pantyhose) 1          Ride in a car 0          Sit in a chair for several hours 2          Stand up for 20-30 minutes 3          Climb one flight of stairs 2          Walk a few blocks (200-300 yards)  4          Walk several miles 5          Run one block (about 50 yards) 5          Take food out of the refrigerator 1          Reach up to high shelves 1          Move a chair 1          Pull or push heavy doors 3          Bend over to clean the bathtub 3          Throw a ball 1          Carry two bags of groceries 1          Lift and carry a heavy suitcase 4          Total score 40            [place] Claudication Score  All questions are in reference to an average for the past month 09-10  2024          PAIN FREQUENCY:   How often have you experienced pain in your back or buttock or pain that goes down your back, buttock, and/or legs?  Not at all  Less than once a week  At least once a week  Every day for at least a few minutes  Every day for most of the day  Every minute of the day 4          PAIN SEVERITY:   How would you describe the worst pain you have had in your back, buttock, and/or legs?  0. None  1. Mild  2. Moderate  3. Severe  4. Very severe  5. Intolerable 4          BACK PAIN SEVERITY:   How would you describe the pain or discomfort in your back and/or buttocks?  0. None  1. Mild  2. Moderate  3. Severe  4. Very severe  5. Intolerable 5          LEG PAIN SEVERITY:   How would you describe the pain or discomfort in your legs or feet?  None  Mild  Moderate  Severe  Very severe  Intolerable 4          NERVE SYMPTOM SEVERITY:   How  would you describe the numbness or tingling in your legs or feet?  None  Mild  Moderate  Severe  Very severe  Intolerable 3          LEG WEAKNESS:   How would you describe the strength in your legs, ankles, or feet?  None  Mild  Moderate  Severe  Very severe  Intolerable 3          BALANCE:   Which statement best describes your steadiness when standing or walking?  0. I have had no problems with my balance.  1. I sometimes feel off-balance but I am able to walk without any aid.  2. I often feel off-balance but I am able to walk with an aid.  3. I am unable to walk without an aid.  4. I have difficulty walking despite using an aid.  5. I cannot stand up. 4          WALKING DISTANCE:   When you went for a walk, how far were you able to walk before your back or leg started giving you trouble?  0. No limits or more than 2 miles   1. More than 1/4 mile but less than 2 miles  2. More than 100 yards but less than 1/4 mile  3. More than 50 feet but less than 100 yards  4. Less than 50 feet  5. Not at all 4          WALKING ABILITY:   Which statement best describes your walking ability?  0. There is no limit to my walking ability.  1. I can walk far enough to do everything I want to do.  2. I am able to walk comfortably from my home to the shops or my transport.  3. I am able to walk comfortably around the house.  4. I am able to walk only from the bedroom to the bathroom or kitchen.  5. I am not able to walk at all. 4          WALKING SPEED:   Which statement best describes your walking?  0. I am able to walk at a normal speed.  1. I walk slowly but standing upright.  2. I walk slowly and bent forward.  3. I have to stop and stand still when I walk.  4. I have to stop and sit down when I walk.  5. I cannot walk at all. 4          Total Score: Total Score _____% = (___) x 100                                                              50  39            Review of Diagnostic Studies: I have independently reviewed and  interpreted the images with the patient and used the images and a tridimensional spine model to explain findings. I have also reviewed the reports.  MRI of the lumbar spine w wo contrast on 05/20/2024. Posterior decompression and fusion L3-L5 with operative levels well decompressed. Mild retrolisthesis of L2 on L3. The conus medullaris and cauda equina nerve roots appear satisfactory. Diffuse spondylosis. Axial imaging:  L1-L2: No significant spinal canal or neuroforaminal stenosis.  L2-L3: Disc bulge, facet arthropathy. No significant spinal canal stenosis. Moderate to severe left and mild right neuroforaminal stenosis.  L3-L4: Postoperative changes with decompressed spinal canal and bilateral neural foramina  L4-L5: Postoperative changes with decompressed spinal canal and bilateral neural foramina  L5-S1: Disc bulge, facet arthropathy. The spinal canal remains patent. Mild to moderate right neuroforaminal stenosis.  Lumbar myelogram with flexion and extension films on 08/13/2024. Posterior fusion L3-L5. The hardware appears to be appropriately positioned, and intact. There is mild dextroscoliosis of the lumbar spine. There was good filling of the thecal sac. No intrathecal mass was identified. There is effacement of the nerve root sleeves bilaterally at the L2-L3 level. There is moderate anterior impression on the thecal sac at the L2-L3 level. There was limited range of motion with flexion and extension  CT myelogram of the lumbar spine on 08/13/2024. Mild grade 1 retrolisthesis of L2 on L3, and mild grade 1 anterolisthesis of L5 on S1. There is dextrocurvature of the upper lumbar spine. There are changes from decompressive laminectomies with posterior fusion involving vertical rods with paired pedicle screws at L3, L4, and L5, with associated intervertebral spacers. The hardware appears intact. The conus terminates at the L1-2 level.  L1-L2: Disc bulge, facet arthropathy. No spinal canal stenosis. Mild left  neural foraminal stenosis.  L2-L3: Disc osteophyte complex, facet arthropathy. Mild spinal canal stenosis. Mild to moderate right and severe left neural foraminal stenosis.   L3-L4: Laminectomy. No spinal canal stenosis. Moderate left neural foraminal stenosis.  L4-L5: Laminectomy. Mild left facet arthropathy. No spinal canal stenosis. Mild bilateral neural foraminal stenosis.  L5-S1: Mild disc bulge. Severe bilateral facet arthropathy. No spinal canal stenosis. Moderate right neural foraminal stenosis.    Review of Systems   Constitutional:  Positive for unexpected weight change.   HENT:  Positive for tinnitus.    Eyes:  Positive for photophobia and itching.   Musculoskeletal:  Positive for back pain and gait problem.   Allergic/Immunologic: Positive for food allergies.   Neurological:  Positive for weakness.   All other systems reviewed and are negative.        Patient Active Problem List   Diagnosis    Lumbar stenosis with neurogenic claudication    Coronary artery disease    Hypertension    Hyperlipidemia    Spinal stenosis, lumbar region, with neurogenic claudication    Status post lumbar spinal fusion    Lumbar postlaminectomy syndrome    Physical deconditioning    Gait disturbance       Past Medical History:   Diagnosis Date    Arthritis     Arthritis     Bronchitis     Chicken pox     Coronary artery disease     Fibromyalgia     GERD (gastroesophageal reflux disease)     Glaucoma     Glaucoma     History of cardiac cath     History of echocardiogram     History of Holter monitoring     History of migraine     History of PTCA     History of stress test     Hyperlipidemia     Hypertension     Measles     Stomach ulcer     UTI (urinary tract infection)          Past Surgical History:   Procedure Laterality Date    BREAST BIOPSY Right     BUNIONECTOMY Bilateral     x4    CATARACT EXTRACTION EXTRACAPSULAR W/ INTRAOCULAR LENS IMPLANTATION Bilateral     CHOLECYSTECTOMY      CORONARY ANGIOPLASTY WITH STENT PLACEMENT       EYE SURGERY Bilateral     CATARACTS REMOVED    KNEE ARTHROSCOPY Right 2021    LUMBAR LAMINECTOMY WITH FUSION N/A 5/2/2019    Procedure: LUMBAR FUSION DECOMPRESSON WITH PEDICLE SCREWS L4-5, LAMINECTOMY, FORAMINOTOMY RIGHT L3-4;  Surgeon: Yoni Borja MD;  Location: [facility] OR;  Service: Neurosurgery    LUMBAR LAMINECTOMY WITH FUSION N/A 1/3/2022    Procedure: LUMBAR FUSION extension DECOMPRESSON WITH PEDICLE SCREWS L3-4, with exploration L2-3, left, discectomy;  Surgeon: Yoni Borja MD;  Location: [facility] OR;  Service: Neurosurgery;  Laterality: N/A;         Family History   Problem Relation Age of Onset    Cancer Mother     Heart disease Father          Social History     Socioeconomic History    Marital status:      Spouse name: Claude   Tobacco Use    Smoking status: Never     Passive exposure: Never    Smokeless tobacco: Never   Vaping Use    Vaping status: Never Used   Substance and Sexual Activity    Alcohol use: No    Drug use: No    Sexual activity: Defer           Current Outpatient Medications:     amLODIPine (NORVASC) 10 MG tablet, Take 1 tablet by mouth Daily., Disp: , Rfl:     aspirin 81 MG EC tablet, Take 1 tablet by mouth Daily., Disp: , Rfl:     baclofen (LIORESAL) 10 MG tablet, 1 tablet 3 (Three) Times a Day As Needed., Disp: , Rfl:     benazepril (LOTENSIN) 40 MG tablet, Take 1 tablet by mouth Daily., Disp: , Rfl:     Cholecalciferol 25 MCG (1000 UT) tablet, Take 1 tablet by mouth Daily., Disp: , Rfl:     Flaxseed, Linseed, (FLAX SEED OIL PO), Take  by mouth., Disp: , Rfl:     fluticasone (FLONASE) 50 MCG/ACT nasal spray, 1 spray., Disp: , Rfl:     hydroCHLOROthiazide (HYDRODIURIL) 25 MG tablet, Take 1 tablet by mouth Daily., Disp: , Rfl:     HYDROcodone-acetaminophen (NORCO) 5-325 MG per tablet, Take 1 tablet every 4 hours by oral route as needed for 5 days., Disp: , Rfl:     latanoprost (XALATAN) 0.005 % ophthalmic solution, , Disp: , Rfl:     meclizine (ANTIVERT) 12.5 MG  "tablet, 1 tablet., Disp: , Rfl:     metoprolol succinate XL (TOPROL-XL) 100 MG 24 hr tablet, 1 tablet Daily., Disp: , Rfl:     Multiple Vitamins-Minerals (MULTIVITAL PO), Take 1 tablet by mouth Daily., Disp: , Rfl:     omeprazole (priLOSEC) 40 MG capsule, 1 capsule., Disp: , Rfl:     oxybutynin XL (DITROPAN-XL) 10 MG 24 hr tablet, Take 1 tablet by mouth Daily., Disp: , Rfl:     pantoprazole (PROTONIX) 40 MG EC tablet, , Disp: , Rfl:     rosuvastatin (CRESTOR) 40 MG tablet, Take 1 tablet by mouth Every Night., Disp: , Rfl:     vitamin C (ASCORBIC ACID) 250 MG tablet, Take 1 tablet by mouth Daily., Disp: , Rfl:     vitamin E 400 UNIT capsule, Take 1 capsule by mouth Daily., Disp: , Rfl:       Allergies   Allergen Reactions    Other Shortness Of Breath     Lavendar scent caused SOA and swelling of throat    Prednisone Shortness Of Breath     And nervous    Shellfish-Derived Products Anaphylaxis, Rash, Shortness Of Breath and Swelling     ALL SEAFOOD, FISH    Gabapentin Unknown - Low Severity    Iodinated Contrast Media Other (See Comments)     Other reaction(s): Feels unwell, Hypotension    Tramadol Unknown - Low Severity    Erythromycin Rash    Omeprazole Rash    Sulfa Antibiotics Rash         Pulse 80   Ht 160 cm (63\")   Wt 84.7 kg (186 lb 11.2 oz)   SpO2 98%   BMI 33.07 kg/m²       Physical Exam:  Constitutional: Patient appears well-developed, well-nourished, well-hydrated, appears younger than stated age  HEENT: Head: Normocephalic and atraumatic  Eyes: Conjunctivae and lids are normal  Pupils: Equal, round, reactive to light  Peripheral vascular exam: Posterior tibialis: right 2+ and left 2+. Dorsalis pedis: right 2+ and left 2+. No edema.   Musculoskeletal   Gait and station: Gait evaluation demonstrated shuffling, stooping, antalgic. Unable to walk on heels, toes  Lumbar Spine: Passive and active range of motion are limited but without pain. Extension, flexion, lateral flexion, rotation of the lumbar spine " did not increase or reproduce pain. Lumbar facet joint loading maneuvers are negative.   Sacroiliac Joints Provocative Maneuvers: Negative   Piriformis maneuvers: Negative   Right Hip Joint: The range of motion of the hip joint is almost full and without pain   Left Hip Joint: The range of motion of the hip joint is almost full and without pain   Palpation of the bilateral psoas tendons and iliopsoas bursas: Unrevealing   Palpation of the bilateral greater trochanters: Unrevealing   Examination of the Iliotibial band: Unrevealing   Neurological:   Patient is alert and oriented to person, place, and time.   Speech: Normal.   Cortical function: Normal mental status.   Reflex Scores:  Right patellar: 0+  Left patellar: 0+  Right Achilles: 0+  Left Achilles: 0+  Motor strength: 5/5  Motor Tone: Normal  Involuntary movements: None.   Superficial/Primitive Reflexes: Primitive reflexes were absent.   Right Herrera: Absent  Left Herrera: Absent  Right ankle clonus: Absent  Left ankle clonus: Absent   Babinsky: Absent  Long tract signs: Negative. Straight leg raising test: Negative. Femoral stretch sign: Negative.   Sensory exam: Intact to light touch, intact pain and temperature sensation, intact vibration sensation and normal proprioception  Coordination: Finger to nose: Normal. Romberg's sign: Negative   Skin and subcutaneous tissue: Skin is warm and intact. No rash noted. No cyanosis.   Psychiatric: Judgment and insight: Normal. Recent and remote memory: Intact. Mood and affect: Normal.         ASSESSMENT:   1. Lumbar stenosis with neurogenic claudication    2. Lumbar postlaminectomy syndrome    3. Status post lumbar spinal fusion    4. Gait disturbance    5. Physical deconditioning        PLAN/MEDICAL DECISION MAKING:  Ms. Eneida Bartholomew, 74 y.o. female presents with a several year history of chronic intractable lower back pain. Eneida Bartholomew underwent on 05/02/2019 right L3-L4 foraminotomy and L4-5 PLIF by  Dr Borja.  She did well for several months but developed recurrent symptoms. On 01/03/2022, she underwent PLIF extension to the L3-L4 level with foraminotomies and discectomy on the left at L2-L3 by Dr. Borja. She did great for 3-5 months postoperatively. Over the past 18 months, she developed recurrent lower back pain that radiates into the posterior aspect of her thighs. She report intolerance to standing and walking. Pain decreases by sitting down or lying down. Her activities have become quite limited. She has been unable to do her normal activities of daily living due to her standing and walking limitations. She participates in a HEP and participated in physical therapy in August 2024. MRI of the lumbar spine w wo contrast on 05/20/2024. Posterior decompression and fusion L3-L5 with operative levels well decompressed. Mild retrolisthesis of L2 on L3. At L2-L3: Moderate to severe left neuroforaminal stenosis. Lumbar myelogram with flexion and extension films on 08/13/2024. The hardware appears to be appropriately positioned, and intact. Good filling of the thecal sac. Effacement of the nerve root sleeves bilaterally at the L2-L3 level. Moderate anterior impression on the thecal sac at the L2-L3 level. CT myelogram of the lumbar spine on 08/13/2024. Grade 1 retrolisthesis of L2 on L3, and mild grade 1 anterolisthesis of L5 on S1. L2-L3: Severe left neural foraminal stenosis. L3-L4: Moderate left neural foraminal stenosis. Eneida Bartholomew has failed to obtain pain relief with conservative measures for more than > 5 years including oral analgesics, opioids, topical analgesics, ice, heat, TENs, physical therapy (last visit August 2024, got worse), physical therapist directed home exercise program HEP (ongoing), independent exercise program (ongoing), to name a few. Pain has progressed in intensity over the past years. Eneida Bartholomew underwent neurosurgical consultation with Dr. Yoni Borja on  08/20/202, and was found not to be a surgical candidate. Dr. Borja explained that he was not convinced that extending her fusion would be beneficial. A comprehensive evaluation including history and physical exam along with pertinent physiologic and functional assessment was performed. Patient presents with intractable pain due to the diagnoses listed above. Patient has failed to respond to conservative modalities and previous surgical interventions, as referenced under HPI. I have documented the impact of patient's moderate-to-severe pain contributing to significant impairment in daily activities, ADLs, and a negative impact on the patient's quality of life, as reflected on Global Pain Scale 20/100; The [place] Back Pain Disability Scale 40/100; [place] Claudication Score 39/50; Tinetti Gait & Balance Assessment Tool  (moderate risk for falls). I have reviewed pertinent supporting diagnostic studies of patient's chronic pain condition as well as all available pertinent medical records to patient's chronic pain condition including previous therapies, as referenced above. PHQ-2 Depression Screening 0; Pain Self-Efficacy Questionnaire (PSEQ) 29/60. I had a lengthy conversation with Ms. Eneida Bartholomew regarding her chronic pain condition and potential therapeutic options including risks, benefits, alternative therapies, to name a few. We have discussed using a stepwise approach starting with the least intense level of care as determined by the extent required to diagnose and or treat a patient's condition. The proposed treatments are consistent with the patient's medical condition and known to be safe and effective by current guidelines and the standard of care. The duration and frequency proposed are considered appropriate for the service in accordance with accepted standards of medical practice for the diagnosis and treatment of the patient's condition and intended to improve the patient's level of function.  These services will be furnished in a setting appropriate to the patient's medical needs and condition. Therefore, I have proposed the following plan:    1. Interventional pain management measures: Patient does not take blood thinners other than ASA 81 mg. Patient will be scheduled for diagnostic and therapeutic left L2-L3 transforaminal epidural steroid injection using the lowest effective dose of steroids, under C-arm fluoroscopic guidance, with the use of contrast dye to confirm appropriate needle placement and spread of contrast dye. We may repeat therapeutic left L2-L3 transforaminal epidural steroid injection depending on patient's outcome and following current guidelines. Epidurals will be limited to a maximum of 4 sessions per spinal region in a rolling twelve (12) month period. Continuation of epidural steroid injections over 12 months would only be considered under the following provisions;  Patient is a high-risk surgical candidate, or the patient does not desire surgery, or recurrence of pain in the same location relieved with ESIs for at least three months and epidural provides at least 50% sustained improvement of pain and/or 50% objective improvement in function (using same scale as baseline)  Pain is severe enough to cause a significant degree of functional disability or vocational disability  The primary care provider will be notified regarding continuation of procedures and repeat steroid use   Other options for treatment of patient's pain would include a spinal cord stimulator trial with [employer]. Patient has received formal education from me including risks, benefits, and alternative treatments, as well as detailed information regarding the procedure and specific goals for the trial. Patient has also received didactic materials including educational booklets and DVDs on spinal cord stimulation therapies.    2. Diagnostic studies:   A. MRI of the thoracic spine without contrast to assess capacity  and patency of the spinal canal and epidural space prior to spinal cord stimulator trial and implant  B. EMG/NCV of the bilateral lower extremities   C. Prior to SCS: CBC, PT, PTT     3. Pharmacological measures: Reviewed and discussed; Patient takes ibuprofen, baclofen, Norco. Patient also takes meclizine. Patient has declined additional pharmacological measures     4. Long-term rehabilitation efforts:  A. The patient does not have a history of falls but has risk factors for falls. I performed a risk assessment for falls using the [employer]etti gait & balance assessment tool (scored moderate risk for falls). Fall precautions: Patient has been instructed regarding universal fall precautions, such as;   Using gait aids a walker at the appropriate height at all times for ambulation   Removing all area rugs and coffee tables to create a safe environment at home  Ensure clean, dry floors  Wearing supportive footwear and properly fitting clothing  Ensure bed/chair is appropriate height and patient's feet can touch the floor  Using a shower transfer bench  Using walk-in shower and having shower safety bars installed  Ensure proper lighting, minimize glare  Have nightlights operational and in use  Participation in an exercise program for gait training, balance training and strength  Avoid carrying laundry up and down steps  Ensure proper compliance and organization of medications to avoid errors   Avoid use of over the counter sedatives and alcohol consumption  Ensure easy access to call bell, glasses, TV control, telephone  Ensure glasses/hearing aids are in use or close by (on top of night table)  B. Patient will start a comprehensive physical therapy program at [facility] for [facility], gait and balance training, neurodynamics, core strengthening, gluteal and abductor strengthening, home exercise program, 2-3 x per week for 8 weeks   C. Contrast therapy: Apply ice-packs for 15-20 minutes, followed by heating  pads for 15-20 minutes to affected area   D. Start a low impact exercise program such as water therapy, swimming, chair yoga  E. Referral to Dr. Tyson Deal for psychological clearance for peripheral nerve stimulation, spinal cord stimulation, intrathecal therapies  F. Eneida Bartholomew  reports that she has never smoked. She has never been exposed to tobacco smoke. She has never used smokeless tobacco.    5. The patient has been instructed to contact my office with any questions or difficulties. The patient understands the plan and agrees to proceed accordingly.    The patient has a documented plan of care to address chronic pain. Eneida Bartholomew reports a pain score of 3/10.  Given her pain assessment as noted, treatment options were discussed and the following options were decided upon as a follow-up plan to address the patient's pain: continuation of current treatment plan for pain, educational materials on pain management, home exercises and therapy, referral to Physical Therapy, referral to specialist for assistance in pain treatment guidance, steroid injections, use of non-medical modalities (ice, heat, stretching and/or behavior modifications), and interventional pain management measures .               Pain Management Panel           No data to display                 JOVANA query complete. JOVANA reviewed by Javed Gillespie MD.     Pain Medications               aspirin 81 MG EC tablet Take 1 tablet by mouth Daily.    baclofen (LIORESAL) 10 MG tablet 1 tablet 3 (Three) Times a Day As Needed.    HYDROcodone-acetaminophen (NORCO) 5-325 MG per tablet Take 1 tablet every 4 hours by oral route as needed for 5 days.             No orders of the defined types were placed in this encounter.       Please note that portions of this note were completed with a voice recognition program.     Any copied data in any portion of my note from previous notes included in the HPI, PE, MDM and/or assessment and plan  has been reviewed by myself and accurate as of this date.     The 21st Century Cures Act makes medical notes like this available to patients in the interest of transparency. This is a medical document intended as peer to peer communication. It is written in medical language and may contain abbreviations or verbiage that are unfamiliar. It may appear blunt or direct. Medical documents are intended to carry relevant information, facts as evident, and the clinical opinion of the practitioner.     Javed Gillespie MD    Patient Care Team:  Regan Delgado MD as PCP - General (Internal Medicine)  Cari Rodriguez MD as Referring Physician (Internal Medicine)  Yoni Borja MD as Consulting Physician (Neurosurgery)     No orders of the defined types were placed in this encounter.        Future Appointments   Date Time Provider Department Center   2/26/2025  2:45 PM Cesar Funez MD [facility]

## 2024-09-10 ENCOUNTER — OFFICE VISIT (OUTPATIENT)
Dept: PAIN MEDICINE | Facility: CLINIC | Age: 74
End: 2024-09-10
Payer: MEDICARE

## 2024-09-10 VITALS — OXYGEN SATURATION: 98 % | HEIGHT: 63 IN | WEIGHT: 186.7 LBS | HEART RATE: 80 BPM | BODY MASS INDEX: 33.08 KG/M2

## 2024-09-10 DIAGNOSIS — M48.062 LUMBAR STENOSIS WITH NEUROGENIC CLAUDICATION: ICD-10-CM

## 2024-09-10 DIAGNOSIS — R26.9 GAIT DISTURBANCE: ICD-10-CM

## 2024-09-10 DIAGNOSIS — Z98.1 STATUS POST LUMBAR SPINAL FUSION: ICD-10-CM

## 2024-09-10 DIAGNOSIS — R53.81 PHYSICAL DECONDITIONING: ICD-10-CM

## 2024-09-10 DIAGNOSIS — M96.1 LUMBAR POSTLAMINECTOMY SYNDROME: ICD-10-CM

## 2024-09-10 PROCEDURE — 1160F RVW MEDS BY RX/DR IN RCRD: CPT | Performed by: ANESTHESIOLOGY

## 2024-09-10 PROCEDURE — 1159F MED LIST DOCD IN RCRD: CPT | Performed by: ANESTHESIOLOGY

## 2024-09-10 PROCEDURE — 1125F AMNT PAIN NOTED PAIN PRSNT: CPT | Performed by: ANESTHESIOLOGY

## 2024-09-10 PROCEDURE — 99204 OFFICE O/P NEW MOD 45 MIN: CPT | Performed by: ANESTHESIOLOGY

## 2024-09-11 DIAGNOSIS — M96.1 LUMBAR POSTLAMINECTOMY SYNDROME: Primary | ICD-10-CM

## 2024-09-13 ENCOUNTER — PATIENT ROUNDING (BHMG ONLY) (OUTPATIENT)
Dept: PAIN MEDICINE | Facility: CLINIC | Age: 74
End: 2024-09-13
Payer: MEDICARE

## 2024-09-13 NOTE — PROGRESS NOTES
A THANK YOU LETTER HAS BEEN MAILED TO THE PATIENT FOR PATIENT ROUNDING WITH [facility] [place] [facility].

## 2024-09-23 ENCOUNTER — OUTSIDE FACILITY SERVICE (OUTPATIENT)
Dept: PAIN MEDICINE | Facility: CLINIC | Age: 74
End: 2024-09-23
Payer: MEDICARE

## 2024-09-23 ENCOUNTER — DOCUMENTATION (OUTPATIENT)
Dept: PAIN MEDICINE | Facility: CLINIC | Age: 74
End: 2024-09-23

## 2024-09-23 PROCEDURE — 64483 NJX AA&/STRD TFRM EPI L/S 1: CPT | Performed by: ANESTHESIOLOGY

## 2024-10-31 ENCOUNTER — HOSPITAL ENCOUNTER (OUTPATIENT)
Dept: GENERAL RADIOLOGY | Facility: HOSPITAL | Age: 74
Discharge: HOME | End: 2024-10-31
Admitting: INTERNAL MEDICINE
Payer: MEDICARE

## 2024-10-31 ENCOUNTER — TRANSCRIBE ORDERS (OUTPATIENT)
Dept: ADMINISTRATIVE | Facility: HOSPITAL | Age: 74
End: 2024-10-31
Payer: MEDICARE

## 2024-10-31 ENCOUNTER — TELEPHONE (OUTPATIENT)
Dept: NEUROSURGERY | Facility: CLINIC | Age: 74
End: 2024-10-31
Payer: MEDICARE

## 2024-10-31 DIAGNOSIS — M54.50 LOW BACK PAIN, UNSPECIFIED BACK PAIN LATERALITY, UNSPECIFIED CHRONICITY, UNSPECIFIED WHETHER SCIATICA PRESENT: ICD-10-CM

## 2024-10-31 DIAGNOSIS — M54.50 LOW BACK PAIN, UNSPECIFIED BACK PAIN LATERALITY, UNSPECIFIED CHRONICITY, UNSPECIFIED WHETHER SCIATICA PRESENT: Primary | ICD-10-CM

## 2024-10-31 PROCEDURE — 72100 X-RAY EXAM L-S SPINE 2/3 VWS: CPT

## 2024-10-31 NOTE — TELEPHONE ENCOUNTER
Provider: DR MCKAY    Caller: AKILA CORCORAN    Relationship to Patient: SELF      Phone Number: [phone]    Reason for Call: PT STATES SHE IS HAVING A LOT OF PAIN.  SHE SAW HER FAMILY DOCTOR TODAY AND HE THINKS SHE HAS A STRESS FRACTURE IN ONE OF HER VERTEBRA, HE HAS ORDERED AN XRAY.    SHE STATES THIS STARTED ALMOST A WEEK AGO.  SHE HAS SEVERE SPASMS IN HER BACK.     SHE HAD ONE INJECTION 9-23-24.  IT DIDN'T HELP.    PLEASE ADVISE.    When was the patient last seen: 8-20-24    When did it start: FRIDAY AFTERNOON-PAIN HAS GOTTEN WORSE    Where is it located: BELOW WAIST ON LEFT SIDE TOWARD HER SPINE.    Characteristics of symptom/severity: PAIN IS A 10 PLUS, NO MATTER WHAT SHE DOES, THE PAIN DOESN'T GO AWAY    Timing- Is it constant or intermittent: CONSTANT, THE LAST COUPLE OF DAYS    What makes it worse: WALKING, STANDING UP    What makes it better: NOTHING    What therapies/medications have you tried: SALONPAS PATCHES-THEY WERE HELPING, THEY AREN'T ANYMORE  DR RAINES GAVE HER A PRESCRIPTION OF PAIN PILLS-TYLENOL WITH CODEINE, SHE WILL BE PICKING THOSE UP SOON.

## 2024-11-01 NOTE — TELEPHONE ENCOUNTER
Provider:  Jonh  Surgery/Procedure:  Lumbar fusion extension decompression with pedicle screws L3-4, with exploration L2-3, left, discectomy  Surgery/Procedure Date:  1/3/22  Last visit:   8/20/24  Next visit: NA     Reason for call:  Patient reports worsening low back pain down to the left side at the waistline. She also has severe muscle spasms in the same location - she is taking baclofen 10mg TID but has been on that for many years for fibromyalgia. The pain does on occasion radiate with a shocking pain into her left buttock. Denies any new numbness or tingling, or any weakness. Pain worsens with walking or standing, and she does report having injections with pain management on 9/23/24 however that was not beneficial. Her PCP prescribed Tylenol #3 yesterday and that has helped some with her back pain. Her PCP also ordered a new lumbar spine xray as he was concerned for possible fracture - XR Spine Lumbar 2 or 3 View (10/31/2024 16:34).

## 2024-11-05 ENCOUNTER — OFFICE VISIT (OUTPATIENT)
Dept: NEUROSURGERY | Facility: CLINIC | Age: 74
End: 2024-11-05
Payer: MEDICARE

## 2024-11-05 VITALS — HEIGHT: 63 IN | BODY MASS INDEX: 33.13 KG/M2 | TEMPERATURE: 98 F | WEIGHT: 187 LBS

## 2024-11-05 DIAGNOSIS — Z98.1 S/P LUMBAR FUSION: ICD-10-CM

## 2024-11-05 DIAGNOSIS — M48.062 SPINAL STENOSIS OF LUMBAR REGION WITH NEUROGENIC CLAUDICATION: Primary | ICD-10-CM

## 2024-11-05 PROCEDURE — 99214 OFFICE O/P EST MOD 30 MIN: CPT | Performed by: NEUROLOGICAL SURGERY

## 2024-11-05 PROCEDURE — 1159F MED LIST DOCD IN RCRD: CPT | Performed by: NEUROLOGICAL SURGERY

## 2024-11-05 PROCEDURE — 1160F RVW MEDS BY RX/DR IN RCRD: CPT | Performed by: NEUROLOGICAL SURGERY

## 2024-11-05 NOTE — PROGRESS NOTES
Patient: Eneida Bartholomew  : 1950    Primary Care Provider: Regan Delgado MD    Requesting Provider: As above        History    Chief Complaint: Low back and lower extremity pain.    History of Present Illness: Ms. Bartholomew is a 74-year-old woman who is known to my service.  On 2019 she underwent right L3-4 foraminotomy as well as L4-5 PLIF.  She did well but developed recurrent symptoms and on 1/3/2022 she underwent PLIF extension to the L3-4 level with foraminotomies and discectomy on the left at L2-3.  Once again, she did really well postoperatively.  Over the last 9 months she has developed progressive low back pain that extends into the lower extremities-the anterior thighs specifically.  She had an epidural injection which was not particularly helpful.  She has done some therapy.  She was largely getting by but about a week ago her symptoms increased dramatically.  She has rather global pain in her legs.  She previously would get relief by lying down but that is less the case now.    Review of Systems   Constitutional:  Negative for activity change, appetite change, chills, diaphoresis, fatigue, fever and unexpected weight change.   HENT:  Negative for congestion, dental problem, drooling, ear discharge, ear pain, facial swelling, hearing loss, mouth sores, nosebleeds, postnasal drip, rhinorrhea, sinus pressure, sinus pain, sneezing, sore throat, tinnitus, trouble swallowing and voice change.    Eyes:  Negative for photophobia, pain, discharge, redness, itching and visual disturbance.   Respiratory:  Negative for apnea, cough, choking, chest tightness, shortness of breath, wheezing and stridor.    Cardiovascular:  Negative for chest pain, palpitations and leg swelling.   Gastrointestinal:  Negative for abdominal distention, abdominal pain, anal bleeding, blood in stool, constipation, diarrhea, nausea, rectal pain and vomiting.   Endocrine: Negative for cold intolerance, heat intolerance,  "polydipsia, polyphagia and polyuria.   Genitourinary:  Negative for decreased urine volume, difficulty urinating, dyspareunia, dysuria, enuresis, flank pain, frequency, genital sores, hematuria, menstrual problem, pelvic pain, urgency, vaginal bleeding, vaginal discharge and vaginal pain.   Musculoskeletal:  Positive for back pain and gait problem. Negative for arthralgias, joint swelling, myalgias, neck pain and neck stiffness.   Skin:  Negative for color change, pallor, rash and wound.   Allergic/Immunologic: Negative for environmental allergies, food allergies and immunocompromised state.   Neurological:  Negative for dizziness, tremors, seizures, syncope, facial asymmetry, speech difficulty, weakness, light-headedness, numbness and headaches.   Hematological:  Negative for adenopathy. Does not bruise/bleed easily.   Psychiatric/Behavioral:  Negative for agitation, behavioral problems, confusion, decreased concentration, dysphoric mood, hallucinations, self-injury, sleep disturbance and suicidal ideas. The patient is not nervous/anxious and is not hyperactive.      The patient's past medical history, past surgical history, family history, and social history have been reviewed at length in the electronic medical record.      Physical Exam:   Ht 160 cm (63\")   Wt 84.8 kg (187 lb)   BMI 33.13 kg/m²   Straight leg raising induces severe back and hip pain bilaterally.    Medical Decision Making    Data Review:   (All imaging studies were personally reviewed unless stated otherwise)  Plain films dated 10/31/2024 demonstrate her construct to be intact from L3-5.  There is some mild angulation in the coronal plane at L2-3 where there is to space narrowing.    Diagnosis:   Lumbar transition syndrome with instability.    Treatment Options:   I am going to check an MRI of her lumbar spine with and without gadolinium.  She will follow-up thereafter.  She may well likely require additional decompression with fusion " extension to the L2-3 level.  At this point symptoms extend all the way down her legs so we will also take a close look at the L5-S1 level.          I, Dr. Borja, personally performed the services described in the documentation, as scribed in my presence, and it is both accurate and complete.

## 2024-11-19 ENCOUNTER — HOSPITAL ENCOUNTER (OUTPATIENT)
Facility: HOSPITAL | Age: 74
Discharge: HOME | End: 2024-11-19
Admitting: NEUROLOGICAL SURGERY
Payer: MEDICARE

## 2024-11-19 DIAGNOSIS — M48.062 SPINAL STENOSIS OF LUMBAR REGION WITH NEUROGENIC CLAUDICATION: ICD-10-CM

## 2024-11-19 DIAGNOSIS — Z98.1 S/P LUMBAR FUSION: ICD-10-CM

## 2024-11-19 PROCEDURE — 25510000002 GADOBENATE DIMEGLUMINE 529 MG/ML SOLUTION: Performed by: NEUROLOGICAL SURGERY

## 2024-11-19 PROCEDURE — 72158 MRI LUMBAR SPINE W/O & W/DYE: CPT

## 2024-11-19 PROCEDURE — A9577 INJ MULTIHANCE: HCPCS | Performed by: NEUROLOGICAL SURGERY

## 2024-11-19 RX ADMIN — GADOBENATE DIMEGLUMINE 15 ML: 529 INJECTION, SOLUTION INTRAVENOUS at 09:18

## 2024-11-20 ENCOUNTER — OFFICE VISIT (OUTPATIENT)
Dept: NEUROSURGERY | Facility: CLINIC | Age: 74
End: 2024-11-20
Payer: MEDICARE

## 2024-11-20 ENCOUNTER — PREP FOR SURGERY (OUTPATIENT)
Dept: OTHER | Facility: HOSPITAL | Age: 74
End: 2024-11-20
Payer: MEDICARE

## 2024-11-20 VITALS — BODY MASS INDEX: 33.01 KG/M2 | TEMPERATURE: 97.7 F | WEIGHT: 186.3 LBS | HEIGHT: 63 IN

## 2024-11-20 DIAGNOSIS — M48.062 SPINAL STENOSIS OF LUMBAR REGION WITH NEUROGENIC CLAUDICATION: Primary | ICD-10-CM

## 2024-11-20 DIAGNOSIS — M54.9 MECHANICAL BACK PAIN: ICD-10-CM

## 2024-11-20 DIAGNOSIS — M48.062 LUMBAR STENOSIS WITH NEUROGENIC CLAUDICATION: Primary | ICD-10-CM

## 2024-11-20 DIAGNOSIS — Z98.1 S/P LUMBAR FUSION: ICD-10-CM

## 2024-11-20 PROCEDURE — 99214 OFFICE O/P EST MOD 30 MIN: CPT | Performed by: NEUROLOGICAL SURGERY

## 2024-11-20 PROCEDURE — 1160F RVW MEDS BY RX/DR IN RCRD: CPT | Performed by: NEUROLOGICAL SURGERY

## 2024-11-20 PROCEDURE — 1159F MED LIST DOCD IN RCRD: CPT | Performed by: NEUROLOGICAL SURGERY

## 2024-11-20 RX ORDER — FAMOTIDINE 20 MG/1
20 TABLET, FILM COATED ORAL
OUTPATIENT
Start: 2024-11-20

## 2024-11-20 RX ORDER — ACETAMINOPHEN 500 MG
1000 TABLET ORAL ONCE
OUTPATIENT
Start: 2024-11-20 | End: 2024-11-20

## 2024-11-20 RX ORDER — CHLORHEXIDINE GLUCONATE 40 MG/ML
SOLUTION TOPICAL
Qty: 120 ML | Refills: 0 | Status: SHIPPED | OUTPATIENT
Start: 2024-11-20

## 2024-11-20 RX ORDER — IBUPROFEN 800 MG/1
800 TABLET, FILM COATED ORAL ONCE
OUTPATIENT
Start: 2024-11-20 | End: 2024-11-20

## 2024-11-20 RX ORDER — OXYCODONE HCL 10 MG/1
10 TABLET, FILM COATED, EXTENDED RELEASE ORAL ONCE
OUTPATIENT
Start: 2024-11-20 | End: 2024-11-20

## 2024-11-20 NOTE — PROGRESS NOTES
Patient: Eneida Bartholomew  : 1950    Primary Care Provider: Regan Delgado MD    Requesting Provider: As above        History    Chief Complaint: Low back and bilateral lower extremity pain.    History of Present Illness: Ms. Bartholomew is a 74-year-old woman who is known to my service.  On 2019 she underwent right L3-4 foraminotomy as well as L4-5 PLIF.  She did well but developed recurrent symptoms and on 1/3/2022 she underwent PLIF extension to the L3-4 level with foraminotomies and discectomy on the left at L2-3.  Once again, she did really well postoperatively.  Over the last 9 months she has developed progressive low back pain that extends into the lower extremities-the anterior thighs specifically.  She had an epidural injection which was not particularly helpful.  She has done some therapy.  She was largely getting by but about a week ago her symptoms increased dramatically.  She has rather global pain in her legs.  She previously would get relief by lying down but that is less the case now.  Dr. Funez is her cardiologist.  She is due to see him in February with a stress test.    Review of Systems   Constitutional:  Negative for activity change, appetite change, chills, diaphoresis, fatigue, fever and unexpected weight change.   HENT:  Negative for congestion, dental problem, drooling, ear discharge, ear pain, facial swelling, hearing loss, mouth sores, nosebleeds, postnasal drip, rhinorrhea, sinus pressure, sinus pain, sneezing, sore throat, tinnitus, trouble swallowing and voice change.    Eyes:  Negative for photophobia, pain, discharge, redness, itching and visual disturbance.   Respiratory:  Negative for apnea, cough, choking, chest tightness, shortness of breath, wheezing and stridor.    Cardiovascular:  Negative for chest pain, palpitations and leg swelling.   Gastrointestinal:  Negative for abdominal distention, abdominal pain, anal bleeding, blood in stool, constipation, diarrhea,  "nausea, rectal pain and vomiting.   Endocrine: Negative for cold intolerance, heat intolerance, polydipsia, polyphagia and polyuria.   Genitourinary:  Negative for decreased urine volume, difficulty urinating, dyspareunia, dysuria, enuresis, flank pain, frequency, genital sores, hematuria, menstrual problem, pelvic pain, urgency, vaginal bleeding, vaginal discharge and vaginal pain.   Musculoskeletal:  Positive for back pain and gait problem. Negative for arthralgias, joint swelling, myalgias, neck pain and neck stiffness.   Skin:  Negative for color change, pallor, rash and wound.   Allergic/Immunologic: Negative for environmental allergies, food allergies and immunocompromised state.   Neurological:  Positive for weakness and numbness. Negative for dizziness, tremors, seizures, syncope, facial asymmetry, speech difficulty, light-headedness and headaches.   Hematological:  Negative for adenopathy. Does not bruise/bleed easily.   Psychiatric/Behavioral:  Negative for agitation, behavioral problems, confusion, decreased concentration, dysphoric mood, hallucinations, self-injury, sleep disturbance and suicidal ideas. The patient is not nervous/anxious and is not hyperactive.      The patient's past medical history, past surgical history, family history, and social history have been reviewed at length in the electronic medical record.      Physical Exam:   Temp 97.7 °F (36.5 °C) (Infrared)   Ht 160 cm (62.99\")   Wt 84.5 kg (186 lb 4.8 oz)   BMI 33.01 kg/m²   Deferred    Medical Decision Making    Data Review:   (All imaging studies were personally reviewed unless stated otherwise)  Plain films dated 10/31/2024 demonstrate her construct to be intact from L3-5.  There is some mild angulation in the coronal plane at L2-3 where there is to space narrowing.    Once again CT myelogram study from August of this year demonstrates canal and root compromise at L2-3 above her prior construct.  She has a PEEK construct in place " from L3-5.  She has solidly fused at L3-4 and L4-5.  I do not see overt translational instability at any level including L5-S1.    MRI of the lumbar spine dated 11/19/2024 confirms the above and demonstrates generous stenosis at L2-3.  There may be a trace offset of L5 on S1 but there is no root or canal compromise.    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    Treatment Options:   I have recommended additional decompression and fusion extension to the L2-3 level.  Hardware at L4-5 can likely be removed.  The nature of the procedure as well as the potential risks, complications, limitations, and alternatives to the procedure were discussed at length with the patient and the patient has agreed to proceed with surgery.  Formal cardiac clearance will be necessary.  If necessary hopefully she can be seen sooner by cardiology.      Scribed for Yoni Borja MD by Radha Nova MA on 11/20/2024 10:16 EST      I, Dr. Borja, personally performed the services described in the documentation, as scribed in my presence, and it is both accurate and complete.

## 2024-11-20 NOTE — H&P
Patient: Eneida Bartholomew  : 1950     Primary Care Provider: Regan Delgado MD     Requesting Provider: As above           History     Chief Complaint: Low back and bilateral lower extremity pain.     History of Present Illness: Ms. Bartholomew is a 74-year-old woman who is known to my service.  On 2019 she underwent right L3-4 foraminotomy as well as L4-5 PLIF.  She did well but developed recurrent symptoms and on 1/3/2022 she underwent PLIF extension to the L3-4 level with foraminotomies and discectomy on the left at L2-3.  Once again, she did really well postoperatively.  Over the last 9 months she has developed progressive low back pain that extends into the lower extremities-the anterior thighs specifically.  She had an epidural injection which was not particularly helpful.  She has done some therapy.  She was largely getting by but about a week ago her symptoms increased dramatically.  She has rather global pain in her legs.  She previously would get relief by lying down but that is less the case now.  Dr. Funez is her cardiologist.  She is due to see him in February with a stress test.     Review of Systems   Constitutional:  Negative for activity change, appetite change, chills, diaphoresis, fatigue, fever and unexpected weight change.   HENT:  Negative for congestion, dental problem, drooling, ear discharge, ear pain, facial swelling, hearing loss, mouth sores, nosebleeds, postnasal drip, rhinorrhea, sinus pressure, sinus pain, sneezing, sore throat, tinnitus, trouble swallowing and voice change.    Eyes:  Negative for photophobia, pain, discharge, redness, itching and visual disturbance.   Respiratory:  Negative for apnea, cough, choking, chest tightness, shortness of breath, wheezing and stridor.    Cardiovascular:  Negative for chest pain, palpitations and leg swelling.   Gastrointestinal:  Negative for abdominal distention, abdominal pain, anal bleeding, blood in stool, constipation,  diarrhea, nausea, rectal pain and vomiting.   Endocrine: Negative for cold intolerance, heat intolerance, polydipsia, polyphagia and polyuria.   Genitourinary:  Negative for decreased urine volume, difficulty urinating, dyspareunia, dysuria, enuresis, flank pain, frequency, genital sores, hematuria, menstrual problem, pelvic pain, urgency, vaginal bleeding, vaginal discharge and vaginal pain.   Musculoskeletal:  Positive for back pain and gait problem. Negative for arthralgias, joint swelling, myalgias, neck pain and neck stiffness.   Skin:  Negative for color change, pallor, rash and wound.   Allergic/Immunologic: Negative for environmental allergies, food allergies and immunocompromised state.   Neurological:  Positive for weakness and numbness. Negative for dizziness, tremors, seizures, syncope, facial asymmetry, speech difficulty, light-headedness and headaches.   Hematological:  Negative for adenopathy. Does not bruise/bleed easily.   Psychiatric/Behavioral:  Negative for agitation, behavioral problems, confusion, decreased concentration, dysphoric mood, hallucinations, self-injury, sleep disturbance and suicidal ideas. The patient is not nervous/anxious and is not hyperactive.       The patient's past medical history, past surgical history, family history, and social history have been reviewed at length in the electronic medical record.     Past Medical History:   Diagnosis Date    Arthritis     Arthritis     Bronchitis     Chicken pox     Coronary artery disease     Fibromyalgia     GERD (gastroesophageal reflux disease)     Glaucoma     Glaucoma     History of cardiac cath     History of echocardiogram     History of Holter monitoring     History of migraine     History of PTCA     History of stress test     Hyperlipidemia     Hypertension     Low back pain     Lumbosacral disc disease     Measles     Stomach ulcer     UTI (urinary tract infection)      Past Surgical History:   Procedure Laterality Date     BREAST BIOPSY Right     BUNIONECTOMY Bilateral     x4    CATARACT EXTRACTION EXTRACAPSULAR W/ INTRAOCULAR LENS IMPLANTATION Bilateral     CHOLECYSTECTOMY      CORONARY ANGIOPLASTY WITH STENT PLACEMENT      EYE SURGERY Bilateral     CATARACTS REMOVED    KNEE ARTHROSCOPY Right 2021    LUMBAR LAMINECTOMY WITH FUSION N/A 5/2/2019    Procedure: LUMBAR FUSION DECOMPRESSON WITH PEDICLE SCREWS L4-5, LAMINECTOMY, FORAMINOTOMY RIGHT L3-4;  Surgeon: Yoni Borja MD;  Location:  [facility];  Service: Neurosurgery    LUMBAR LAMINECTOMY WITH FUSION N/A 1/3/2022    Procedure: LUMBAR FUSION extension DECOMPRESSON WITH PEDICLE SCREWS L3-4, with exploration L2-3, left, discectomy;  Surgeon: Yoni Borja MD;  Location:  [facility];  Service: Neurosurgery;  Laterality: N/A;     Family History   Problem Relation Age of Onset    Cancer Mother     Heart disease Father      Social History     Socioeconomic History    Marital status:      Spouse name: Claude   Tobacco Use    Smoking status: Never     Passive exposure: Never    Smokeless tobacco: Never   Vaping Use    Vaping status: Never Used   Substance and Sexual Activity    Alcohol use: No    Drug use: No    Sexual activity: Defer           Allergies   Allergen Reactions    Other Shortness Of Breath     Lavendar scent caused SOA and swelling of throat    Prednisone Shortness Of Breath     And nervous    Shellfish-Derived Products Anaphylaxis, Rash, Shortness Of Breath and Swelling     ALL SEAFOOD, FISH    Gabapentin Unknown - Low Severity    Iodinated Contrast Media Other (See Comments)     Other reaction(s): Feels unwell, Hypotension    Tramadol Unknown - Low Severity    Erythromycin Rash    Omeprazole Rash    Sulfa Antibiotics Rash       Current Outpatient Medications on File Prior to Visit   Medication Sig Dispense Refill    amLODIPine (NORVASC) 10 MG tablet Take 1 tablet by mouth Daily.      aspirin 81 MG EC tablet Take 1 tablet by mouth Daily.      baclofen (LIORESAL)  "10 MG tablet 1 tablet 3 (Three) Times a Day As Needed.      benazepril (LOTENSIN) 40 MG tablet Take 1 tablet by mouth Daily.      Cholecalciferol 25 MCG (1000 UT) tablet Take 1 tablet by mouth Daily.      Flaxseed, Linseed, (FLAX SEED OIL PO) Take  by mouth.      fluticasone (FLONASE) 50 MCG/ACT nasal spray 1 spray.      hydroCHLOROthiazide (HYDRODIURIL) 25 MG tablet Take 1 tablet by mouth Daily.      HYDROcodone-acetaminophen (NORCO) 5-325 MG per tablet Take 1 tablet every 4 hours by oral route as needed for 5 days.      latanoprost (XALATAN) 0.005 % ophthalmic solution       meclizine (ANTIVERT) 12.5 MG tablet 1 tablet.      metoprolol succinate XL (TOPROL-XL) 100 MG 24 hr tablet 1 tablet Daily.      Multiple Vitamins-Minerals (MULTIVITAL PO) Take 1 tablet by mouth Daily.      omeprazole (priLOSEC) 40 MG capsule 1 capsule.      oxybutynin XL (DITROPAN-XL) 10 MG 24 hr tablet Take 1 tablet by mouth Daily.      pantoprazole (PROTONIX) 40 MG EC tablet       rosuvastatin (CRESTOR) 40 MG tablet Take 1 tablet by mouth Every Night.      vitamin C (ASCORBIC ACID) 250 MG tablet Take 1 tablet by mouth Daily.      vitamin E 400 UNIT capsule Take 1 capsule by mouth Daily.       No current facility-administered medications on file prior to visit.          Physical Exam:   Temp 97.7 °F (36.5 °C) (Infrared)   Ht 160 cm (62.99\")   Wt 84.5 kg (186 lb 4.8 oz)   BMI 33.01 kg/m²   Deferred     Medical Decision Making     Data Review:   (All imaging studies were personally reviewed unless stated otherwise)  Plain films dated 10/31/2024 demonstrate her construct to be intact from L3-5.  There is some mild angulation in the coronal plane at L2-3 where there is to space narrowing.     Once again CT myelogram study from August of this year demonstrates canal and root compromise at L2-3 above her prior construct.  She has a PEEK construct in place from L3-5.  She has solidly fused at L3-4 and L4-5.  I do not see overt translational " instability at any level including L5-S1.     MRI of the lumbar spine dated 11/19/2024 confirms the above and demonstrates generous stenosis at L2-3.  There may be a trace offset of L5 on S1 but there is no root or canal compromise.     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     Treatment Options:   I have recommended additional decompression and fusion extension to the L2-3 level.  Hardware at L4-5 can likely be removed.  The nature of the procedure as well as the potential risks, complications, limitations, and alternatives to the procedure were discussed at length with the patient and the patient has agreed to proceed with surgery.  Formal cardiac clearance will be necessary.  If necessary hopefully she can be seen sooner by cardiology.

## 2024-11-21 ENCOUNTER — TELEPHONE (OUTPATIENT)
Dept: CARDIOLOGY | Facility: CLINIC | Age: 74
End: 2024-11-21
Payer: MEDICARE

## 2024-11-21 NOTE — TELEPHONE ENCOUNTER
Requesting pre op/procedure risk assessment for lumbar fusion with Dr. Borja.     Last appt 02/21/2024:  Assessment & Plan  Diagnoses and all orders for this visit:     1. Coronary artery disease involving native coronary artery of native heart without angina pectoris (Primary)        1.  Coronary artery disease, nonflow-limiting most recent cath 3-1/2 years ago.  No angina though poor functional capacity due to podiatry issues  2.  Hypertension well-controlled on 4 drugs  3.  Dyslipidemia on high-dose statin     Recommendations:  1.  Continue current medical therapy  2.  Encourage exercise as tolerated  3.  Rec adenosine myocardial perfusion study prior to next visit next year

## 2024-11-26 ENCOUNTER — HOSPITAL ENCOUNTER (OUTPATIENT)
Facility: HOSPITAL | Age: 74
Discharge: HOME | End: 2024-11-26
Payer: MEDICARE

## 2024-11-26 DIAGNOSIS — I25.10 CORONARY ARTERY DISEASE INVOLVING NATIVE CORONARY ARTERY OF NATIVE HEART, UNSPECIFIED WHETHER ANGINA PRESENT: ICD-10-CM

## 2024-11-26 LAB
BH CV REST NUCLEAR ISOTOPE DOSE: 9.1 MCI
BH CV STRESS BP STAGE 2: NORMAL
BH CV STRESS BP STAGE 4: NORMAL
BH CV STRESS COMMENTS STAGE 1: NORMAL
BH CV STRESS DOSE REGADENOSON STAGE 1: 0.4
BH CV STRESS DURATION MIN STAGE 1: 1
BH CV STRESS DURATION MIN STAGE 2: 1
BH CV STRESS DURATION MIN STAGE 3: 1
BH CV STRESS DURATION MIN STAGE 4: 1
BH CV STRESS DURATION SEC STAGE 1: 0
BH CV STRESS DURATION SEC STAGE 2: 0
BH CV STRESS DURATION SEC STAGE 3: 0
BH CV STRESS DURATION SEC STAGE 4: 0
BH CV STRESS HR STAGE 1: 90
BH CV STRESS HR STAGE 2: 108
BH CV STRESS HR STAGE 3: 105
BH CV STRESS HR STAGE 4: 107
BH CV STRESS NUCLEAR ISOTOPE DOSE: 30.7 MCI
BH CV STRESS O2 STAGE 1: 98
BH CV STRESS O2 STAGE 2: 97
BH CV STRESS O2 STAGE 3: 98
BH CV STRESS O2 STAGE 4: 98
BH CV STRESS PROTOCOL 1: NORMAL
BH CV STRESS RECOVERY BP: NORMAL MMHG
BH CV STRESS RECOVERY HR: 103 BPM
BH CV STRESS RECOVERY O2: 98 %
BH CV STRESS STAGE 1: 1
BH CV STRESS STAGE 2: 2
BH CV STRESS STAGE 3: 3
BH CV STRESS STAGE 4: 4
LV EF NUC BP: 73 %
MAXIMAL PREDICTED HEART RATE: 144 BPM
PERCENT MAX PREDICTED HR: 75 %
STRESS BASELINE BP: NORMAL MMHG
STRESS BASELINE HR: 80 BPM
STRESS O2 SAT REST: 97 %
STRESS PERCENT HR: 88 %
STRESS POST ESTIMATED WORKLOAD: 1 METS
STRESS POST EXERCISE DUR MIN: 4 MIN
STRESS POST EXERCISE DUR SEC: 0 SEC
STRESS POST O2 SAT PEAK: 98 %
STRESS POST PEAK BP: NORMAL MMHG
STRESS POST PEAK HR: 108 BPM
STRESS TARGET HR: 122 BPM

## 2024-11-26 PROCEDURE — 25010000002 REGADENOSON 0.4 MG/5ML SOLUTION: Performed by: INTERNAL MEDICINE

## 2024-11-26 PROCEDURE — 34310000005 TECHNETIUM SESTAMIBI: Performed by: INTERNAL MEDICINE

## 2024-11-26 PROCEDURE — 78452 HT MUSCLE IMAGE SPECT MULT: CPT

## 2024-11-26 PROCEDURE — A9500 TC99M SESTAMIBI: HCPCS | Performed by: INTERNAL MEDICINE

## 2024-11-26 PROCEDURE — 93017 CV STRESS TEST TRACING ONLY: CPT

## 2024-11-26 RX ORDER — REGADENOSON 0.08 MG/ML
0.4 INJECTION, SOLUTION INTRAVENOUS ONCE
Status: COMPLETED | OUTPATIENT
Start: 2024-11-26 | End: 2024-11-26

## 2024-11-26 RX ADMIN — REGADENOSON 0.4 MG: 0.08 INJECTION INTRAVENOUS at 13:46

## 2024-11-26 RX ADMIN — TECHNETIUM TC 99M SESTAMIBI 1 DOSE: 1 INJECTION INTRAVENOUS at 12:25

## 2024-11-26 RX ADMIN — TECHNETIUM TC 99M SESTAMIBI 1 DOSE: 1 INJECTION INTRAVENOUS at 13:45

## 2024-11-27 ENCOUNTER — TELEPHONE (OUTPATIENT)
Dept: CARDIOLOGY | Facility: CLINIC | Age: 74
End: 2024-11-27
Payer: MEDICARE

## 2024-11-27 NOTE — TELEPHONE ENCOUNTER
Spoke with patient, advised of below.  Advised note placed in chart for risk assessment for upcoming procedure.  ----- Message from Cesar Funez sent at 11/27/2024  9:26 AM EST -----  Stress test is normal.  Can discuss further at revisit

## 2024-12-30 ENCOUNTER — PRE-ADMISSION TESTING (OUTPATIENT)
Dept: PREADMISSION TESTING | Facility: HOSPITAL | Age: 74
End: 2024-12-30
Payer: MEDICARE

## 2024-12-30 VITALS — HEIGHT: 63 IN | WEIGHT: 183.64 LBS | BODY MASS INDEX: 32.54 KG/M2

## 2024-12-30 DIAGNOSIS — M48.062 LUMBAR STENOSIS WITH NEUROGENIC CLAUDICATION: ICD-10-CM

## 2024-12-30 LAB
DEPRECATED RDW RBC AUTO: 37.9 FL (ref 37–54)
ERYTHROCYTE [DISTWIDTH] IN BLOOD BY AUTOMATED COUNT: 12.5 % (ref 12.3–15.4)
HBA1C MFR BLD: 6.3 % (ref 4.8–5.6)
HCT VFR BLD AUTO: 39.9 % (ref 34–46.6)
HGB BLD-MCNC: 13.8 G/DL (ref 12–15.9)
MCH RBC QN AUTO: 29 PG (ref 26.6–33)
MCHC RBC AUTO-ENTMCNC: 34.6 G/DL (ref 31.5–35.7)
MCV RBC AUTO: 83.8 FL (ref 79–97)
PLATELET # BLD AUTO: 224 10*3/MM3 (ref 140–450)
PMV BLD AUTO: 10.1 FL (ref 6–12)
POTASSIUM SERPL-SCNC: 3.5 MMOL/L (ref 3.5–5.2)
QT INTERVAL: 432 MS
QTC INTERVAL: 466 MS
RBC # BLD AUTO: 4.76 10*6/MM3 (ref 3.77–5.28)
WBC NRBC COR # BLD AUTO: 5.55 10*3/MM3 (ref 3.4–10.8)

## 2024-12-30 PROCEDURE — 85027 COMPLETE CBC AUTOMATED: CPT

## 2024-12-30 PROCEDURE — 87081 CULTURE SCREEN ONLY: CPT

## 2024-12-30 PROCEDURE — 36415 COLL VENOUS BLD VENIPUNCTURE: CPT

## 2024-12-30 PROCEDURE — 93005 ELECTROCARDIOGRAM TRACING: CPT

## 2024-12-30 PROCEDURE — 83036 HEMOGLOBIN GLYCOSYLATED A1C: CPT

## 2024-12-30 PROCEDURE — 84132 ASSAY OF SERUM POTASSIUM: CPT

## 2024-12-30 NOTE — PAT
An arrival time for procedure was not provided during PAT visit. If patient had any questions or concerns about their arrival time, they were instructed to contact their surgeon/physician.  Additionally, if the patient referred to an arrival time that was acquired from their my chart account, patient was encouraged to verify that time with their surgeon/physician. Arrival times are NOT provided in Pre Admission Testing Department.    Patient viewed general PAT education video as instructed in their preoperative information received from their surgeon.  Patient stated the general PAT education video was viewed in its entirety and survey completed.  Copies of PAT general education handouts (Incentive Spirometry, Meds to Beds Program, Patient Belongings, Pre-op skin preparation instructions, Blood Glucose testing, Visitor policy, Surgery FAQ, Code H) distributed to patient if not printed. Education related to the PAT pass and skin preparation for surgery (if applicable) completed in PAT as a reinforcement to PAT education video. Patient instructed to return PAT pass provided today as well as completed skin preparation sheet (if applicable) on the day of procedure.     Additionally if patient had not viewed video yet but intended to view it at home or in our waiting area, then referred them to the handout with QR code/link provided during PAT visit.  Encouraged patient/family to read PAT general education handouts thoroughly and notify PAT staff with any questions or concerns. Patient verbalized understanding of all information and priority content.    Per Anesthesia Request, patient instructed not to take their ACE/ARB medications on the AM of surgery.    Patient denies any current skin issues.     Patient instructed to drink 20 ounces of Gatorade or Gatorlyte (if diabetic) and it needs to be completed 1 hour (for Main OR patients) or 2 hours (scheduled  section & [facility] patients) before given arrival time for  procedure (NO RED Gatorade and NO Gatorade Zero).    Patient verbalized understanding.    Bactroban (if prescribed) and Chlorhexidine Prescription prescribed by physician before PAT visit.  Verified with patient that medication(s) were picked up from their pharmacy.  Written instructions given to patient during PAT visit.  Patient/family also instructed to complete skin prep checklist and return the checklist on the day of surgery to preoperative staff.  Patient/family verbalized understanding.    Patient to apply Chlorhexadine wipes  to surgical area (as instructed) the night before procedure and the AM of procedure. Wipes provided.    Verified patient previously completed cardiology visit for cardiac risk assessment in preparation for upcoming procedure, completion of 12-lead ECG within six months, and risk assessment letter reviewed. No further interventions required.

## 2024-12-31 LAB — MRSA SPEC QL CULT: NORMAL

## 2025-01-08 ENCOUNTER — PREP FOR SURGERY (OUTPATIENT)
Dept: OTHER | Facility: HOSPITAL | Age: 75
End: 2025-01-08
Payer: MEDICARE

## 2025-01-08 DIAGNOSIS — M48.061 LUMBAR SPINAL STENOSIS DUE TO ADJACENT SEGMENT DISEASE AFTER FUSION PROCEDURE: Primary | ICD-10-CM

## 2025-01-08 DIAGNOSIS — M51.369 LUMBAR SPINAL STENOSIS DUE TO ADJACENT SEGMENT DISEASE AFTER FUSION PROCEDURE: Primary | ICD-10-CM

## 2025-01-08 DIAGNOSIS — Z98.1 LUMBAR SPINAL STENOSIS DUE TO ADJACENT SEGMENT DISEASE AFTER FUSION PROCEDURE: Primary | ICD-10-CM

## 2025-01-08 RX ORDER — FAMOTIDINE 20 MG/1
20 TABLET, FILM COATED ORAL
OUTPATIENT
Start: 2025-01-08

## 2025-01-08 RX ORDER — CHLORHEXIDINE GLUCONATE 40 MG/ML
SOLUTION TOPICAL
Qty: 120 ML | Refills: 0 | Status: SHIPPED | OUTPATIENT
Start: 2025-01-08

## 2025-01-08 NOTE — H&P
Yoni Borja MD   Physician  Specialty: Neurosurgery     Progress Notes      Signed     Encounter Date: 2024     Signed         Patient: Eneida Bartholomew  : 1950     Primary Care Provider: Regan Delgado MD     Requesting Provider: As above           History     Chief Complaint: Low back and bilateral lower extremity pain.     History of Present Illness: Ms. Bartholomew is a 74-year-old woman who is known to my service.  On 2019 she underwent right L3-4 foraminotomy as well as L4-5 PLIF.  She did well but developed recurrent symptoms and on 1/3/2022 she underwent PLIF extension to the L3-4 level with foraminotomies and discectomy on the left at L2-3.  Once again, she did really well postoperatively.  Over the last 9 months she has developed progressive low back pain that extends into the lower extremities-the anterior thighs specifically.  She had an epidural injection which was not particularly helpful.  She has done some therapy.  She was largely getting by but about a week ago her symptoms increased dramatically.  She has rather global pain in her legs.  She previously would get relief by lying down but that is less the case now.  Dr. Funez is her cardiologist.  She is due to see him in February with a stress test.     Review of Systems   Constitutional:  Negative for activity change, appetite change, chills, diaphoresis, fatigue, fever and unexpected weight change.   HENT:  Negative for congestion, dental problem, drooling, ear discharge, ear pain, facial swelling, hearing loss, mouth sores, nosebleeds, postnasal drip, rhinorrhea, sinus pressure, sinus pain, sneezing, sore throat, tinnitus, trouble swallowing and voice change.    Eyes:  Negative for photophobia, pain, discharge, redness, itching and visual disturbance.   Respiratory:  Negative for apnea, cough, choking, chest tightness, shortness of breath, wheezing and stridor.    Cardiovascular:  Negative for chest pain,  "palpitations and leg swelling.   Gastrointestinal:  Negative for abdominal distention, abdominal pain, anal bleeding, blood in stool, constipation, diarrhea, nausea, rectal pain and vomiting.   Endocrine: Negative for cold intolerance, heat intolerance, polydipsia, polyphagia and polyuria.   Genitourinary:  Negative for decreased urine volume, difficulty urinating, dyspareunia, dysuria, enuresis, flank pain, frequency, genital sores, hematuria, menstrual problem, pelvic pain, urgency, vaginal bleeding, vaginal discharge and vaginal pain.   Musculoskeletal:  Positive for back pain and gait problem. Negative for arthralgias, joint swelling, myalgias, neck pain and neck stiffness.   Skin:  Negative for color change, pallor, rash and wound.   Allergic/Immunologic: Negative for environmental allergies, food allergies and immunocompromised state.   Neurological:  Positive for weakness and numbness. Negative for dizziness, tremors, seizures, syncope, facial asymmetry, speech difficulty, light-headedness and headaches.   Hematological:  Negative for adenopathy. Does not bruise/bleed easily.   Psychiatric/Behavioral:  Negative for agitation, behavioral problems, confusion, decreased concentration, dysphoric mood, hallucinations, self-injury, sleep disturbance and suicidal ideas. The patient is not nervous/anxious and is not hyperactive.       The patient's past medical history, past surgical history, family history, and social history have been reviewed at length in the electronic medical record.        Physical Exam:   Temp 97.7 °F (36.5 °C) (Infrared)   Ht 160 cm (62.99\")   Wt 84.5 kg (186 lb 4.8 oz)   BMI 33.01 kg/m²   Deferred     Medical Decision Making     Data Review:   (All imaging studies were personally reviewed unless stated otherwise)  Plain films dated 10/31/2024 demonstrate her construct to be intact from L3-5.  There is some mild angulation in the coronal plane at L2-3 where there is to space narrowing.   "   Once again CT myelogram study from August of this year demonstrates canal and root compromise at L2-3 above her prior construct.  She has a PEEK construct in place from L3-5.  She has solidly fused at L3-4 and L4-5.  I do not see overt translational instability at any level including L5-S1.     MRI of the lumbar spine dated 11/19/2024 confirms the above and demonstrates generous stenosis at L2-3.  There may be a trace offset of L5 on S1 but there is no root or canal compromise.     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     Treatment Options:   I have recommended additional decompression and fusion extension to the L2-3 level.  Hardware at L4-5 can likely be removed.  The nature of the procedure as well as the potential risks, complications, limitations, and alternatives to the procedure were discussed at length with the patient and the patient has agreed to proceed with surgery.  Formal cardiac clearance will be necessary.  If necessary hopefully she can be seen sooner by cardiology.        Scribed for Yoni Borja MD by Radha Nova MA on 11/20/2024 10:16 EST       I, Dr. Borja, personally performed the services described in the documentation, as scribed in my presence, and it is both accurate and complete.

## 2025-01-16 ENCOUNTER — ANESTHESIA EVENT (OUTPATIENT)
Dept: PERIOP | Facility: HOSPITAL | Age: 75
End: 2025-01-16
Payer: MEDICARE

## 2025-01-16 ENCOUNTER — DOCUMENTATION (OUTPATIENT)
Dept: NEUROSURGERY | Facility: CLINIC | Age: 75
End: 2025-01-16
Payer: MEDICARE

## 2025-01-16 RX ORDER — SODIUM CHLORIDE 0.9 % (FLUSH) 0.9 %
10 SYRINGE (ML) INJECTION AS NEEDED
Status: CANCELLED | OUTPATIENT
Start: 2025-01-16

## 2025-01-16 RX ORDER — FAMOTIDINE 10 MG/ML
20 INJECTION, SOLUTION INTRAVENOUS ONCE
Status: CANCELLED | OUTPATIENT
Start: 2025-01-16 | End: 2025-01-16

## 2025-01-16 NOTE — PROGRESS NOTES
Eneida Doessie 1950    PAT revealed abnormality in EKG.  I reviewed Dr. Funez's note from the past and I have discussed with anesthesia regarding any concerns for cardiac clearance.  Patient has not had abnormal chest complaints.  Okay to proceed.    Mirella Cuello PA-C

## 2025-01-17 ENCOUNTER — APPOINTMENT (OUTPATIENT)
Dept: GENERAL RADIOLOGY | Facility: HOSPITAL | Age: 75
DRG: 451 | End: 2025-01-17
Payer: MEDICARE

## 2025-01-17 ENCOUNTER — ANESTHESIA (OUTPATIENT)
Dept: PERIOP | Facility: HOSPITAL | Age: 75
End: 2025-01-17
Payer: MEDICARE

## 2025-01-17 ENCOUNTER — HOSPITAL ENCOUNTER (INPATIENT)
Facility: HOSPITAL | Age: 75
DRG: 451 | End: 2025-01-17
Attending: NEUROLOGICAL SURGERY | Admitting: NEUROLOGICAL SURGERY
Payer: MEDICARE

## 2025-01-17 DIAGNOSIS — M48.062 LUMBAR STENOSIS WITH NEUROGENIC CLAUDICATION: Primary | ICD-10-CM

## 2025-01-17 LAB — POTASSIUM SERPL-SCNC: 3.5 MMOL/L (ref 3.5–5.2)

## 2025-01-17 PROCEDURE — 25010000002 ONDANSETRON PER 1 MG

## 2025-01-17 PROCEDURE — 25810000003 LACTATED RINGERS PER 1000 ML: Performed by: STUDENT IN AN ORGANIZED HEALTH CARE EDUCATION/TRAINING PROGRAM

## 2025-01-17 PROCEDURE — 22850 REMOVE SPINE FIXATION DEVICE: CPT | Performed by: PHYSICIAN ASSISTANT

## 2025-01-17 PROCEDURE — A9270 NON-COVERED ITEM OR SERVICE: HCPCS | Performed by: NEUROLOGICAL SURGERY

## 2025-01-17 PROCEDURE — 84132 ASSAY OF SERUM POTASSIUM: CPT | Performed by: NEUROLOGICAL SURGERY

## 2025-01-17 PROCEDURE — 63052 LAM FACETC/FRMT ARTHRD LUM 1: CPT | Performed by: PHYSICIAN ASSISTANT

## 2025-01-17 PROCEDURE — C1713 ANCHOR/SCREW BN/BN,TIS/BN: HCPCS | Performed by: NEUROLOGICAL SURGERY

## 2025-01-17 PROCEDURE — 63710000001 SENNOSIDES-DOCUSATE 8.6-50 MG TABLET: Performed by: NEUROLOGICAL SURGERY

## 2025-01-17 PROCEDURE — 25010000002 VANCOMYCIN 1 G RECONSTITUTED SOLUTION: Performed by: NEUROLOGICAL SURGERY

## 2025-01-17 PROCEDURE — 25010000002 CEFAZOLIN PER 500 MG

## 2025-01-17 PROCEDURE — 22840 INSERT SPINE FIXATION DEVICE: CPT | Performed by: NEUROLOGICAL SURGERY

## 2025-01-17 PROCEDURE — 25010000002 HYDROMORPHONE 1 MG/ML SOLUTION

## 2025-01-17 PROCEDURE — 63710000001 MINERAL OIL OIL 10 ML VIAL: Performed by: NEUROLOGICAL SURGERY

## 2025-01-17 PROCEDURE — 8E0WXBZ COMPUTER ASSISTED PROCEDURE OF TRUNK REGION: ICD-10-PCS | Performed by: NEUROLOGICAL SURGERY

## 2025-01-17 PROCEDURE — 0QP004Z REMOVAL OF INTERNAL FIXATION DEVICE FROM LUMBAR VERTEBRA, OPEN APPROACH: ICD-10-PCS | Performed by: NEUROLOGICAL SURGERY

## 2025-01-17 PROCEDURE — 63710000001 LISINOPRIL 40 MG TABLET: Performed by: NEUROLOGICAL SURGERY

## 2025-01-17 PROCEDURE — 25010000002 MORPHINE PER 10 MG: Performed by: NEUROLOGICAL SURGERY

## 2025-01-17 PROCEDURE — C1889 IMPLANT/INSERT DEVICE, NOC: HCPCS | Performed by: NEUROLOGICAL SURGERY

## 2025-01-17 PROCEDURE — 22850 REMOVE SPINE FIXATION DEVICE: CPT | Performed by: NEUROLOGICAL SURGERY

## 2025-01-17 PROCEDURE — S0260 H&P FOR SURGERY: HCPCS | Performed by: PHYSICIAN ASSISTANT

## 2025-01-17 PROCEDURE — 63710000001 ROSUVASTATIN 20 MG TABLET: Performed by: NEUROLOGICAL SURGERY

## 2025-01-17 PROCEDURE — 25010000002 LIDOCAINE PF 1% 1 % SOLUTION

## 2025-01-17 PROCEDURE — 63710000001 HYDROCODONE-ACETAMINOPHEN 5-325 MG TABLET: Performed by: NEUROLOGICAL SURGERY

## 2025-01-17 PROCEDURE — 25810000003 LACTATED RINGERS PER 1000 ML: Performed by: NEUROLOGICAL SURGERY

## 2025-01-17 PROCEDURE — 25010000002 DEXAMETHASONE SODIUM PHOSPHATE 10 MG/ML SOLUTION

## 2025-01-17 PROCEDURE — 25810000003 SODIUM CHLORIDE PER 500 ML: Performed by: NEUROLOGICAL SURGERY

## 2025-01-17 PROCEDURE — 22853 INSJ BIOMECHANICAL DEVICE: CPT | Performed by: NEUROLOGICAL SURGERY

## 2025-01-17 PROCEDURE — 63710000001 OXYBUTYNIN XL 10 MG TABLET SUSTAINED-RELEASE 24 HOUR: Performed by: NEUROLOGICAL SURGERY

## 2025-01-17 PROCEDURE — 22630 ARTHRD PST TQ 1NTRSPC LUM: CPT | Performed by: NEUROLOGICAL SURGERY

## 2025-01-17 PROCEDURE — 22840 INSERT SPINE FIXATION DEVICE: CPT | Performed by: PHYSICIAN ASSISTANT

## 2025-01-17 PROCEDURE — 0SB20ZZ EXCISION OF LUMBAR VERTEBRAL DISC, OPEN APPROACH: ICD-10-PCS | Performed by: NEUROLOGICAL SURGERY

## 2025-01-17 PROCEDURE — 0SG00AJ FUSION OF LUMBAR VERTEBRAL JOINT WITH INTERBODY FUSION DEVICE, POSTERIOR APPROACH, ANTERIOR COLUMN, OPEN APPROACH: ICD-10-PCS | Performed by: NEUROLOGICAL SURGERY

## 2025-01-17 PROCEDURE — 22853 INSJ BIOMECHANICAL DEVICE: CPT | Performed by: PHYSICIAN ASSISTANT

## 2025-01-17 PROCEDURE — 25010000002 ESMOLOL 100 MG/10ML SOLUTION

## 2025-01-17 PROCEDURE — 63710000001 LATANOPROST 0.005 % SOLUTION 2.5 ML BOTTLE: Performed by: NEUROLOGICAL SURGERY

## 2025-01-17 PROCEDURE — 63710000001 OXYCODONE-ACETAMINOPHEN 7.5-325 MG TABLET: Performed by: NEUROLOGICAL SURGERY

## 2025-01-17 PROCEDURE — 25010000002 LIDOCAINE PF 1% 1 % SOLUTION: Performed by: STUDENT IN AN ORGANIZED HEALTH CARE EDUCATION/TRAINING PROGRAM

## 2025-01-17 PROCEDURE — 76000 FLUOROSCOPY <1 HR PHYS/QHP: CPT

## 2025-01-17 PROCEDURE — 25010000002 SUGAMMADEX 200 MG/2ML SOLUTION

## 2025-01-17 PROCEDURE — 25010000002 FENTANYL CITRATE (PF) 50 MCG/ML SOLUTION

## 2025-01-17 PROCEDURE — 63052 LAM FACETC/FRMT ARTHRD LUM 1: CPT | Performed by: NEUROLOGICAL SURGERY

## 2025-01-17 PROCEDURE — 22630 ARTHRD PST TQ 1NTRSPC LUM: CPT | Performed by: PHYSICIAN ASSISTANT

## 2025-01-17 PROCEDURE — 25010000002 FENTANYL CITRATE (PF) 100 MCG/2ML SOLUTION

## 2025-01-17 PROCEDURE — 25010000002 CEFAZOLIN PER 500 MG: Performed by: NEUROLOGICAL SURGERY

## 2025-01-17 PROCEDURE — 00UT0KZ SUPPLEMENT SPINAL MENINGES WITH NONAUTOLOGOUS TISSUE SUBSTITUTE, OPEN APPROACH: ICD-10-PCS | Performed by: NEUROLOGICAL SURGERY

## 2025-01-17 PROCEDURE — 61783 SCAN PROC SPINAL: CPT | Performed by: NEUROLOGICAL SURGERY

## 2025-01-17 PROCEDURE — 25010000002 PROPOFOL 10 MG/ML EMULSION

## 2025-01-17 DEVICE — SSC BONE WAX
Type: IMPLANTABLE DEVICE | Site: SPINE LUMBAR | Status: FUNCTIONAL
Brand: SSC BONE WAX

## 2025-01-17 DEVICE — ADHERUS AUTOSPRAY DURAL SEALANT IS A STERILE, SINGLE-USE, ELECTROMECHANICAL, BATTERY OPERATED, DEVICE WITH INTERNAL SYSTEM COMPONENTS THAT PROVIDE AIR FLOW TO AID IN THE DELIVERY OF A SYNTHETIC, ABSORBABLE, TWO-COMPONENT HYDROGEL SEALANT SYSTEM AND ALLOW DELIVERY TO BE INTERRUPTED WITHOUT CLOGGING.
Type: IMPLANTABLE DEVICE | Site: SPINE LUMBAR | Status: FUNCTIONAL
Brand: ADHERUS AUTOSPRAY DURAL SEALANT

## 2025-01-17 DEVICE — FLOSEAL WITH RECOTHROM - 10ML.
Type: IMPLANTABLE DEVICE | Site: SPINE LUMBAR | Status: FUNCTIONAL
Brand: FLOSEAL HEMOSTATIC MATRIX

## 2025-01-17 DEVICE — AVITENE ULTRAFOAM, 8 CM X 12.5 CM (3-1/8" X 5"), 100 SQ CM
Type: IMPLANTABLE DEVICE | Site: SPINE LUMBAR | Status: FUNCTIONAL
Brand: AVITENE

## 2025-01-17 DEVICE — DURAGEN® PLUS DURAL REGENERATION MATRIX, 1 IN X 3 IN (2.5 CM X 7.5 CM)
Type: IMPLANTABLE DEVICE | Site: SPINE LUMBAR | Status: FUNCTIONAL
Brand: DURAGEN® PLUS

## 2025-01-17 DEVICE — SPACER 84332408 ADAPTIX 24MM X 8MM
Type: IMPLANTABLE DEVICE | Site: SPINE LUMBAR | Status: FUNCTIONAL
Brand: ADAPTIX™ INTERBODY SYSTEM WITH TITAN NANOLOCK™ SURFACE TECHNOLOGY

## 2025-01-17 RX ORDER — BISACODYL 10 MG
10 SUPPOSITORY, RECTAL RECTAL DAILY PRN
Status: DISCONTINUED | OUTPATIENT
Start: 2025-01-17 | End: 2025-01-22 | Stop reason: HOSPADM

## 2025-01-17 RX ORDER — HYDROCHLOROTHIAZIDE 25 MG/1
25 TABLET ORAL DAILY
Status: DISCONTINUED | OUTPATIENT
Start: 2025-01-18 | End: 2025-01-22 | Stop reason: HOSPADM

## 2025-01-17 RX ORDER — FENTANYL CITRATE 50 UG/ML
50 INJECTION, SOLUTION INTRAMUSCULAR; INTRAVENOUS
Status: DISCONTINUED | OUTPATIENT
Start: 2025-01-17 | End: 2025-01-17 | Stop reason: HOSPADM

## 2025-01-17 RX ORDER — ONDANSETRON 2 MG/ML
4 INJECTION INTRAMUSCULAR; INTRAVENOUS EVERY 6 HOURS PRN
Status: DISCONTINUED | OUTPATIENT
Start: 2025-01-17 | End: 2025-01-22 | Stop reason: HOSPADM

## 2025-01-17 RX ORDER — DIPHENHYDRAMINE HCL 25 MG
25 CAPSULE ORAL NIGHTLY PRN
Status: DISCONTINUED | OUTPATIENT
Start: 2025-01-17 | End: 2025-01-22 | Stop reason: HOSPADM

## 2025-01-17 RX ORDER — BISACODYL 5 MG/1
5 TABLET, DELAYED RELEASE ORAL DAILY PRN
Status: DISCONTINUED | OUTPATIENT
Start: 2025-01-17 | End: 2025-01-22 | Stop reason: HOSPADM

## 2025-01-17 RX ORDER — OXYCODONE AND ACETAMINOPHEN 7.5; 325 MG/1; MG/1
1 TABLET ORAL EVERY 4 HOURS PRN
Status: DISPENSED | OUTPATIENT
Start: 2025-01-17 | End: 2025-01-22

## 2025-01-17 RX ORDER — POLYETHYLENE GLYCOL 3350 17 G/17G
17 POWDER, FOR SOLUTION ORAL DAILY PRN
Status: DISCONTINUED | OUTPATIENT
Start: 2025-01-17 | End: 2025-01-22 | Stop reason: HOSPADM

## 2025-01-17 RX ORDER — BUPIVACAINE HYDROCHLORIDE AND EPINEPHRINE 2.5; 5 MG/ML; UG/ML
INJECTION, SOLUTION EPIDURAL; INFILTRATION; INTRACAUDAL; PERINEURAL AS NEEDED
Status: DISCONTINUED | OUTPATIENT
Start: 2025-01-17 | End: 2025-01-17 | Stop reason: HOSPADM

## 2025-01-17 RX ORDER — PANTOPRAZOLE SODIUM 40 MG/1
40 TABLET, DELAYED RELEASE ORAL
Status: DISCONTINUED | OUTPATIENT
Start: 2025-01-18 | End: 2025-01-22 | Stop reason: HOSPADM

## 2025-01-17 RX ORDER — SODIUM CHLORIDE, SODIUM LACTATE, POTASSIUM CHLORIDE, CALCIUM CHLORIDE 600; 310; 30; 20 MG/100ML; MG/100ML; MG/100ML; MG/100ML
9 INJECTION, SOLUTION INTRAVENOUS CONTINUOUS
Status: ACTIVE | OUTPATIENT
Start: 2025-01-18 | End: 2025-01-18

## 2025-01-17 RX ORDER — AMOXICILLIN 250 MG
2 CAPSULE ORAL 2 TIMES DAILY
Status: DISCONTINUED | OUTPATIENT
Start: 2025-01-17 | End: 2025-01-22 | Stop reason: HOSPADM

## 2025-01-17 RX ORDER — ROSUVASTATIN CALCIUM 20 MG/1
40 TABLET, COATED ORAL NIGHTLY
Status: DISCONTINUED | OUTPATIENT
Start: 2025-01-17 | End: 2025-01-22 | Stop reason: HOSPADM

## 2025-01-17 RX ORDER — PROMETHAZINE HYDROCHLORIDE 12.5 MG/1
12.5 TABLET ORAL EVERY 6 HOURS PRN
Status: DISCONTINUED | OUTPATIENT
Start: 2025-01-17 | End: 2025-01-22 | Stop reason: HOSPADM

## 2025-01-17 RX ORDER — DEXAMETHASONE SODIUM PHOSPHATE 10 MG/ML
INJECTION, SOLUTION INTRAMUSCULAR; INTRAVENOUS AS NEEDED
Status: DISCONTINUED | OUTPATIENT
Start: 2025-01-17 | End: 2025-01-17 | Stop reason: SURG

## 2025-01-17 RX ORDER — FENTANYL CITRATE 50 UG/ML
INJECTION, SOLUTION INTRAMUSCULAR; INTRAVENOUS
Status: COMPLETED
Start: 2025-01-17 | End: 2025-01-17

## 2025-01-17 RX ORDER — ACETAMINOPHEN 160 MG/5ML
650 SOLUTION ORAL EVERY 4 HOURS PRN
Status: DISCONTINUED | OUTPATIENT
Start: 2025-01-17 | End: 2025-01-22 | Stop reason: HOSPADM

## 2025-01-17 RX ORDER — LIDOCAINE HYDROCHLORIDE 10 MG/ML
0.5 INJECTION, SOLUTION EPIDURAL; INFILTRATION; INTRACAUDAL; PERINEURAL ONCE AS NEEDED
Status: COMPLETED | OUTPATIENT
Start: 2025-01-17 | End: 2025-01-17

## 2025-01-17 RX ORDER — BUPIVACAINE HCL/0.9 % NACL/PF 0.125 %
PLASTIC BAG, INJECTION (ML) EPIDURAL AS NEEDED
Status: DISCONTINUED | OUTPATIENT
Start: 2025-01-17 | End: 2025-01-17 | Stop reason: SURG

## 2025-01-17 RX ORDER — PROPOFOL 10 MG/ML
VIAL (ML) INTRAVENOUS AS NEEDED
Status: DISCONTINUED | OUTPATIENT
Start: 2025-01-17 | End: 2025-01-17 | Stop reason: SURG

## 2025-01-17 RX ORDER — LATANOPROST 50 UG/ML
1 SOLUTION/ DROPS OPHTHALMIC NIGHTLY
Status: DISCONTINUED | OUTPATIENT
Start: 2025-01-17 | End: 2025-01-22 | Stop reason: HOSPADM

## 2025-01-17 RX ORDER — SODIUM CHLORIDE 0.9 % (FLUSH) 0.9 %
10 SYRINGE (ML) INJECTION EVERY 12 HOURS SCHEDULED
Status: DISCONTINUED | OUTPATIENT
Start: 2025-01-17 | End: 2025-01-22 | Stop reason: HOSPADM

## 2025-01-17 RX ORDER — PROMETHAZINE HYDROCHLORIDE 12.5 MG/1
12.5 SUPPOSITORY RECTAL EVERY 6 HOURS PRN
Status: DISCONTINUED | OUTPATIENT
Start: 2025-01-17 | End: 2025-01-22 | Stop reason: HOSPADM

## 2025-01-17 RX ORDER — ONDANSETRON 4 MG/1
4 TABLET, ORALLY DISINTEGRATING ORAL EVERY 6 HOURS PRN
Status: DISCONTINUED | OUTPATIENT
Start: 2025-01-17 | End: 2025-01-22 | Stop reason: HOSPADM

## 2025-01-17 RX ORDER — SODIUM CHLORIDE 9 MG/ML
INJECTION, SOLUTION INTRAVENOUS AS NEEDED
Status: DISCONTINUED | OUTPATIENT
Start: 2025-01-17 | End: 2025-01-17 | Stop reason: HOSPADM

## 2025-01-17 RX ORDER — HYDROCODONE BITARTRATE AND ACETAMINOPHEN 5; 325 MG/1; MG/1
1 TABLET ORAL EVERY 4 HOURS PRN
Status: DISPENSED | OUTPATIENT
Start: 2025-01-17 | End: 2025-01-22

## 2025-01-17 RX ORDER — HYDROMORPHONE HYDROCHLORIDE 1 MG/ML
0.5 INJECTION, SOLUTION INTRAMUSCULAR; INTRAVENOUS; SUBCUTANEOUS
Status: DISCONTINUED | OUTPATIENT
Start: 2025-01-17 | End: 2025-01-17 | Stop reason: HOSPADM

## 2025-01-17 RX ORDER — DEXMEDETOMIDINE HYDROCHLORIDE 100 UG/ML
INJECTION, SOLUTION INTRAVENOUS AS NEEDED
Status: DISCONTINUED | OUTPATIENT
Start: 2025-01-17 | End: 2025-01-17 | Stop reason: SURG

## 2025-01-17 RX ORDER — LISINOPRIL 40 MG/1
40 TABLET ORAL
Status: DISCONTINUED | OUTPATIENT
Start: 2025-01-17 | End: 2025-01-22 | Stop reason: HOSPADM

## 2025-01-17 RX ORDER — ONDANSETRON 2 MG/ML
INJECTION INTRAMUSCULAR; INTRAVENOUS AS NEEDED
Status: DISCONTINUED | OUTPATIENT
Start: 2025-01-17 | End: 2025-01-17 | Stop reason: SURG

## 2025-01-17 RX ORDER — OXYBUTYNIN CHLORIDE 10 MG/1
10 TABLET, EXTENDED RELEASE ORAL DAILY
Status: DISCONTINUED | OUTPATIENT
Start: 2025-01-17 | End: 2025-01-22 | Stop reason: HOSPADM

## 2025-01-17 RX ORDER — ASPIRIN 81 MG/1
81 TABLET ORAL DAILY
Status: DISCONTINUED | OUTPATIENT
Start: 2025-01-18 | End: 2025-01-22 | Stop reason: HOSPADM

## 2025-01-17 RX ORDER — MORPHINE SULFATE 2 MG/ML
2 INJECTION, SOLUTION INTRAMUSCULAR; INTRAVENOUS
Status: DISPENSED | OUTPATIENT
Start: 2025-01-17 | End: 2025-01-22

## 2025-01-17 RX ORDER — METOPROLOL SUCCINATE 100 MG/1
100 TABLET, EXTENDED RELEASE ORAL DAILY
Status: DISCONTINUED | OUTPATIENT
Start: 2025-01-18 | End: 2025-01-22 | Stop reason: HOSPADM

## 2025-01-17 RX ORDER — SODIUM CHLORIDE, SODIUM LACTATE, POTASSIUM CHLORIDE, CALCIUM CHLORIDE 600; 310; 30; 20 MG/100ML; MG/100ML; MG/100ML; MG/100ML
90 INJECTION, SOLUTION INTRAVENOUS CONTINUOUS
Status: DISCONTINUED | OUTPATIENT
Start: 2025-01-17 | End: 2025-01-18

## 2025-01-17 RX ORDER — AMLODIPINE BESYLATE 10 MG/1
10 TABLET ORAL DAILY
Status: DISCONTINUED | OUTPATIENT
Start: 2025-01-18 | End: 2025-01-22 | Stop reason: HOSPADM

## 2025-01-17 RX ORDER — ROCURONIUM BROMIDE 10 MG/ML
INJECTION, SOLUTION INTRAVENOUS AS NEEDED
Status: DISCONTINUED | OUTPATIENT
Start: 2025-01-17 | End: 2025-01-17 | Stop reason: SURG

## 2025-01-17 RX ORDER — SODIUM CHLORIDE 9 MG/ML
40 INJECTION, SOLUTION INTRAVENOUS AS NEEDED
Status: DISCONTINUED | OUTPATIENT
Start: 2025-01-17 | End: 2025-01-22 | Stop reason: HOSPADM

## 2025-01-17 RX ORDER — ESMOLOL HYDROCHLORIDE 10 MG/ML
INJECTION INTRAVENOUS AS NEEDED
Status: DISCONTINUED | OUTPATIENT
Start: 2025-01-17 | End: 2025-01-17 | Stop reason: SURG

## 2025-01-17 RX ORDER — SODIUM CHLORIDE 0.9 % (FLUSH) 0.9 %
10 SYRINGE (ML) INJECTION EVERY 12 HOURS SCHEDULED
Status: DISCONTINUED | OUTPATIENT
Start: 2025-01-17 | End: 2025-01-17 | Stop reason: HOSPADM

## 2025-01-17 RX ORDER — LIDOCAINE HYDROCHLORIDE 10 MG/ML
INJECTION, SOLUTION EPIDURAL; INFILTRATION; INTRACAUDAL; PERINEURAL AS NEEDED
Status: DISCONTINUED | OUTPATIENT
Start: 2025-01-17 | End: 2025-01-17 | Stop reason: SURG

## 2025-01-17 RX ORDER — SODIUM CHLORIDE 0.9 % (FLUSH) 0.9 %
10 SYRINGE (ML) INJECTION AS NEEDED
Status: DISCONTINUED | OUTPATIENT
Start: 2025-01-17 | End: 2025-01-22 | Stop reason: HOSPADM

## 2025-01-17 RX ORDER — MIDAZOLAM HYDROCHLORIDE 1 MG/ML
0.5 INJECTION, SOLUTION INTRAMUSCULAR; INTRAVENOUS
Status: DISCONTINUED | OUTPATIENT
Start: 2025-01-17 | End: 2025-01-17 | Stop reason: HOSPADM

## 2025-01-17 RX ORDER — VANCOMYCIN HYDROCHLORIDE 1 G/20ML
INJECTION, POWDER, LYOPHILIZED, FOR SOLUTION INTRAVENOUS AS NEEDED
Status: DISCONTINUED | OUTPATIENT
Start: 2025-01-17 | End: 2025-01-17 | Stop reason: HOSPADM

## 2025-01-17 RX ORDER — NALOXONE HCL 0.4 MG/ML
0.4 VIAL (ML) INJECTION
Status: DISCONTINUED | OUTPATIENT
Start: 2025-01-17 | End: 2025-01-22 | Stop reason: HOSPADM

## 2025-01-17 RX ORDER — EPHEDRINE SULFATE 50 MG/ML
INJECTION INTRAVENOUS AS NEEDED
Status: DISCONTINUED | OUTPATIENT
Start: 2025-01-17 | End: 2025-01-17 | Stop reason: SURG

## 2025-01-17 RX ORDER — ONDANSETRON 2 MG/ML
4 INJECTION INTRAMUSCULAR; INTRAVENOUS ONCE AS NEEDED
Status: DISCONTINUED | OUTPATIENT
Start: 2025-01-17 | End: 2025-01-17 | Stop reason: HOSPADM

## 2025-01-17 RX ORDER — FAMOTIDINE 20 MG/1
20 TABLET, FILM COATED ORAL ONCE
Status: COMPLETED | OUTPATIENT
Start: 2025-01-17 | End: 2025-01-17

## 2025-01-17 RX ORDER — MINERAL OIL
OIL (ML) MISCELLANEOUS AS NEEDED
Status: DISCONTINUED | OUTPATIENT
Start: 2025-01-17 | End: 2025-01-17 | Stop reason: HOSPADM

## 2025-01-17 RX ORDER — FENTANYL CITRATE 50 UG/ML
INJECTION, SOLUTION INTRAMUSCULAR; INTRAVENOUS AS NEEDED
Status: DISCONTINUED | OUTPATIENT
Start: 2025-01-17 | End: 2025-01-17 | Stop reason: SURG

## 2025-01-17 RX ORDER — MAGNESIUM HYDROXIDE 1200 MG/15ML
LIQUID ORAL AS NEEDED
Status: DISCONTINUED | OUTPATIENT
Start: 2025-01-17 | End: 2025-01-17 | Stop reason: HOSPADM

## 2025-01-17 RX ORDER — ACETAMINOPHEN 325 MG/1
650 TABLET ORAL EVERY 4 HOURS PRN
Status: DISCONTINUED | OUTPATIENT
Start: 2025-01-17 | End: 2025-01-22 | Stop reason: HOSPADM

## 2025-01-17 RX ADMIN — HYDROCODONE BITARTRATE AND ACETAMINOPHEN 1 TABLET: 5; 325 TABLET ORAL at 22:11

## 2025-01-17 RX ADMIN — FENTANYL CITRATE 50 MCG: 50 INJECTION, SOLUTION INTRAMUSCULAR; INTRAVENOUS at 12:41

## 2025-01-17 RX ADMIN — EPHEDRINE SULFATE 5 MG: 50 INJECTION INTRAVENOUS at 07:33

## 2025-01-17 RX ADMIN — SODIUM CHLORIDE, POTASSIUM CHLORIDE, SODIUM LACTATE AND CALCIUM CHLORIDE 9 ML/HR: 600; 310; 30; 20 INJECTION, SOLUTION INTRAVENOUS at 06:15

## 2025-01-17 RX ADMIN — SODIUM CHLORIDE 2 G: 900 INJECTION INTRAVENOUS at 07:01

## 2025-01-17 RX ADMIN — LATANOPROST 1 DROP: 50 SOLUTION OPHTHALMIC at 21:44

## 2025-01-17 RX ADMIN — OXYBUTYNIN CHLORIDE 10 MG: 10 TABLET, EXTENDED RELEASE ORAL at 16:45

## 2025-01-17 RX ADMIN — Medication 100 MCG: at 07:34

## 2025-01-17 RX ADMIN — FENTANYL CITRATE 50 MCG: 50 INJECTION, SOLUTION INTRAMUSCULAR; INTRAVENOUS at 14:52

## 2025-01-17 RX ADMIN — ROCURONIUM BROMIDE 10 MG: 10 INJECTION INTRAVENOUS at 07:34

## 2025-01-17 RX ADMIN — OXYCODONE HYDROCHLORIDE AND ACETAMINOPHEN 1 TABLET: 7.5; 325 TABLET ORAL at 19:33

## 2025-01-17 RX ADMIN — FENTANYL CITRATE 100 MCG: 50 INJECTION, SOLUTION INTRAMUSCULAR; INTRAVENOUS at 06:56

## 2025-01-17 RX ADMIN — DEXMEDETOMIDINE HYDROCHLORIDE 8 MCG: 100 INJECTION, SOLUTION INTRAVENOUS at 07:24

## 2025-01-17 RX ADMIN — LISINOPRIL 40 MG: 40 TABLET ORAL at 16:46

## 2025-01-17 RX ADMIN — LIDOCAINE HYDROCHLORIDE 50 MG: 10 INJECTION, SOLUTION EPIDURAL; INFILTRATION; INTRACAUDAL; PERINEURAL at 06:56

## 2025-01-17 RX ADMIN — SODIUM CHLORIDE 2000 MG: 900 INJECTION INTRAVENOUS at 21:44

## 2025-01-17 RX ADMIN — ROCURONIUM BROMIDE 50 MG: 10 INJECTION INTRAVENOUS at 06:56

## 2025-01-17 RX ADMIN — ONDANSETRON 4 MG: 2 INJECTION INTRAMUSCULAR; INTRAVENOUS at 09:40

## 2025-01-17 RX ADMIN — Medication 50 MCG: at 07:59

## 2025-01-17 RX ADMIN — HYDROMORPHONE HYDROCHLORIDE 0.5 MG: 1 INJECTION, SOLUTION INTRAMUSCULAR; INTRAVENOUS; SUBCUTANEOUS at 10:41

## 2025-01-17 RX ADMIN — HYDROCODONE BITARTRATE AND ACETAMINOPHEN 1 TABLET: 5; 325 TABLET ORAL at 18:14

## 2025-01-17 RX ADMIN — ROCURONIUM BROMIDE 10 MG: 10 INJECTION INTRAVENOUS at 08:13

## 2025-01-17 RX ADMIN — FENTANYL CITRATE 100 MCG: 50 INJECTION, SOLUTION INTRAMUSCULAR; INTRAVENOUS at 09:48

## 2025-01-17 RX ADMIN — SUGAMMADEX 200 MG: 100 INJECTION, SOLUTION INTRAVENOUS at 09:45

## 2025-01-17 RX ADMIN — Medication 10 ML: at 19:34

## 2025-01-17 RX ADMIN — LIDOCAINE HYDROCHLORIDE 0.5 ML: 10 INJECTION, SOLUTION EPIDURAL; INFILTRATION; INTRACAUDAL; PERINEURAL at 06:14

## 2025-01-17 RX ADMIN — DEXMEDETOMIDINE HYDROCHLORIDE 4 MCG: 100 INJECTION, SOLUTION INTRAVENOUS at 08:43

## 2025-01-17 RX ADMIN — DEXAMETHASONE SODIUM PHOSPHATE 10 MG: 10 INJECTION INTRAMUSCULAR; INTRAVENOUS at 06:56

## 2025-01-17 RX ADMIN — ROSUVASTATIN 40 MG: 20 TABLET, FILM COATED ORAL at 19:33

## 2025-01-17 RX ADMIN — Medication 100 MCG: at 07:49

## 2025-01-17 RX ADMIN — ESMOLOL HYDROCHLORIDE 10 MG: 10 INJECTION, SOLUTION INTRAVENOUS at 09:38

## 2025-01-17 RX ADMIN — DEXMEDETOMIDINE HYDROCHLORIDE 4 MCG: 100 INJECTION, SOLUTION INTRAVENOUS at 08:19

## 2025-01-17 RX ADMIN — Medication 100 MCG: at 08:27

## 2025-01-17 RX ADMIN — FAMOTIDINE 20 MG: 20 TABLET, FILM COATED ORAL at 06:15

## 2025-01-17 RX ADMIN — ROCURONIUM BROMIDE 10 MG: 10 INJECTION INTRAVENOUS at 08:59

## 2025-01-17 RX ADMIN — PROPOFOL 200 MG: 10 INJECTION, EMULSION INTRAVENOUS at 06:56

## 2025-01-17 RX ADMIN — SENNOSIDES AND DOCUSATE SODIUM 2 TABLET: 50; 8.6 TABLET ORAL at 19:33

## 2025-01-17 RX ADMIN — SODIUM CHLORIDE 2000 MG: 900 INJECTION INTRAVENOUS at 15:59

## 2025-01-17 RX ADMIN — MORPHINE SULFATE 2 MG: 2 INJECTION, SOLUTION INTRAMUSCULAR; INTRAVENOUS at 16:46

## 2025-01-17 RX ADMIN — SODIUM CHLORIDE, SODIUM LACTATE, POTASSIUM CHLORIDE, CALCIUM CHLORIDE 90 ML/HR: 20; 30; 600; 310 INJECTION, SOLUTION INTRAVENOUS at 16:53

## 2025-01-17 NOTE — ANESTHESIA PREPROCEDURE EVALUATION
Anesthesia Evaluation     Patient summary reviewed and Nursing notes reviewed   no history of anesthetic complications:   NPO Solid Status: > 8 hours  NPO Liquid Status: > 8 hours           Airway   Mallampati: II  TM distance: >3 FB  Neck ROM: full  No difficulty expected  Dental      Pulmonary - negative pulmonary ROS and normal exam   Cardiovascular - normal exam    (+) hypertension, CAD, hyperlipidemia      Neuro/Psych- negative ROS  GI/Hepatic/Renal/Endo    (+) GERD, PUD    Musculoskeletal     Abdominal    Substance History      OB/GYN          Other                    Anesthesia Plan    ASA 3     general     intravenous induction     Anesthetic plan, risks, benefits, and alternatives have been provided, discussed and informed consent has been obtained with: patient.    Plan discussed with CRNA.    CODE STATUS:

## 2025-01-17 NOTE — OP NOTE
NEUROSURGICAL OPERATIVE NOTE        PREOPERATIVE DIAGNOSIS:    L2-3 stenosis and instability  Lumbar transition syndrome      POSTOPERATIVE DIAGNOSIS:  Same      PROCEDURE:  1.  Arthrodesis interbody type L2-3  2.  Bilateral L2-3 discectomy with right L2-3 Adaptix cage placement  3.  Nonsegmental pedicle screw fixation L2 to the prior construct utilizing PEEK Legacy  4.  Use of [place] and local autograft  5.  Removal of old pedicle screw hardware at L5  6.  Stealth frameless stereotaxy utilized in conjunction with O arm imaging      SURGEON:  Yoni Borja M.D.      ASSISTANT: Wendy Cuello PA-C    PAC assisted with:   Suctioning   Retraction   Tying   Suturing   Closing   Application of dressing   Skilled neurosurgery PA assistance was necessary to perform this procedure.        ANESTHESIA:  General      ESTIMATED BLOOD LOSS: 150 mL      SPECIMEN: None      DRAINS: Hemovac      COMPLICATIONS:  None      Spinal Surgery Levels Completed:1 Level        CLINICAL NOTE:  The patient is a 74-year-old woman with progressive back and bilateral lower extremity pain.  A number of years ago she underwent L4-5 fusion and did well.  In 2022 her fusion construct was extended up to the L3-4 level.  She has generally done well but has had progressive back and lower extremity pain with walking and standing intolerance.  Studies demonstrate stenosis and evidence of instability at the L2-3 level above her prior construct.  As such, she presents at this time for additional decompression with fusion and stabilization extending to the L2-3 level.  The nature of the procedure as well as the potential risks, complications, limitations, and alternatives to the procedure were discussed at length with the patient and the patient has agreed to proceed with surgery.      TECHNICAL NOTE:  The patient was brought to the operating room and while in her cart general endotracheal anesthesia was achieved. She was then turned prone on to the  Ryan table. Special care was ensured to protect pressure points. Her low back was prepared and draped in the usual fashion. The previous upper half to ¾ of her prior incision was reopened and underlying tissues were divided with electrocautery. Previous construct was exposed up to the L2-L3 level as well. The set screws were backed out at L3, L4 and L5. The old rods were removed. Pedicle screws were removed at L5 bilaterally. These defects were filled with Gelfoam. Reference frame was affixed to a spinous process. O-arm imaging pursued, and these images were downloaded into the [employer] using Stealth frameless stereotaxy. Each of the pedicle screw hole sites at L2 were marked, drilled, and tapped. 5.5 mm diameter by 45 mm length screws were placed at L2 bilaterally. Leksell rongeur was utilized to remove the spinous processes at L2 and residual of L3. Drill was utilized to fashion the central trough, which was widened. At her prior intervention, she had a laminotomy and foraminotomy on the left at L2-3 and large amounts of scar tissue were encountered at this level. Partial facetectomies were performed bilaterally. There was some disc extrusion extending onto the undersurface of the disc on the left side. There was some scar tissue as well. I am attempting to remove some of this disc material. A small ventral rent occurred in the dura. I was able to get a couple of 6-0 Prolene sutures in this, which seemed to address the leak adequately. However, given all the scar tissue and limited ability to retract on the dural sac further than the case, I elected to hold off on placing a cage from the left side. The C-arm was brought in to use. On the right side at L2-3 the disc was incised and evacuated piecemeal with an array of pituitary rongeurs, curettes and punches. Serial impactors were impacted into the disc space. Ultimately an 8 x 24 mm Adaptix cage was impacted into the disc space using fluoroscopic  guidance. Prior to inserting the cage, some of the fusion substrate consisting of [place] local autograft was placed anteriorly and over to the left  within the disc space and that same fusion substrate was utilized to pack the cage that was impacted into place. Epidural bleeding was controlled with bipolar cautery and Floseal. Good decompression of the thecal sac and nerve roots were accomplished with the Valsalva maneuver. There is no evidence of CSF leak. However, I used nonsuturable DuraGen to cover the small area of dural repair on the left side. I then used Adherus sealant as well. A Hemovac drain was brought in through a separate stab incision and left in the supralaminar space. Vancomycin powder was sprinkled in the depths and then more superficially as the wound was closed. The paraspinous muscle and fascia were reapproximated in an interrupted fashion with 0 Vicryl suture. Then 0.25% Marcaine was instilled in the paraspinous musculature and subcutaneous tissue. The subcutaneous tissue was closed in layers with 2-0 followed by 3-0 Vicryl suture. The skin was closed in a running locked fashion with a 3-0 Nylon suture. Sterile dressing was applied. She was rolled onto her cart, extubated and taken to the recovery room in satisfactory condition.             Yoni Borja M.D.

## 2025-01-17 NOTE — ANESTHESIA POSTPROCEDURE EVALUATION
Patient: Eneida Bartholomew    Procedure Summary       Date: 01/17/25 Room / [facility]:  [facility]    Anesthesia Start: 0653 Anesthesia Stop: 1005    Procedure: L2-3 TRANSFORAMINAL LUMBAR INTERBODY FUSION WIHT PEDICLE SCREWS, REMOVAL OF L5 HARDWARE (Spine Lumbar) Diagnosis:       Lumbar spinal stenosis due to adjacent segment disease after fusion procedure      (Lumbar spinal stenosis due to adjacent segment disease after fusion procedure [M48.061, M51.369, Z98.1])    Surgeons: Yoni Borja MD Provider: Sage Whitaker MD    Anesthesia Type: general ASA Status: 3            Anesthesia Type: general    Vitals  Vitals Value Taken Time   /62 01/17/25 1005   Temp 97 °F (36.1 °C) 01/17/25 1005   Pulse 99 01/17/25 1005   Resp 20 01/17/25 1005   SpO2 100 % 01/17/25 1005           Post Anesthesia Care and Evaluation    Patient location during evaluation: PACU  Patient participation: complete - patient participated  Level of consciousness: sleepy but conscious  Pain score: 0  Pain management: adequate    Airway patency: patent  Anesthetic complications: No anesthetic complications  PONV Status: none  Cardiovascular status: hemodynamically stable and acceptable  Respiratory status: nonlabored ventilation, acceptable, nasal cannula and oral airway  Hydration status: acceptable    Comments: Pt arrived to PACU with no issues during transport. Pt placed directly to monitors. Vital signs are within parameters. Pt maintaining ventilation spontaneously. No changes to dental. Report given to PACU and all question and concerns were addressed as well as the plan of care.

## 2025-01-17 NOTE — PLAN OF CARE
Goal Outcome Evaluation:   Patient VSS and on room air. Strict bedrest and to remain flat per PACU nurse and daughter until seen by doctor. Pain controlled by PRN pain medication.LR 90 ml/hr. Daughter remains at beside. No complaints at this time.

## 2025-01-17 NOTE — ANESTHESIA PROCEDURE NOTES
Airway  Urgency: elective    Date/Time: 1/17/2025 6:59 AM  Airway not difficult    General Information and Staff    Patient location during procedure: OR  CRNA/CAA: Koffi Wayne CRNA    Indications and Patient Condition  Indications for airway management: airway protection    Preoxygenated: yes  MILS not maintained throughout  Mask difficulty assessment: 2 - vent by mask + OA or adjuvant +/- NMBA    Final Airway Details  Final airway type: endotracheal airway      Successful airway: ETT  Cuffed: yes   Successful intubation technique: direct laryngoscopy  Endotracheal tube insertion site: oral  Blade: Adeline  Blade size: 3  ETT size (mm): 7.0  Cormack-Lehane Classification: grade I - full view of glottis  Placement verified by: chest auscultation and capnometry   Cuff volume (mL): 8  Measured from: lips  ETT/EBT  to lips (cm): 21  Number of attempts at approach: 1  Assessment: lips, teeth, and gum same as pre-op and atraumatic intubation    Additional Comments  Negative epigastric sounds, Breath sound equal bilaterally with symmetric chest rise and fall

## 2025-01-17 NOTE — H&P
"Pre-Op H&P  Eneida Bartholomew  [id number]  1950    Chief complaint: \"Both of my legs are killing me\"    HPI:    Patient is a 74 y.o.female who presents with past history of lumbar decompression and fusion he has done fairly well.  Patient has continued recently over the last 9 months to a year to have extended back pain down bilateral lower extremities.  She has failed conservative therapy.  With her bilateral lower extremity neurogenic claudication the patient is now admitted for decompression and fusion.    Review of Systems:  General ROS: negative for chills, fever or skin lesions;  No changes since last office visit.  Neg for recent sick exposure  Cardiovascular ROS: no chest pain or dyspnea on exertion  Respiratory ROS: no cough, shortness of breath, or wheezing    Allergies:   Allergies   Allergen Reactions    Other Shortness Of Breath     Lavendar scent caused SOA and swelling of throat    Prednisone Shortness Of Breath     And nervous    Shellfish-Derived Products Anaphylaxis, Rash, Shortness Of Breath and Swelling     ALL SEAFOOD, FISH    Iodinated Contrast Media Other (See Comments)     Other reaction(s): Feels unwell, Hypotension    Erythromycin Rash    Gabapentin Unknown - Low Severity     STRANGE FEELING    Omeprazole Rash    Sulfa Antibiotics Rash    Tramadol Unknown - Low Severity       Home Meds:    Current Facility-Administered Medications on File Prior to Encounter   Medication Dose Route Frequency Provider Last Rate Last Admin    mupirocin (BACTROBAN) 2 % nasal ointment   Nasal BID Yoni Borja MD         Current Outpatient Medications on File Prior to Encounter   Medication Sig Dispense Refill    amLODIPine (NORVASC) 10 MG tablet Take 1 tablet by mouth Daily.      aspirin 81 MG EC tablet Take 1 tablet by mouth Daily.      baclofen (LIORESAL) 10 MG tablet 1 tablet 3 (Three) Times a Day As Needed.      benazepril (LOTENSIN) 40 MG tablet Take 1 tablet by mouth Daily.      Chlorhexidine " Gluconate 4 % solution Shower each day with solution for 5 days beginning 5 days before surgery. 120 mL 0    Flaxseed, Linseed, (FLAX SEED OIL PO) Take  by mouth.      fluticasone (FLONASE) 50 MCG/ACT nasal spray 1 spray.      hydroCHLOROthiazide (HYDRODIURIL) 25 MG tablet Take 1 tablet by mouth Daily.      HYDROcodone-acetaminophen (NORCO) 5-325 MG per tablet Take 1 tablet every 4 hours by oral route as needed for 5 days.      latanoprost (XALATAN) 0.005 % ophthalmic solution       metoprolol succinate XL (TOPROL-XL) 100 MG 24 hr tablet 1 tablet Daily.      Multiple Vitamins-Minerals (MULTIVITAL PO) Take 1 tablet by mouth Daily.      mupirocin (BACTROBAN) 2 % nasal ointment Apply to the inside of each nostril with a cotton swab two times daily, morning and evening, for 5 days before surgery. 10 each 0    omeprazole (priLOSEC) 40 MG capsule 1 capsule.      oxybutynin XL (DITROPAN-XL) 10 MG 24 hr tablet Take 1 tablet by mouth Daily.      rosuvastatin (CRESTOR) 40 MG tablet Take 1 tablet by mouth Every Night.         PMH:   Past Medical History:   Diagnosis Date    Arthritis     Arthritis     Bronchitis     Chicken pox     Coronary artery disease     Fibromyalgia     GERD (gastroesophageal reflux disease)     Glaucoma     Glaucoma     History of cardiac cath     History of echocardiogram     History of Holter monitoring     History of migraine     History of PTCA     History of stress test     Hyperlipidemia     Hypertension     Low back pain     Lumbosacral disc disease     Measles     Stomach ulcer     UTI (urinary tract infection)      PSH:    Past Surgical History:   Procedure Laterality Date    BREAST BIOPSY Right     BUNIONECTOMY Bilateral     x4    CATARACT EXTRACTION EXTRACAPSULAR W/ INTRAOCULAR LENS IMPLANTATION Bilateral     CHOLECYSTECTOMY      CORONARY ANGIOPLASTY WITH STENT PLACEMENT      EYE SURGERY Bilateral     CATARACTS REMOVED    KNEE ARTHROSCOPY Right 2021    LUMBAR LAMINECTOMY WITH FUSION N/A  5/2/2019    Procedure: LUMBAR FUSION DECOMPRESSON WITH PEDICLE SCREWS L4-5, LAMINECTOMY, FORAMINOTOMY RIGHT L3-4;  Surgeon: Yoni Borja MD;  Location: [facility];  Service: Neurosurgery    LUMBAR LAMINECTOMY WITH FUSION N/A 1/3/2022    Procedure: LUMBAR FUSION extension DECOMPRESSON WITH PEDICLE SCREWS L3-4, with exploration L2-3, left, discectomy;  Surgeon: Yoni Borja MD;  Location: [facility];  Service: Neurosurgery;  Laterality: N/A;     Patient denies allergy to contrast dye or latex  Immunization History:  Influenza: Yes  Pneumococcal: No  Tetanus: Up-to-date    Social History:   Tobacco:   Social History     Tobacco Use   Smoking Status Never    Passive exposure: Never   Smokeless Tobacco Never      Alcohol:     Social History     Substance and Sexual Activity   Alcohol Use No       Vitals:           /83 (BP Location: Right arm, Patient Position: Lying)   Pulse 72   Temp 97.5 °F (36.4 °C) (Temporal)   Resp 18   SpO2 97%     Physical Exam:  General Appearance:    Alert, cooperative, no distress, appears stated age   Head:    Normocephalic, without obvious abnormality, atraumatic   Lungs:   Clear to auscultation bilaterally to the bases    Heart: S1-S2 without rubs murmurs or gallops    Abdomen:  Soft, nontender, bowel sounds present throughout.   Breast Exam:    deferred   Genitalia:    deferred   Extremities:   Extremities normal, atraumatic, no cyanosis or edema   Skin:   Skin color, texture, turgor normal, no rashes or lesions   Neurologic:   Grossly intact   Results Review  LABS:  Lab Results   Component Value Date    WBC 5.55 12/30/2024    HGB 13.8 12/30/2024    HCT 39.9 12/30/2024    MCV 83.8 12/30/2024     12/30/2024    NEUTROABS 5.73 12/12/2022    GLUCOSE 139 (H) 12/12/2022    BUN 9 12/12/2022    CREATININE 0.90 05/20/2024    EGFRIFNONA 81 12/30/2021     12/12/2022    K 3.5 12/30/2024    CL 95 (L) 12/12/2022    CO2 28.0 12/12/2022    CALCIUM 10.4 12/12/2022    ALBUMIN  4.40 12/12/2022    AST 19 12/12/2022    ALT 14 12/12/2022    BILITOT 0.7 12/12/2022       RADIOLOGY:  No radiology results for the last 3 days     I reviewed the patient's new clinical results.    Cancer Staging (if applicable)  Cancer Patient: __ yes __no __unknown; If yes, clinical stage T:__ N:__M:__, stage group or __N/A    Impression: Bilateral lower extremity neurogenic claudication  Past history of lumbar decompression and fusion  Postlaminectomy syndrome  Hypertension  Hyperlipidemia  Coronary artery disease status post coronary stenting.    Plan: L2-3 lumbar fusion extension/decompression with pedicle screws possible removal of L4-5 hardware      VLAD Gonsalves   01/17/25   6:41 AM EST

## 2025-01-18 LAB
HCT VFR BLD AUTO: 32.7 % (ref 34–46.6)
HGB BLD-MCNC: 11 G/DL (ref 12–15.9)

## 2025-01-18 PROCEDURE — 63710000001 PANTOPRAZOLE 40 MG TABLET DELAYED-RELEASE: Performed by: NEUROLOGICAL SURGERY

## 2025-01-18 PROCEDURE — 63710000001 HYDROCODONE-ACETAMINOPHEN 5-325 MG TABLET: Performed by: NEUROLOGICAL SURGERY

## 2025-01-18 PROCEDURE — A9270 NON-COVERED ITEM OR SERVICE: HCPCS | Performed by: NEUROLOGICAL SURGERY

## 2025-01-18 PROCEDURE — 63710000001 LISINOPRIL 40 MG TABLET: Performed by: NEUROLOGICAL SURGERY

## 2025-01-18 PROCEDURE — 63710000001 HYDROCHLOROTHIAZIDE 25 MG TABLET: Performed by: NEUROLOGICAL SURGERY

## 2025-01-18 PROCEDURE — 63710000001 AMLODIPINE 10 MG TABLET: Performed by: NEUROLOGICAL SURGERY

## 2025-01-18 PROCEDURE — 63710000001 OXYBUTYNIN XL 10 MG TABLET SUSTAINED-RELEASE 24 HOUR: Performed by: NEUROLOGICAL SURGERY

## 2025-01-18 PROCEDURE — 63710000001 OXYCODONE-ACETAMINOPHEN 7.5-325 MG TABLET: Performed by: NEUROLOGICAL SURGERY

## 2025-01-18 PROCEDURE — 63710000001 ROSUVASTATIN 20 MG TABLET: Performed by: NEUROLOGICAL SURGERY

## 2025-01-18 PROCEDURE — 85014 HEMATOCRIT: CPT | Performed by: NEUROLOGICAL SURGERY

## 2025-01-18 PROCEDURE — 63710000001 ASPIRIN 81 MG TABLET DELAYED-RELEASE: Performed by: NEUROLOGICAL SURGERY

## 2025-01-18 PROCEDURE — 85018 HEMOGLOBIN: CPT | Performed by: NEUROLOGICAL SURGERY

## 2025-01-18 PROCEDURE — 63710000001 METOPROLOL SUCCINATE XL 100 MG TABLET SUSTAINED-RELEASE 24 HOUR: Performed by: NEUROLOGICAL SURGERY

## 2025-01-18 PROCEDURE — 63710000001 SENNOSIDES-DOCUSATE 8.6-50 MG TABLET: Performed by: NEUROLOGICAL SURGERY

## 2025-01-18 PROCEDURE — 99024 POSTOP FOLLOW-UP VISIT: CPT | Performed by: NEUROLOGICAL SURGERY

## 2025-01-18 PROCEDURE — 25010000002 HEPARIN (PORCINE) PER 1000 UNITS: Performed by: NEUROLOGICAL SURGERY

## 2025-01-18 RX ORDER — HEPARIN SODIUM 5000 [USP'U]/ML
5000 INJECTION, SOLUTION INTRAVENOUS; SUBCUTANEOUS EVERY 8 HOURS SCHEDULED
Status: DISCONTINUED | OUTPATIENT
Start: 2025-01-18 | End: 2025-01-22 | Stop reason: HOSPADM

## 2025-01-18 RX ADMIN — OXYCODONE HYDROCHLORIDE AND ACETAMINOPHEN 1 TABLET: 7.5; 325 TABLET ORAL at 13:54

## 2025-01-18 RX ADMIN — ROSUVASTATIN 40 MG: 20 TABLET, FILM COATED ORAL at 20:36

## 2025-01-18 RX ADMIN — OXYCODONE HYDROCHLORIDE AND ACETAMINOPHEN 1 TABLET: 7.5; 325 TABLET ORAL at 03:08

## 2025-01-18 RX ADMIN — PANTOPRAZOLE SODIUM 40 MG: 40 TABLET, DELAYED RELEASE ORAL at 05:19

## 2025-01-18 RX ADMIN — HEPARIN SODIUM 5000 UNITS: 5000 INJECTION INTRAVENOUS; SUBCUTANEOUS at 20:37

## 2025-01-18 RX ADMIN — LATANOPROST 1 DROP: 50 SOLUTION OPHTHALMIC at 20:40

## 2025-01-18 RX ADMIN — ASPIRIN 81 MG: 81 TABLET, COATED ORAL at 08:42

## 2025-01-18 RX ADMIN — Medication 10 ML: at 20:56

## 2025-01-18 RX ADMIN — HEPARIN SODIUM 5000 UNITS: 5000 INJECTION INTRAVENOUS; SUBCUTANEOUS at 13:41

## 2025-01-18 RX ADMIN — OXYBUTYNIN CHLORIDE 10 MG: 10 TABLET, EXTENDED RELEASE ORAL at 08:42

## 2025-01-18 RX ADMIN — AMLODIPINE BESYLATE 10 MG: 10 TABLET ORAL at 08:42

## 2025-01-18 RX ADMIN — HYDROCODONE BITARTRATE AND ACETAMINOPHEN 1 TABLET: 5; 325 TABLET ORAL at 05:19

## 2025-01-18 RX ADMIN — OXYCODONE HYDROCHLORIDE AND ACETAMINOPHEN 1 TABLET: 7.5; 325 TABLET ORAL at 20:36

## 2025-01-18 RX ADMIN — Medication 10 ML: at 08:43

## 2025-01-18 RX ADMIN — SENNOSIDES AND DOCUSATE SODIUM 2 TABLET: 50; 8.6 TABLET ORAL at 20:36

## 2025-01-18 RX ADMIN — METOPROLOL SUCCINATE 100 MG: 100 TABLET, EXTENDED RELEASE ORAL at 08:42

## 2025-01-18 RX ADMIN — HYDROCHLOROTHIAZIDE 25 MG: 25 TABLET ORAL at 08:39

## 2025-01-18 RX ADMIN — LISINOPRIL 40 MG: 40 TABLET ORAL at 08:42

## 2025-01-18 RX ADMIN — SENNOSIDES AND DOCUSATE SODIUM 2 TABLET: 50; 8.6 TABLET ORAL at 08:38

## 2025-01-18 NOTE — PLAN OF CARE
Goal Outcome Evaluation:      Pt HOB flat throughout the day. Pt concerned because she has not had a bowel movement. Stool softeners given. Bowel sounds audible. Pain controlled well with oral medication.

## 2025-01-18 NOTE — PROGRESS NOTES
"NEUROSURGERY PROGRESS NOTE     LOS: 0 days   Patient Care Team:  Regan Delgado MD as PCP - General (Internal Medicine)  Cari Rodriguez MD as Referring Physician (Internal Medicine)  Yoni Borja MD as Consulting Physician (Neurosurgery)    Chief Complaint: Low back and lower extremity pain.    POD#: 1 Day Post-Op  Procedures:  L2-3 PLIF extension    Interval History:   Patient Complaints: Incisional pain.  Patient Denies: Preoperative severe hip and thigh pain.    Vital Signs: Blood pressure 130/64, pulse 79, temperature 98 °F (36.7 °C), temperature source Oral, resp. rate 16, height 160 cm (62.99\"), weight 83.3 kg (183 lb 10.3 oz), SpO2 99%, not currently breastfeeding.  Intake/Output:   Intake/Output Summary (Last 24 hours) at 1/18/2025 0843  Last data filed at 1/18/2025 0314  Gross per 24 hour   Intake 1700 ml   Output 2955 ml   Net -1255 ml     Drain output: 35/120 mL.    Physical Exam:  Patient is awake and alert.  She is in good spirits.  There is mild heme staining on her dressing.  Motor function is intact in her lower extremities.     Data Review:    Results from last 7 days   Lab Units 01/18/25  0642   HEMOGLOBIN g/dL 11.0*   HEMATOCRIT % 32.7*       Assessment/Plan:  1.  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  2.  Coronary artery disease.  3.  Fibromyalgia.  4.  Hypertension.  5.  Obesity  6.  DVT prophylaxis: Mechanical/subcu heparin.  7.  Disposition: Continue bedrest/out of bed flat through the weekend given small dural rent.  Continue drain until output diminishes or if output increases substantially.      Yoni Borja MD  01/18/25  08:43 EST    "

## 2025-01-18 NOTE — PLAN OF CARE
On strict bedrest with HOB flat. Patient c/o constant localized soreness / spasm to back incisional site, requires around clock PO analgesics for adequate pain control. Border dressing CDI. Small amount of dark red draihage present in Hemovac. VSS on RA. IS encouraged. Catheter patency maintained. Call light in reach.  Daughter at bedside.

## 2025-01-19 PROCEDURE — 63710000001 METOPROLOL SUCCINATE XL 100 MG TABLET SUSTAINED-RELEASE 24 HOUR: Performed by: NEUROLOGICAL SURGERY

## 2025-01-19 PROCEDURE — A9270 NON-COVERED ITEM OR SERVICE: HCPCS | Performed by: NEUROLOGICAL SURGERY

## 2025-01-19 PROCEDURE — 63710000001 OXYCODONE-ACETAMINOPHEN 7.5-325 MG TABLET: Performed by: NEUROLOGICAL SURGERY

## 2025-01-19 PROCEDURE — 63710000001 OXYBUTYNIN XL 10 MG TABLET SUSTAINED-RELEASE 24 HOUR: Performed by: NEUROLOGICAL SURGERY

## 2025-01-19 PROCEDURE — 63710000001 ROSUVASTATIN 20 MG TABLET: Performed by: NEUROLOGICAL SURGERY

## 2025-01-19 PROCEDURE — 63710000001 LISINOPRIL 40 MG TABLET: Performed by: NEUROLOGICAL SURGERY

## 2025-01-19 PROCEDURE — 25010000002 HEPARIN (PORCINE) PER 1000 UNITS: Performed by: NEUROLOGICAL SURGERY

## 2025-01-19 PROCEDURE — 63710000001 HYDROCODONE-ACETAMINOPHEN 5-325 MG TABLET: Performed by: NEUROLOGICAL SURGERY

## 2025-01-19 PROCEDURE — 63710000001 POLYETHYLENE GLYCOL 17 G PACK: Performed by: NEUROLOGICAL SURGERY

## 2025-01-19 PROCEDURE — 63710000001 AMLODIPINE 10 MG TABLET: Performed by: NEUROLOGICAL SURGERY

## 2025-01-19 PROCEDURE — 63710000001 PANTOPRAZOLE 40 MG TABLET DELAYED-RELEASE: Performed by: NEUROLOGICAL SURGERY

## 2025-01-19 PROCEDURE — 63710000001 ASPIRIN 81 MG TABLET DELAYED-RELEASE: Performed by: NEUROLOGICAL SURGERY

## 2025-01-19 PROCEDURE — 63710000001 HYDROCHLOROTHIAZIDE 25 MG TABLET: Performed by: NEUROLOGICAL SURGERY

## 2025-01-19 PROCEDURE — 63710000001 SENNOSIDES-DOCUSATE 8.6-50 MG TABLET: Performed by: NEUROLOGICAL SURGERY

## 2025-01-19 PROCEDURE — 63710000001 BISACODYL 10 MG SUPPOSITORY: Performed by: NEUROLOGICAL SURGERY

## 2025-01-19 RX ADMIN — POLYETHYLENE GLYCOL 3350 17 G: 17 POWDER, FOR SOLUTION ORAL at 09:51

## 2025-01-19 RX ADMIN — ROSUVASTATIN 40 MG: 20 TABLET, FILM COATED ORAL at 20:37

## 2025-01-19 RX ADMIN — HEPARIN SODIUM 5000 UNITS: 5000 INJECTION INTRAVENOUS; SUBCUTANEOUS at 06:22

## 2025-01-19 RX ADMIN — OXYCODONE HYDROCHLORIDE AND ACETAMINOPHEN 1 TABLET: 7.5; 325 TABLET ORAL at 06:23

## 2025-01-19 RX ADMIN — Medication 10 ML: at 20:38

## 2025-01-19 RX ADMIN — PANTOPRAZOLE SODIUM 40 MG: 40 TABLET, DELAYED RELEASE ORAL at 06:23

## 2025-01-19 RX ADMIN — OXYCODONE HYDROCHLORIDE AND ACETAMINOPHEN 1 TABLET: 7.5; 325 TABLET ORAL at 02:24

## 2025-01-19 RX ADMIN — METOPROLOL SUCCINATE 100 MG: 100 TABLET, EXTENDED RELEASE ORAL at 09:51

## 2025-01-19 RX ADMIN — HYDROCODONE BITARTRATE AND ACETAMINOPHEN 1 TABLET: 5; 325 TABLET ORAL at 22:38

## 2025-01-19 RX ADMIN — HYDROCODONE BITARTRATE AND ACETAMINOPHEN 1 TABLET: 5; 325 TABLET ORAL at 18:37

## 2025-01-19 RX ADMIN — ASPIRIN 81 MG: 81 TABLET, COATED ORAL at 09:51

## 2025-01-19 RX ADMIN — LISINOPRIL 40 MG: 40 TABLET ORAL at 09:51

## 2025-01-19 RX ADMIN — SENNOSIDES AND DOCUSATE SODIUM 2 TABLET: 50; 8.6 TABLET ORAL at 20:37

## 2025-01-19 RX ADMIN — LATANOPROST 1 DROP: 50 SOLUTION OPHTHALMIC at 20:39

## 2025-01-19 RX ADMIN — HYDROCODONE BITARTRATE AND ACETAMINOPHEN 1 TABLET: 5; 325 TABLET ORAL at 15:05

## 2025-01-19 RX ADMIN — HYDROCODONE BITARTRATE AND ACETAMINOPHEN 1 TABLET: 5; 325 TABLET ORAL at 09:51

## 2025-01-19 RX ADMIN — HEPARIN SODIUM 5000 UNITS: 5000 INJECTION INTRAVENOUS; SUBCUTANEOUS at 15:04

## 2025-01-19 RX ADMIN — SENNOSIDES AND DOCUSATE SODIUM 2 TABLET: 50; 8.6 TABLET ORAL at 09:51

## 2025-01-19 RX ADMIN — AMLODIPINE BESYLATE 10 MG: 10 TABLET ORAL at 09:51

## 2025-01-19 RX ADMIN — BISACODYL 10 MG: 10 SUPPOSITORY RECTAL at 18:36

## 2025-01-19 RX ADMIN — HEPARIN SODIUM 5000 UNITS: 5000 INJECTION INTRAVENOUS; SUBCUTANEOUS at 22:38

## 2025-01-19 RX ADMIN — HYDROCHLOROTHIAZIDE 25 MG: 25 TABLET ORAL at 09:51

## 2025-01-19 RX ADMIN — OXYBUTYNIN CHLORIDE 10 MG: 10 TABLET, EXTENDED RELEASE ORAL at 09:51

## 2025-01-19 RX ADMIN — Medication 10 ML: at 09:51

## 2025-01-19 NOTE — PROGRESS NOTES
"NEUROSURGERY PROGRESS NOTE    Chief Complaint: Lumbar stenosis    Subjective: Stable overnight. Denies headache or leg pain.  Feeling well this morning.    Objective    Vital Signs: Blood pressure 122/62, pulse 77, temperature 97.6 °F (36.4 °C), temperature source Oral, resp. rate 16, height 160 cm (62.99\"), weight 83.3 kg (183 lb 10.3 oz), SpO2 96%, not currently breastfeeding.    Physical Exam  Awake, alert and oriented x 3  Opens eyes spont  Pupils 3 mm reactive bilaterally  Extraocular muscles intact bilaterally  Face symmetric bilaterally  Tongue midline  5/5 in the lower extremities bilaterally  Dressing dry    Intake/Output:   Intake/Output Summary (Last 24 hours) at 1/19/2025 0918  Last data filed at 1/19/2025 0900  Gross per 24 hour   Intake 236 ml   Output 3950 ml   Net -3714 ml       Current Medications:   Current Facility-Administered Medications:     acetaminophen (TYLENOL) tablet 650 mg, 650 mg, Oral, Q4H PRN **OR** acetaminophen (TYLENOL) 160 MG/5ML oral solution 650 mg, 650 mg, Oral, Q4H PRN **OR** acetaminophen (TYLENOL) suppository 650 mg, 650 mg, Rectal, Q4H PRN, Yoni Borja MD    amLODIPine (NORVASC) tablet 10 mg, 10 mg, Oral, Daily, Yoni Borja MD, 10 mg at 01/18/25 0842    aspirin EC tablet 81 mg, 81 mg, Oral, Daily, Yoni Borja MD, 81 mg at 01/18/25 0842    sennosides-docusate (PERICOLACE) 8.6-50 MG per tablet 2 tablet, 2 tablet, Oral, BID, 2 tablet at 01/18/25 2036 **AND** polyethylene glycol (MIRALAX) packet 17 g, 17 g, Oral, Daily PRN **AND** bisacodyl (DULCOLAX) EC tablet 5 mg, 5 mg, Oral, Daily PRN **AND** bisacodyl (DULCOLAX) suppository 10 mg, 10 mg, Rectal, Daily PRN, Yoni Borja MD    diphenhydrAMINE (BENADRYL) capsule 25 mg, 25 mg, Oral, Nightly PRN, Yoni Borja MD    heparin (porcine) 5000 UNIT/ML injection 5,000 Units, 5,000 Units, Subcutaneous, Q8H, Yoni Borja MD, 5,000 Units at 01/19/25 0622    hydroCHLOROthiazide tablet 25 mg, 25 mg, Oral, " Daily, Yoni Boraj MD, 25 mg at 01/18/25 0839    HYDROcodone-acetaminophen (NORCO) 5-325 MG per tablet 1 tablet, 1 tablet, Oral, Q4H PRN, Yoni Borja MD, 1 tablet at 01/18/25 0519    latanoprost (XALATAN) 0.005 % ophthalmic solution 1 drop, 1 drop, Both Eyes, Nightly, Yoni Borja MD, 1 drop at 01/18/25 2040    lisinopril (PRINIVIL,ZESTRIL) tablet 40 mg, 40 mg, Oral, Q24H, Yoni Borja MD, 40 mg at 01/18/25 0842    metoprolol succinate XL (TOPROL-XL) 24 hr tablet 100 mg, 100 mg, Oral, Daily, Yoni Borja MD, 100 mg at 01/18/25 0842    morphine injection 2 mg, 2 mg, Intravenous, Q2H PRN, 2 mg at 01/17/25 1646 **AND** naloxone (NARCAN) injection 0.4 mg, 0.4 mg, Intravenous, Q5 Min PRN, Yoni Borja MD    ondansetron ODT (ZOFRAN-ODT) disintegrating tablet 4 mg, 4 mg, Oral, Q6H PRN **OR** ondansetron (ZOFRAN) injection 4 mg, 4 mg, Intravenous, Q6H PRN, Yoni Borja MD    oxybutynin XL (DITROPAN-XL) 24 hr tablet 10 mg, 10 mg, Oral, Daily, Yoni Borja MD, 10 mg at 01/18/25 0842    oxyCODONE-acetaminophen (PERCOCET) 7.5-325 MG per tablet 1 tablet, 1 tablet, Oral, Q4H PRN, Yoni Borja MD, 1 tablet at 01/19/25 0623    pantoprazole (PROTONIX) EC tablet 40 mg, 40 mg, Oral, Q AM, Yoni Borja MD, 40 mg at 01/19/25 0623    promethazine (PHENERGAN) tablet 12.5 mg, 12.5 mg, Oral, Q6H PRN **OR** promethazine (PHENERGAN) suppository 12.5 mg, 12.5 mg, Rectal, Q6H PRN, Yoni Borja MD    rosuvastatin (CRESTOR) tablet 40 mg, 40 mg, Oral, Nightly, Yoni Borja MD, 40 mg at 01/18/25 2036    sodium chloride 0.9 % flush 10 mL, 10 mL, Intravenous, Q12H, Yoni Borja MD, 10 mL at 01/18/25 2056    sodium chloride 0.9 % flush 10 mL, 10 mL, Intravenous, PRN, Yoni Borja MD    sodium chloride 0.9 % infusion 40 mL, 40 mL, Intravenous, PRRAYMON, Yoni Borja MD    Facility-Administered Medications Ordered in Other Encounters:     mupirocin (BACTROBAN) 2 % nasal ointment, ,  Nasal, BID, Yoni Borja MD     Laboratory Results:       Lab 01/18/25  0642   HEMOGLOBIN 11.0*   HEMATOCRIT 32.7*         Lab 01/17/25  0619   POTASSIUM 3.5                         Brief Urine Lab Results       None          Microbiology Results (last 10 days)       ** No results found for the last 240 hours. **             Diagnostic Imaging: I reviewed and independently interpreted the new imaging.     Assessment/Plan:    No diagnosis found.     This is a 74-year-old female status post extension of a prior lumbar fusion.  Patient has remained neurologically stable overnight and is not having any headache.  We will continue to keep her flat and keep the drain in place.  Please call if drain output significantly increases.      Any copied data from previous notes included in the (1) History of Present Illness, (2) Physical Examination and (3) Medical Decision Making and/or Assessment and Plan has been reviewed and is accurate as of 01/19/25      Rolando Gutierrez MD  01/19/25  09:18 EST

## 2025-01-20 VITALS
HEIGHT: 63 IN | DIASTOLIC BLOOD PRESSURE: 77 MMHG | OXYGEN SATURATION: 91 % | TEMPERATURE: 97.6 F | BODY MASS INDEX: 32.54 KG/M2 | HEART RATE: 74 BPM | WEIGHT: 183.64 LBS | RESPIRATION RATE: 18 BRPM | SYSTOLIC BLOOD PRESSURE: 116 MMHG

## 2025-01-20 PROBLEM — Z98.1 S/P LUMBAR FUSION: Status: ACTIVE | Noted: 2025-01-20

## 2025-01-20 PROCEDURE — 63710000001 DIPHENHYDRAMINE PER 50 MG: Performed by: NEUROLOGICAL SURGERY

## 2025-01-20 PROCEDURE — A9270 NON-COVERED ITEM OR SERVICE: HCPCS | Performed by: NEUROLOGICAL SURGERY

## 2025-01-20 PROCEDURE — 63710000001 OXYBUTYNIN XL 10 MG TABLET SUSTAINED-RELEASE 24 HOUR: Performed by: NEUROLOGICAL SURGERY

## 2025-01-20 PROCEDURE — 63710000001 PANTOPRAZOLE 40 MG TABLET DELAYED-RELEASE: Performed by: NEUROLOGICAL SURGERY

## 2025-01-20 PROCEDURE — 63710000001 HYDROCHLOROTHIAZIDE 25 MG TABLET: Performed by: NEUROLOGICAL SURGERY

## 2025-01-20 PROCEDURE — 63710000001 AMLODIPINE 10 MG TABLET: Performed by: NEUROLOGICAL SURGERY

## 2025-01-20 PROCEDURE — 63710000001 OXYCODONE-ACETAMINOPHEN 7.5-325 MG TABLET: Performed by: NEUROLOGICAL SURGERY

## 2025-01-20 PROCEDURE — 63710000001 SENNOSIDES-DOCUSATE 8.6-50 MG TABLET: Performed by: NEUROLOGICAL SURGERY

## 2025-01-20 PROCEDURE — 63710000001 BISACODYL 5 MG TABLET DELAYED-RELEASE: Performed by: NEUROLOGICAL SURGERY

## 2025-01-20 PROCEDURE — 99024 POSTOP FOLLOW-UP VISIT: CPT | Performed by: NEUROLOGICAL SURGERY

## 2025-01-20 PROCEDURE — 63710000001 ASPIRIN 81 MG TABLET DELAYED-RELEASE: Performed by: NEUROLOGICAL SURGERY

## 2025-01-20 PROCEDURE — 63710000001 HYDROCODONE-ACETAMINOPHEN 5-325 MG TABLET: Performed by: NEUROLOGICAL SURGERY

## 2025-01-20 PROCEDURE — 25010000002 HEPARIN (PORCINE) PER 1000 UNITS: Performed by: NEUROLOGICAL SURGERY

## 2025-01-20 PROCEDURE — 63710000001 METOPROLOL SUCCINATE XL 100 MG TABLET SUSTAINED-RELEASE 24 HOUR: Performed by: NEUROLOGICAL SURGERY

## 2025-01-20 RX ADMIN — HYDROCODONE BITARTRATE AND ACETAMINOPHEN 1 TABLET: 5; 325 TABLET ORAL at 20:42

## 2025-01-20 RX ADMIN — Medication 10 ML: at 20:50

## 2025-01-20 RX ADMIN — OXYCODONE HYDROCHLORIDE AND ACETAMINOPHEN 1 TABLET: 7.5; 325 TABLET ORAL at 09:32

## 2025-01-20 RX ADMIN — SENNOSIDES AND DOCUSATE SODIUM 2 TABLET: 50; 8.6 TABLET ORAL at 20:42

## 2025-01-20 RX ADMIN — LATANOPROST 1 DROP: 50 SOLUTION OPHTHALMIC at 20:46

## 2025-01-20 RX ADMIN — AMLODIPINE BESYLATE 10 MG: 10 TABLET ORAL at 09:31

## 2025-01-20 RX ADMIN — OXYBUTYNIN CHLORIDE 10 MG: 10 TABLET, EXTENDED RELEASE ORAL at 09:31

## 2025-01-20 RX ADMIN — HEPARIN SODIUM 5000 UNITS: 5000 INJECTION INTRAVENOUS; SUBCUTANEOUS at 05:34

## 2025-01-20 RX ADMIN — HEPARIN SODIUM 5000 UNITS: 5000 INJECTION INTRAVENOUS; SUBCUTANEOUS at 14:30

## 2025-01-20 RX ADMIN — HYDROCHLOROTHIAZIDE 25 MG: 25 TABLET ORAL at 09:32

## 2025-01-20 RX ADMIN — OXYCODONE HYDROCHLORIDE AND ACETAMINOPHEN 1 TABLET: 7.5; 325 TABLET ORAL at 18:53

## 2025-01-20 RX ADMIN — BISACODYL 5 MG: 5 TABLET, COATED ORAL at 09:31

## 2025-01-20 RX ADMIN — HYDROCODONE BITARTRATE AND ACETAMINOPHEN 1 TABLET: 5; 325 TABLET ORAL at 05:34

## 2025-01-20 RX ADMIN — ASPIRIN 81 MG: 81 TABLET, COATED ORAL at 09:31

## 2025-01-20 RX ADMIN — METOPROLOL SUCCINATE 100 MG: 100 TABLET, EXTENDED RELEASE ORAL at 09:31

## 2025-01-20 RX ADMIN — HEPARIN SODIUM 5000 UNITS: 5000 INJECTION INTRAVENOUS; SUBCUTANEOUS at 20:43

## 2025-01-20 RX ADMIN — Medication 10 ML: at 09:32

## 2025-01-20 RX ADMIN — DIPHENHYDRAMINE HYDROCHLORIDE 25 MG: 25 CAPSULE ORAL at 20:42

## 2025-01-20 RX ADMIN — ROSUVASTATIN 40 MG: 20 TABLET, FILM COATED ORAL at 20:42

## 2025-01-20 RX ADMIN — PANTOPRAZOLE SODIUM 40 MG: 40 TABLET, DELAYED RELEASE ORAL at 05:34

## 2025-01-20 RX ADMIN — SENNOSIDES AND DOCUSATE SODIUM 2 TABLET: 50; 8.6 TABLET ORAL at 09:31

## 2025-01-20 NOTE — PAYOR COMM NOTE
"Neris Chapin RN  [facility]  Utilization Management  P:[phone]  F:[phone]    Ref # [id number]   Ambulatory procedure to IP due to intra-op dural tear    Eneida Bartholomew (74 y.o. Female)       Date of Birth   1950    Social Security Number       Address   [address] [place] [address]    Home Phone   [phone]    MRN   [id number]       [facility]    Marital Status                               Admission Date   1/17/25    Admission Type   Elective    Admitting Provider   Yoni Borja MD    Attending Provider   Yoni Borja MD    Department, Room/Bed   [facility] 3G, S356/1       Discharge Date       Discharge Disposition       Discharge Destination                                 Attending Provider: Yoni Borja MD    Allergies: Other, Prednisone, Shellfish-derived Products, Iodinated Contrast Media, Erythromycin, Gabapentin, Omeprazole, Sulfa Antibiotics, Tramadol    Isolation: None   Infection: None   Code Status: CPR    Ht: 160 cm (62.99\")   Wt: 83.3 kg (183 lb 10.3 oz)    Admission Cmt: None   Principal Problem: Lumbar spinal stenosis due to adjacent segment disease after fusion procedure [M48.061,M51.369,Z98.1]                   Active Insurance as of 1/17/2025       Primary Coverage       Payor Plan Insurance Group Employer/Plan Group    ANTHEM MEDICARE REPLACEMENT ANTHEM MED ADV HMO [id number]       Payor Plan Address Payor Plan Phone Number Payor Plan Fax Number Effective Dates    [address] [phone]  1/1/2024 - None Entered    [facility] [id number]         Subscriber Name Subscriber Birth Date Member ID       ENEIDA BARTHOLOMEW 1950 [id number]                     Emergency Contacts        (Rel.) Home Phone Work Phone Mobile Phone    Ilir,Julieta (Daughter) [phone] -- [phone]    SteveMartina lennonilah (Sister) [phone] -- [phone]    MATTY BETHEA (Sister) " "[phone] -- [phone]    Mark Castle (Daughter) -- -- [phone]                 History & Physical        Vinod Kebede PA at 01/17/25 0687       Attestation signed by Yoni Borja MD at 01/17/25 0972    .                Pre-Op H&P  Eneida Bartholomew  [id number]  1950    Chief complaint: \"Both of my legs are killing me\"    HPI:    Patient is a 74 y.o.female who presents with past history of lumbar decompression and fusion he has done fairly well.  Patient has continued recently over the last 9 months to a year to have extended back pain down bilateral lower extremities.  She has failed conservative therapy.  With her bilateral lower extremity neurogenic claudication the patient is now admitted for decompression and fusion.    Review of Systems:  General ROS: negative for chills, fever or skin lesions;  No changes since last office visit.  Neg for recent sick exposure  Cardiovascular ROS: no chest pain or dyspnea on exertion  Respiratory ROS: no cough, shortness of breath, or wheezing    Allergies:   Allergies   Allergen Reactions    Other Shortness Of Breath     Lavendar scent caused SOA and swelling of throat    Prednisone Shortness Of Breath     And nervous    Shellfish-Derived Products Anaphylaxis, Rash, Shortness Of Breath and Swelling     ALL SEAFOOD, FISH    Iodinated Contrast Media Other (See Comments)     Other reaction(s): Feels unwell, Hypotension    Erythromycin Rash    Gabapentin Unknown - Low Severity     STRANGE FEELING    Omeprazole Rash    Sulfa Antibiotics Rash    Tramadol Unknown - Low Severity       Home Meds:    Current Facility-Administered Medications on File Prior to Encounter   Medication Dose Route Frequency Provider Last Rate Last Admin    mupirocin (BACTROBAN) 2 % nasal ointment   Nasal BID Yoni Borja MD         Current Outpatient Medications on File Prior to Encounter   Medication Sig Dispense Refill    amLODIPine (NORVASC) 10 MG tablet Take 1 tablet by " mouth Daily.      aspirin 81 MG EC tablet Take 1 tablet by mouth Daily.      baclofen (LIORESAL) 10 MG tablet 1 tablet 3 (Three) Times a Day As Needed.      benazepril (LOTENSIN) 40 MG tablet Take 1 tablet by mouth Daily.      Chlorhexidine Gluconate 4 % solution Shower each day with solution for 5 days beginning 5 days before surgery. 120 mL 0    Flaxseed, Linseed, (FLAX SEED OIL PO) Take  by mouth.      fluticasone (FLONASE) 50 MCG/ACT nasal spray 1 spray.      hydroCHLOROthiazide (HYDRODIURIL) 25 MG tablet Take 1 tablet by mouth Daily.      HYDROcodone-acetaminophen (NORCO) 5-325 MG per tablet Take 1 tablet every 4 hours by oral route as needed for 5 days.      latanoprost (XALATAN) 0.005 % ophthalmic solution       metoprolol succinate XL (TOPROL-XL) 100 MG 24 hr tablet 1 tablet Daily.      Multiple Vitamins-Minerals (MULTIVITAL PO) Take 1 tablet by mouth Daily.      mupirocin (BACTROBAN) 2 % nasal ointment Apply to the inside of each nostril with a cotton swab two times daily, morning and evening, for 5 days before surgery. 10 each 0    omeprazole (priLOSEC) 40 MG capsule 1 capsule.      oxybutynin XL (DITROPAN-XL) 10 MG 24 hr tablet Take 1 tablet by mouth Daily.      rosuvastatin (CRESTOR) 40 MG tablet Take 1 tablet by mouth Every Night.         PMH:   Past Medical History:   Diagnosis Date    Arthritis     Arthritis     Bronchitis     Chicken pox     Coronary artery disease     Fibromyalgia     GERD (gastroesophageal reflux disease)     Glaucoma     Glaucoma     History of cardiac cath     History of echocardiogram     History of Holter monitoring     History of migraine     History of PTCA     History of stress test     Hyperlipidemia     Hypertension     Low back pain     Lumbosacral disc disease     Measles     Stomach ulcer     UTI (urinary tract infection)      PSH:    Past Surgical History:   Procedure Laterality Date    BREAST BIOPSY Right     BUNIONECTOMY Bilateral     x4    CATARACT EXTRACTION  EXTRACAPSULAR W/ INTRAOCULAR LENS IMPLANTATION Bilateral     CHOLECYSTECTOMY      CORONARY ANGIOPLASTY WITH STENT PLACEMENT      EYE SURGERY Bilateral     CATARACTS REMOVED    KNEE ARTHROSCOPY Right 2021    LUMBAR LAMINECTOMY WITH FUSION N/A 5/2/2019    Procedure: LUMBAR FUSION DECOMPRESSON WITH PEDICLE SCREWS L4-5, LAMINECTOMY, FORAMINOTOMY RIGHT L3-4;  Surgeon: Yoni Borja MD;  Location: [facility] OR;  Service: Neurosurgery    LUMBAR LAMINECTOMY WITH FUSION N/A 1/3/2022    Procedure: LUMBAR FUSION extension DECOMPRESSON WITH PEDICLE SCREWS L3-4, with exploration L2-3, left, discectomy;  Surgeon: Yoni Borja MD;  Location: [facility] OR;  Service: Neurosurgery;  Laterality: N/A;     Patient denies allergy to contrast dye or latex  Immunization History:  Influenza: Yes  Pneumococcal: No  Tetanus: Up-to-date    Social History:   Tobacco:   Social History     Tobacco Use   Smoking Status Never    Passive exposure: Never   Smokeless Tobacco Never      Alcohol:     Social History     Substance and Sexual Activity   Alcohol Use No       Vitals:           /83 (BP Location: Right arm, Patient Position: Lying)   Pulse 72   Temp 97.5 °F (36.4 °C) (Temporal)   Resp 18   SpO2 97%     Physical Exam:  General Appearance:    Alert, cooperative, no distress, appears stated age   Head:    Normocephalic, without obvious abnormality, atraumatic   Lungs:   Clear to auscultation bilaterally to the bases    Heart: S1-S2 without rubs murmurs or gallops    Abdomen:  Soft, nontender, bowel sounds present throughout.   Breast Exam:    deferred   Genitalia:    deferred   Extremities:   Extremities normal, atraumatic, no cyanosis or edema   Skin:   Skin color, texture, turgor normal, no rashes or lesions   Neurologic:   Grossly intact   Results Review  LABS:  Lab Results   Component Value Date    WBC 5.55 12/30/2024    HGB 13.8 12/30/2024    HCT 39.9 12/30/2024    MCV 83.8 12/30/2024     12/30/2024    NEUTROABS 5.73  12/12/2022    GLUCOSE 139 (H) 12/12/2022    BUN 9 12/12/2022    CREATININE 0.90 05/20/2024    EGFRIFNONA 81 12/30/2021     12/12/2022    K 3.5 12/30/2024    CL 95 (L) 12/12/2022    CO2 28.0 12/12/2022    CALCIUM 10.4 12/12/2022    ALBUMIN 4.40 12/12/2022    AST 19 12/12/2022    ALT 14 12/12/2022    BILITOT 0.7 12/12/2022       RADIOLOGY:  No radiology results for the last 3 days     I reviewed the patient's new clinical results.    Cancer Staging (if applicable)  Cancer Patient: __ yes __no __unknown; If yes, clinical stage T:__ N:__M:__, stage group or __N/A    Impression: Bilateral lower extremity neurogenic claudication  Past history of lumbar decompression and fusion  Postlaminectomy syndrome  Hypertension  Hyperlipidemia  Coronary artery disease status post coronary stenting.    Plan: L2-3 lumbar fusion extension/decompression with pedicle screws possible removal of L4-5 hardware      VLAD Gonsalves   01/17/25   6:41 AM EST     Electronically signed by Yoni Borja MD at 01/17/25 0947       Current Facility-Administered Medications   Medication Dose Route Frequency Provider Last Rate Last Admin    acetaminophen (TYLENOL) tablet 650 mg  650 mg Oral Q4H PRN Yoni Borja MD        Or    acetaminophen (TYLENOL) 160 MG/5ML oral solution 650 mg  650 mg Oral Q4H PRN Yoni Borja MD        Or    acetaminophen (TYLENOL) suppository 650 mg  650 mg Rectal Q4H PRN Yoni Borja MD        amLODIPine (NORVASC) tablet 10 mg  10 mg Oral Daily Yoni Borja MD   10 mg at 01/20/25 0931    aspirin EC tablet 81 mg  81 mg Oral Daily Yoni Borja MD   81 mg at 01/20/25 0931    sennosides-docusate (PERICOLACE) 8.6-50 MG per tablet 2 tablet  2 tablet Oral BID Yoni Borja MD   2 tablet at 01/20/25 0931    And    polyethylene glycol (MIRALAX) packet 17 g  17 g Oral Daily PRN Yoni Borja MD   17 g at 01/19/25 0951    And    bisacodyl (DULCOLAX) EC tablet 5 mg  5 mg Oral Daily PRN  Yoni Borja MD   5 mg at 01/20/25 0931    And    bisacodyl (DULCOLAX) suppository 10 mg  10 mg Rectal Daily PRN Yoni Borja MD   10 mg at 01/19/25 1836    diphenhydrAMINE (BENADRYL) capsule 25 mg  25 mg Oral Nightly PRN Yoni Borja MD        heparin (porcine) 5000 UNIT/ML injection 5,000 Units  5,000 Units Subcutaneous Q8H Yoni Borja MD   5,000 Units at 01/20/25 0534    hydroCHLOROthiazide tablet 25 mg  25 mg Oral Daily Yoni Borja MD   25 mg at 01/20/25 0932    HYDROcodone-acetaminophen (NORCO) 5-325 MG per tablet 1 tablet  1 tablet Oral Q4H PRN Yoni Borja MD   1 tablet at 01/20/25 0534    latanoprost (XALATAN) 0.005 % ophthalmic solution 1 drop  1 drop Both Eyes Nightly Yoni Borja MD   1 drop at 01/19/25 2039    lisinopril (PRINIVIL,ZESTRIL) tablet 40 mg  40 mg Oral Q24H Yoni Borja MD   40 mg at 01/19/25 0951    metoprolol succinate XL (TOPROL-XL) 24 hr tablet 100 mg  100 mg Oral Daily Yoni Borja MD   100 mg at 01/20/25 0931    morphine injection 2 mg  2 mg Intravenous Q2H PRN Yoni Borja MD   2 mg at 01/17/25 1646    And    naloxone (NARCAN) injection 0.4 mg  0.4 mg Intravenous Q5 Min PRN Yoni Borja MD        ondansetron ODT (ZOFRAN-ODT) disintegrating tablet 4 mg  4 mg Oral Q6H PRN Yoni Borja MD        Or    ondansetron (ZOFRAN) injection 4 mg  4 mg Intravenous Q6H PRN Yoni Borja MD        oxybutynin XL (DITROPAN-XL) 24 hr tablet 10 mg  10 mg Oral Daily Yoni Borja MD   10 mg at 01/20/25 0931    oxyCODONE-acetaminophen (PERCOCET) 7.5-325 MG per tablet 1 tablet  1 tablet Oral Q4H PRN Yoni Borja MD   1 tablet at 01/20/25 0932    pantoprazole (PROTONIX) EC tablet 40 mg  40 mg Oral Q AM Yoni Borja MD   40 mg at 01/20/25 0534    promethazine (PHENERGAN) tablet 12.5 mg  12.5 mg Oral Q6H PRN Yoni Borja MD        Or    promethazine (PHENERGAN) suppository 12.5 mg  12.5 mg Rectal Q6H PRN Yoni Borja MD         rosuvastatin (CRESTOR) tablet 40 mg  40 mg Oral Nightly Yoni Borja MD   40 mg at 01/19/25 2037    sodium chloride 0.9 % flush 10 mL  10 mL Intravenous Q12H Yoni Borja MD   10 mL at 01/20/25 0932    sodium chloride 0.9 % flush 10 mL  10 mL Intravenous PRN Yoni Borja MD        sodium chloride 0.9 % infusion 40 mL  40 mL Intravenous PRN Yoni Borja MD         Facility-Administered Medications Ordered in Other Encounters   Medication Dose Route Frequency Provider Last Rate Last Admin    mupirocin (BACTROBAN) 2 % nasal ointment   Nasal BID Yoni Borja MD         Lab Results (last 24 hours)       ** No results found for the last 24 hours. **          Imaging Results (All)       Procedure Component Value Units Date/Time    FL C Arm During Surgery [[id number]] Resulted: 01/17/25 0958     Updated: 01/17/25 0958    Narrative:      This procedure was auto-finalized with no dictation required.    FL O Arm During Surgery [[id number]] Resulted: 01/17/25 0752     Updated: 01/17/25 0752    Narrative:      This procedure was auto-finalized with no dictation required.             Operative/Procedure Notes (all)        Yoni Borja MD at 01/17/25 0711  Version 2 of 2         NEUROSURGICAL OPERATIVE NOTE        PREOPERATIVE DIAGNOSIS:    L2-3 stenosis and instability  Lumbar transition syndrome      POSTOPERATIVE DIAGNOSIS:  Same      PROCEDURE:  1.  Arthrodesis interbody type L2-3  2.  Bilateral L2-3 discectomy with right L2-3 Adaptix cage placement  3.  Nonsegmental pedicle screw fixation L2 to the prior construct utilizing PEEK Legacy  4.  Use of [place] and local autograft  5.  Removal of old pedicle screw hardware at L5  6.  Stealth frameless stereotaxy utilized in conjunction with O arm imaging      SURGEON:  Yoni Borja M.D.      ASSISTANT: Wendy Cuello PA-C    PAC assisted with:   Suctioning   Retraction   Tying   Suturing   Closing   Application of dressing   Skilled  neurosurgery PA assistance was necessary to perform this procedure.        ANESTHESIA:  General      ESTIMATED BLOOD LOSS: 150 mL      SPECIMEN: None      DRAINS: Hemovac      COMPLICATIONS:  None      Spinal Surgery Levels Completed:1 Level        CLINICAL NOTE:  The patient is a 74-year-old woman with progressive back and bilateral lower extremity pain.  A number of years ago she underwent L4-5 fusion and did well.  In 2022 her fusion construct was extended up to the L3-4 level.  She has generally done well but has had progressive back and lower extremity pain with walking and standing intolerance.  Studies demonstrate stenosis and evidence of instability at the L2-3 level above her prior construct.  As such, she presents at this time for additional decompression with fusion and stabilization extending to the L2-3 level.  The nature of the procedure as well as the potential risks, complications, limitations, and alternatives to the procedure were discussed at length with the patient and the patient has agreed to proceed with surgery.      TECHNICAL NOTE:  The patient was brought to the operating room and while in her cart general endotracheal anesthesia was achieved. She was then turned prone on to the Ryan table. Special care was ensured to protect pressure points. Her low back was prepared and draped in the usual fashion. The previous upper half to ¾ of her prior incision was reopened and underlying tissues were divided with electrocautery. Previous construct was exposed up to the L2-L3 level as well. The set screws were backed out at L3, L4 and L5. The old rods were removed. Pedicle screws were removed at L5 bilaterally. These defects were filled with Gelfoam. Reference frame was affixed to a spinous process. O-arm imaging pursued, and these images were downloaded into the [employer] using Stealth frameless stereotaxy. Each of the pedicle screw hole sites at L2 were marked, drilled, and tapped. 5.5 mm  diameter by 45 mm length screws were placed at L2 bilaterally. Le[place]ell rongeur was utilized to remove the spinous processes at L2 and residual of L3. Drill was utilized to fashion the central trough, which was widened. At her prior intervention, she had a laminotomy and foraminotomy on the left at L2-3 and large amounts of scar tissue were encountered at this level. Partial facetectomies were performed bilaterally. There was some disc extrusion extending onto the undersurface of the disc on the left side. There was some scar tissue as well. I am attempting to remove some of this disc material. A small ventral rent occurred in the dura. I was able to get a couple of 6-0 Prolene sutures in this, which seemed to address the leak adequately. However, given all the scar tissue and limited ability to retract on the dural sac further than the case, I elected to hold off on placing a cage from the left side. The C-arm was brought in to use. On the right side at L2-3 the disc was incised and evacuated piecemeal with an array of pituitary rongeurs, curettes and punches. Serial impactors were impacted into the disc space. Ultimately an 8 x 24 mm Adaptix cage was impacted into the disc space using fluoroscopic guidance. Prior to inserting the cage, some of the fusion substrate consisting of [place] local autograft was placed anteriorly and over to the left  within the disc space and that same fusion substrate was utilized to pack the cage that was impacted into place. Epidural bleeding was controlled with bipolar cautery and Floseal. Good decompression of the thecal sac and nerve roots were accomplished with the Valsalva maneuver. There is no evidence of CSF leak. However, I used nonsuturable DuraGen to cover the small area of dural repair on the left side. I then used Adherus sealant as well. A Hemovac drain was brought in through a separate stab incision and left in the supralaminar space. Vancomycin powder was sprinkled in  the depths and then more superficially as the wound was closed. The paraspinous muscle and fascia were reapproximated in an interrupted fashion with 0 Vicryl suture. Then 0.25% Marcaine was instilled in the paraspinous musculature and subcutaneous tissue. The subcutaneous tissue was closed in layers with 2-0 followed by 3-0 Vicryl suture. The skin was closed in a running locked fashion with a 3-0 Nylon suture. Sterile dressing was applied. She was rolled onto her cart, extubated and taken to the recovery room in satisfactory condition.             Yoni Borja M.D.      Electronically signed by Yoni Borja MD at 01/17/25 1137       Yoni Borja MD at 01/17/25 0711  Version 1 of 2         NEUROSURGICAL OPERATIVE NOTE        PREOPERATIVE DIAGNOSIS:    L2-3 stenosis and instability  Lumbar transition syndrome      POSTOPERATIVE DIAGNOSIS:  Same      PROCEDURE:  1.  Arthrodesis interbody type L2-3  2.  Bilateral L2-3 discectomy with right L2-3 Adaptix cage placement  3.  Nonsegmental pedicle screw fixation L2 to the prior construct utilizing PEEK Legacy  4.  Use of [place] and local autograft  5.  Removal of old pedicle screw hardware at L5  6.  Stealth frame stereotaxy utilized in conjunction with O arm imaging      SURGEON:  Yoni Borja M.D.      ASSISTANT: Wendy Cuello PA-C    PAC assisted with:   Suctioning   Retraction   Tying   Suturing   Closing   Application of dressing   Skilled neurosurgery PA assistance was necessary to perform this procedure.        ANESTHESIA:  General      ESTIMATED BLOOD LOSS: 150 mL      SPECIMEN: None      DRAINS: Hemovac      COMPLICATIONS:  None      Spinal Surgery Levels Completed:1 Level        CLINICAL NOTE:  The patient is a 74-year-old woman with progressive back and bilateral lower extremity pain.  A number of years ago she underwent L4-5 fusion and did well.  In 2022 her fusion construct was extended up to the L3-4 level.  She has generally done well  "but has had progressive back and lower extremity pain with walking and standing intolerance.  Studies demonstrate stenosis and evidence of instability at the L2-3 level above her prior construct.  As such, she presents at this time for additional decompression with fusion and stabilization extending to the L2-3 level.  The nature of the procedure as well as the potential risks, complications, limitations, and alternatives to the procedure were discussed at length with the patient and the patient has agreed to proceed with surgery.      TECHNICAL NOTE:   Dictation on: 01/17/2025  9:57 AM by: ANA BORJA [[id number]]           Ana Borja M.D.      Electronically signed by Ana Borja MD at 01/17/25 0957          Physician Progress Notes (all)        Ana Borja MD at 01/20/25 0601          NEUROSURGERY PROGRESS NOTE     LOS: 0 days   Patient Care Team:  Regan Delgado MD as PCP - General (Internal Medicine)  Cari Rodriguez MD as Referring Physician (Internal Medicine)  Ana Borja MD as Consulting Physician (Neurosurgery)    Chief Complaint: Low back and lower extremity pain.    POD#: 3 Days Post-Op  Procedures:  L2-3 PLIF extension.    Interval History:   Drain removed yesterday.  Patient Complaints: Soreness and cold.  Patient Denies: Headache.    Vital Signs: Blood pressure 108/64, pulse 84, temperature 98 °F (36.7 °C), temperature source Oral, resp. rate 18, height 160 cm (62.99\"), weight 83.3 kg (183 lb 10.3 oz), SpO2 94%, not currently breastfeeding.  Intake/Output:   Intake/Output Summary (Last 24 hours) at 1/20/2025 0601  Last data filed at 1/20/2025 0313  Gross per 24 hour   Intake 476 ml   Output 1850 ml   Net -1374 ml     Drain output: 300 mL.    Physical Exam:  The patient awakens easily.  She is in good spirits.  Dry dressing is in place on her incision.     Assessment/Plan:  1.  L2-3 stenosis and instability status post decompression and fusion extension from the prior " "L3-5 construct.  2.  Coronary artery disease.  3.  Fibromyalgia.  4.  Hypertension.  5.  Obesity  6.  DVT prophylaxis: Mechanical/subcu heparin.  7.  Disposition: Will slowly elevate head of bed.  Observe.      Yoni Borja MD  01/20/25  06:01 EST      Electronically signed by Yoni Borja MD at 01/20/25 0606       Rolando Gutierrez MD at 01/19/25 0917          NEUROSURGERY PROGRESS NOTE    Chief Complaint: Lumbar stenosis    Subjective: Stable overnight. Denies headache or leg pain.  Feeling well this morning.    Objective    Vital Signs: Blood pressure 122/62, pulse 77, temperature 97.6 °F (36.4 °C), temperature source Oral, resp. rate 16, height 160 cm (62.99\"), weight 83.3 kg (183 lb 10.3 oz), SpO2 96%, not currently breastfeeding.    Physical Exam  Awake, alert and oriented x 3  Opens eyes spont  Pupils 3 mm reactive bilaterally  Extraocular muscles intact bilaterally  Face symmetric bilaterally  Tongue midline  5/5 in the lower extremities bilaterally  Dressing dry    Intake/Output:   Intake/Output Summary (Last 24 hours) at 1/19/2025 0918  Last data filed at 1/19/2025 0900  Gross per 24 hour   Intake 236 ml   Output 3950 ml   Net -3714 ml       Current Medications:   Current Facility-Administered Medications:     acetaminophen (TYLENOL) tablet 650 mg, 650 mg, Oral, Q4H PRN **OR** acetaminophen (TYLENOL) 160 MG/5ML oral solution 650 mg, 650 mg, Oral, Q4H PRN **OR** acetaminophen (TYLENOL) suppository 650 mg, 650 mg, Rectal, Q4H PRN, Yoni Borja MD    amLODIPine (NORVASC) tablet 10 mg, 10 mg, Oral, Daily, Yoni Borja MD, 10 mg at 01/18/25 0842    aspirin EC tablet 81 mg, 81 mg, Oral, Daily, Yoni Borja MD, 81 mg at 01/18/25 0842    sennosides-docusate (PERICOLACE) 8.6-50 MG per tablet 2 tablet, 2 tablet, Oral, BID, 2 tablet at 01/18/25 2036 **AND** polyethylene glycol (MIRALAX) packet 17 g, 17 g, Oral, Daily PRN **AND** bisacodyl (DULCOLAX) EC tablet 5 mg, 5 mg, Oral, Daily PRN " **AND** bisacodyl (DULCOLAX) suppository 10 mg, 10 mg, Rectal, Daily PRN, Yoni Borja MD    diphenhydrAMINE (BENADRYL) capsule 25 mg, 25 mg, Oral, Nightly PRN, Yoni Borja MD    heparin (porcine) 5000 UNIT/ML injection 5,000 Units, 5,000 Units, Subcutaneous, Q8H, Yoni Borja MD, 5,000 Units at 01/19/25 0622    hydroCHLOROthiazide tablet 25 mg, 25 mg, Oral, Daily, Yoni Borja MD, 25 mg at 01/18/25 0839    HYDROcodone-acetaminophen (NORCO) 5-325 MG per tablet 1 tablet, 1 tablet, Oral, Q4H PRN, Yoni Borja MD, 1 tablet at 01/18/25 0519    latanoprost (XALATAN) 0.005 % ophthalmic solution 1 drop, 1 drop, Both Eyes, Nightly, Yoni Borja MD, 1 drop at 01/18/25 2040    lisinopril (PRINIVIL,ZESTRIL) tablet 40 mg, 40 mg, Oral, Q24H, Yoni Borja MD, 40 mg at 01/18/25 0842    metoprolol succinate XL (TOPROL-XL) 24 hr tablet 100 mg, 100 mg, Oral, Daily, Yoni Borja MD, 100 mg at 01/18/25 0842    morphine injection 2 mg, 2 mg, Intravenous, Q2H PRN, 2 mg at 01/17/25 1646 **AND** naloxone (NARCAN) injection 0.4 mg, 0.4 mg, Intravenous, Q5 Min PRN, Yoni Borja MD    ondansetron ODT (ZOFRAN-ODT) disintegrating tablet 4 mg, 4 mg, Oral, Q6H PRN **OR** ondansetron (ZOFRAN) injection 4 mg, 4 mg, Intravenous, Q6H PRN, Yoni Borja MD    oxybutynin XL (DITROPAN-XL) 24 hr tablet 10 mg, 10 mg, Oral, Daily, Yoni Borja MD, 10 mg at 01/18/25 0842    oxyCODONE-acetaminophen (PERCOCET) 7.5-325 MG per tablet 1 tablet, 1 tablet, Oral, Q4H PRN, Yoni Borja MD, 1 tablet at 01/19/25 0623    pantoprazole (PROTONIX) EC tablet 40 mg, 40 mg, Oral, Q AM, Yoni Borja MD, 40 mg at 01/19/25 0623    promethazine (PHENERGAN) tablet 12.5 mg, 12.5 mg, Oral, Q6H PRN **OR** promethazine (PHENERGAN) suppository 12.5 mg, 12.5 mg, Rectal, Q6H PRN, Yoni Borja MD    rosuvastatin (CRESTOR) tablet 40 mg, 40 mg, Oral, Nightly, Yoni Borja MD, 40 mg at 01/18/25 2036    sodium  chloride 0.9 % flush 10 mL, 10 mL, Intravenous, Q12H, Yoni Borja MD, 10 mL at 01/18/25 2056    sodium chloride 0.9 % flush 10 mL, 10 mL, Intravenous, PRN, Yoni Borja MD    sodium chloride 0.9 % infusion 40 mL, 40 mL, Intravenous, PRN, Yoni Borja MD    Facility-Administered Medications Ordered in Other Encounters:     mupirocin (BACTROBAN) 2 % nasal ointment, , Nasal, BID, Yoni Borja MD     Laboratory Results:       Lab 01/18/25  0642   HEMOGLOBIN 11.0*   HEMATOCRIT 32.7*         Lab 01/17/25  0619   POTASSIUM 3.5                         Brief Urine Lab Results       None          Microbiology Results (last 10 days)       ** No results found for the last 240 hours. **             Diagnostic Imaging: I reviewed and independently interpreted the new imaging.     Assessment/Plan:    No diagnosis found.     This is a 74-year-old female status post extension of a prior lumbar fusion.  Patient has remained neurologically stable overnight and is not having any headache.  We will continue to keep her flat and keep the drain in place.  Please call if drain output significantly increases.      Any copied data from previous notes included in the (1) History of Present Illness, (2) Physical Examination and (3) Medical Decision Making and/or Assessment and Plan has been reviewed and is accurate as of 01/19/25      Rolando Gutierrez MD  01/19/25  09:18 EST        Electronically signed by Rolando Gutierrez MD at 01/19/25 0920       Yoni Borja MD at 01/18/25 0843          NEUROSURGERY PROGRESS NOTE     LOS: 0 days   Patient Care Team:  Regan Delgado MD as PCP - General (Internal Medicine)  Cari Rodriguez MD as Referring Physician (Internal Medicine)  Yoni Borja MD as Consulting Physician (Neurosurgery)    Chief Complaint: Low back and lower extremity pain.    POD#: 1 Day Post-Op  Procedures:  L2-3 PLIF extension    Interval History:   Patient Complaints: Incisional pain.  Patient  "Denies: Preoperative severe hip and thigh pain.    Vital Signs: Blood pressure 130/64, pulse 79, temperature 98 °F (36.7 °C), temperature source Oral, resp. rate 16, height 160 cm (62.99\"), weight 83.3 kg (183 lb 10.3 oz), SpO2 99%, not currently breastfeeding.  Intake/Output:   Intake/Output Summary (Last 24 hours) at 1/18/2025 0843  Last data filed at 1/18/2025 0314  Gross per 24 hour   Intake 1700 ml   Output 2955 ml   Net -1255 ml     Drain output: 35/120 mL.    Physical Exam:  Patient is awake and alert.  She is in good spirits.  There is mild heme staining on her dressing.  Motor function is intact in her lower extremities.     Data Review:    Results from last 7 days   Lab Units 01/18/25  0642   HEMOGLOBIN g/dL 11.0*   HEMATOCRIT % 32.7*       Assessment/Plan:  1.  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  2.  Coronary artery disease.  3.  Fibromyalgia.  4.  Hypertension.  5.  Obesity  6.  DVT prophylaxis: Mechanical/subcu heparin.  7.  Disposition: Continue bedrest/out of bed flat through the weekend given small dural rent.  Continue drain until output diminishes or if output increases substantially.      Yoni Borja MD  01/18/25  08:43 EST      Electronically signed by Yoni Borja MD at 01/18/25 0853       "

## 2025-01-20 NOTE — CASE MANAGEMENT/SOCIAL WORK
Continued Stay Note  [facility]     Patient Name: Eneida Bartholomew  MRN: [id number]  Today's Date: 1/20/2025    Admit Date: 1/17/2025    Plan: Home   Discharge Plan       Row Name 01/20/25 1543       Plan    Plan Home    Plan Comments I spoke with patient in regards to discharge planning. She tells me she will have her adult grandson for assistance at discharge. She has the following DME in the home: Rolling walker, taller commode, grab bars, bath bench. She reports being independent with ADL's using no rehab services.   She has [employer] Medicare. Her PCP is Regan Delgado.  is following for discharge planning needs.    Final Discharge Disposition Code 01 - home or self-care                   Discharge Codes    No documentation.                       Sara Shi RN

## 2025-01-20 NOTE — PROGRESS NOTES
"NEUROSURGERY PROGRESS NOTE     LOS: 0 days   Patient Care Team:  Regan Delgado MD as PCP - General (Internal Medicine)  Cari Rodriguez MD as Referring Physician (Internal Medicine)  Yoni Borja MD as Consulting Physician (Neurosurgery)    Chief Complaint: Low back and lower extremity pain.    POD#: 3 Days Post-Op  Procedures:  L2-3 PLIF extension.    Interval History:   Drain removed yesterday.  Patient Complaints: Soreness and cold.  Patient Denies: Headache.    Vital Signs: Blood pressure 108/64, pulse 84, temperature 98 °F (36.7 °C), temperature source Oral, resp. rate 18, height 160 cm (62.99\"), weight 83.3 kg (183 lb 10.3 oz), SpO2 94%, not currently breastfeeding.  Intake/Output:   Intake/Output Summary (Last 24 hours) at 1/20/2025 0601  Last data filed at 1/20/2025 0313  Gross per 24 hour   Intake 476 ml   Output 1850 ml   Net -1374 ml     Drain output: 300 mL.    Physical Exam:  The patient awakens easily.  She is in good spirits.  Dry dressing is in place on her incision.     Assessment/Plan:  1.  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  2.  Coronary artery disease.  3.  Fibromyalgia.  4.  Hypertension.  5.  Obesity  6.  DVT prophylaxis: Mechanical/subcu heparin.  7.  Disposition: Will slowly elevate head of bed.  Observe.      Yoni Borja MD  01/20/25  06:01 EST    "

## 2025-01-20 NOTE — PLAN OF CARE
Problem: Adult Inpatient Plan of Care  Goal: Absence of Hospital-Acquired Illness or Injury  Outcome: Progressing  Intervention: Identify and Manage Fall Risk  Recent Flowsheet Documentation  Taken 1/19/2025 2000 by Angela Mays RN  Safety Promotion/Fall Prevention:   activity supervised   assistive device/personal items within reach   clutter free environment maintained   lighting adjusted   room organization consistent   safety round/check completed  Intervention: Prevent Skin Injury  Recent Flowsheet Documentation  Taken 1/19/2025 2000 by Angela Mays RN  Skin Protection: incontinence pads utilized  Taken 1/19/2025 1929 by Angela Mays RN  Body Position:   turned   left  Intervention: Prevent Infection  Recent Flowsheet Documentation  Taken 1/19/2025 2000 by Angela Mays RN  Infection Prevention:   environmental surveillance performed   hand hygiene promoted   rest/sleep promoted  Goal: Optimal Comfort and Wellbeing  Outcome: Progressing  Goal: Readiness for Transition of Care  Outcome: Progressing     Problem: Skin Injury Risk Increased  Goal: Skin Health and Integrity  Outcome: Progressing  Intervention: Optimize Skin Protection  Recent Flowsheet Documentation  Taken 1/19/2025 2000 by Angela Mays RN  Activity Management: bedrest  Pressure Reduction Techniques:   frequent weight shift encouraged   weight shift assistance provided  Pressure Reduction Devices: pressure-redistributing mattress utilized  Skin Protection: incontinence pads utilized  Taken 1/19/2025 1929 by Angela Mays RN  Head of Bed (HOB) Positioning: HOB flat  Taken 1/19/2025 1900 by Angela Mays RN  Activity Management: (Order for pt to lie flat until MD see's her on Monday) bedrest     Problem: Pain Acute  Goal: Optimal Pain Control and Function  Outcome: Progressing  Intervention: Prevent or Manage Pain  Recent Flowsheet Documentation  Taken 1/19/2025 2000 by Angela Mays RN  Medication Review/Management:  medications reviewed     Problem: Spinal Surgery  Goal: Optimal Coping with Surgery  Outcome: Progressing  Goal: Absence of Bleeding  Outcome: Progressing  Goal: Effective Bowel Elimination  Outcome: Progressing  Goal: Fluid and Electrolyte Balance  Outcome: Progressing  Intervention: Monitor and Manage Fluid and Electrolyte Balance  Recent Flowsheet Documentation  Taken 1/19/2025 2000 by Angela Mays RN  Fluid/Electrolyte Management: fluids provided   Goal Outcome Evaluation:

## 2025-01-21 PROCEDURE — 97162 PT EVAL MOD COMPLEX 30 MIN: CPT

## 2025-01-21 PROCEDURE — 97165 OT EVAL LOW COMPLEX 30 MIN: CPT

## 2025-01-21 PROCEDURE — 99024 POSTOP FOLLOW-UP VISIT: CPT | Performed by: NEUROLOGICAL SURGERY

## 2025-01-21 PROCEDURE — 25010000002 HEPARIN (PORCINE) PER 1000 UNITS: Performed by: NEUROLOGICAL SURGERY

## 2025-01-21 RX ADMIN — LATANOPROST 1 DROP: 50 SOLUTION OPHTHALMIC at 21:37

## 2025-01-21 RX ADMIN — ROSUVASTATIN 40 MG: 20 TABLET, FILM COATED ORAL at 20:19

## 2025-01-21 RX ADMIN — OXYCODONE HYDROCHLORIDE AND ACETAMINOPHEN 1 TABLET: 7.5; 325 TABLET ORAL at 05:20

## 2025-01-21 RX ADMIN — OXYCODONE HYDROCHLORIDE AND ACETAMINOPHEN 1 TABLET: 7.5; 325 TABLET ORAL at 13:22

## 2025-01-21 RX ADMIN — POLYETHYLENE GLYCOL 3350 17 G: 17 POWDER, FOR SOLUTION ORAL at 20:20

## 2025-01-21 RX ADMIN — PANTOPRAZOLE SODIUM 40 MG: 40 TABLET, DELAYED RELEASE ORAL at 05:20

## 2025-01-21 RX ADMIN — BISACODYL 5 MG: 5 TABLET, COATED ORAL at 10:57

## 2025-01-21 RX ADMIN — Medication 10 ML: at 13:23

## 2025-01-21 RX ADMIN — HEPARIN SODIUM 5000 UNITS: 5000 INJECTION INTRAVENOUS; SUBCUTANEOUS at 05:21

## 2025-01-21 RX ADMIN — SENNOSIDES AND DOCUSATE SODIUM 2 TABLET: 50; 8.6 TABLET ORAL at 20:19

## 2025-01-21 RX ADMIN — Medication 10 ML: at 20:20

## 2025-01-21 RX ADMIN — OXYBUTYNIN CHLORIDE 10 MG: 10 TABLET, EXTENDED RELEASE ORAL at 10:47

## 2025-01-21 RX ADMIN — ACETAMINOPHEN 650 MG: 325 TABLET ORAL at 10:56

## 2025-01-21 RX ADMIN — SENNOSIDES AND DOCUSATE SODIUM 2 TABLET: 50; 8.6 TABLET ORAL at 10:46

## 2025-01-21 RX ADMIN — LISINOPRIL 40 MG: 40 TABLET ORAL at 10:48

## 2025-01-21 RX ADMIN — HYDROCHLOROTHIAZIDE 25 MG: 25 TABLET ORAL at 10:46

## 2025-01-21 RX ADMIN — HEPARIN SODIUM 5000 UNITS: 5000 INJECTION INTRAVENOUS; SUBCUTANEOUS at 13:22

## 2025-01-21 RX ADMIN — HEPARIN SODIUM 5000 UNITS: 5000 INJECTION INTRAVENOUS; SUBCUTANEOUS at 20:20

## 2025-01-21 RX ADMIN — ASPIRIN 81 MG: 81 TABLET, COATED ORAL at 10:48

## 2025-01-21 RX ADMIN — METOPROLOL SUCCINATE 100 MG: 100 TABLET, EXTENDED RELEASE ORAL at 10:48

## 2025-01-21 NOTE — PLAN OF CARE
Goal Outcome Evaluation:  Plan of Care Reviewed With: patient        Progress: no change  Outcome Evaluation: PT initial evaluation complete. Pt presents w/ generalized weakness, decreased activity tolerance, and pain this date. Pt ambulated 100ft OOR w/ minAx1, FWW, and a close chair follow. Pt demonstrated decreased foot clearance bilaterally, slowed gait speed, and flexed trunk posture. No LOB or buckling noted throughout however mobility limited by fatigue/weakness. D/c rec is home w/ assist when medically ready for discharge.    Anticipated Discharge Disposition (PT): home with assist

## 2025-01-21 NOTE — PLAN OF CARE
Problem: Adult Inpatient Plan of Care  Goal: Absence of Hospital-Acquired Illness or Injury  Outcome: Progressing  Intervention: Identify and Manage Fall Risk  Recent Flowsheet Documentation  Taken 1/20/2025 1937 by Angela Mays RN  Safety Promotion/Fall Prevention:   activity supervised   fall prevention program maintained   room organization consistent   safety round/check completed  Intervention: Prevent Skin Injury  Recent Flowsheet Documentation  Taken 1/20/2025 1937 by Angela Mays RN  Body Position: position changed independently  Intervention: Prevent and Manage VTE (Venous Thromboembolism) Risk  Recent Flowsheet Documentation  Taken 1/20/2025 1937 by Angela Mays RN  VTE Prevention/Management: SCDs (sequential compression devices) on  Goal: Optimal Comfort and Wellbeing  Outcome: Progressing  Goal: Readiness for Transition of Care  Outcome: Progressing     Problem: Pain Acute  Goal: Optimal Pain Control and Function  Outcome: Progressing  Intervention: Optimize Psychosocial Wellbeing  Recent Flowsheet Documentation  Taken 1/20/2025 1937 by Angela Mays RN  Supportive Measures:   active listening utilized   positive reinforcement provided  Intervention: Prevent or Manage Pain  Recent Flowsheet Documentation  Taken 1/20/2025 1937 by Angela Mays RN  Medication Review/Management: medications reviewed     Problem: Spinal Surgery  Goal: Optimal Coping with Surgery  Outcome: Progressing  Intervention: Support Psychosocial Response to Surgery  Recent Flowsheet Documentation  Taken 1/20/2025 1937 by Angela Mays RN  Supportive Measures:   active listening utilized   positive reinforcement provided  Goal: Absence of Bleeding  Outcome: Progressing  Goal: Effective Bowel Elimination  Outcome: Progressing  Goal: Fluid and Electrolyte Balance  Outcome: Progressing  Intervention: Monitor and Manage Fluid and Electrolyte Balance  Recent Flowsheet Documentation  Taken 1/20/2025 1937 by Davon  Angela, RN  Fluid/Electrolyte Management: fluids provided  Goal: Optimal Functional Ability  Outcome: Progressing  Intervention: Optimize Functional Status  Recent Flowsheet Documentation  Taken 1/20/2025 1937 by Angela Mays, RN  Activity Management: bedrest  Positioning/Transfer Devices:   pillows   in use  Self-Care Promotion: independence encouraged  Goal: Absence of Infection Signs and Symptoms  Outcome: Progressing   Goal Outcome Evaluation:

## 2025-01-21 NOTE — NURSING NOTE
Patient alert and oriented. Hob at 30 degrees with no side effects. Intermittent pain well controlled with PO pain medications.

## 2025-01-21 NOTE — THERAPY EVALUATION
Patient Name: Eneida Bartholomew  : 1950    MRN: [id number]                              Today's Date: 2025       Admit Date: 2025    Visit Dx: No diagnosis found.  Patient Active Problem List   Diagnosis    Lumbar stenosis with neurogenic claudication    Coronary artery disease    Hypertension    Hyperlipidemia    Spinal stenosis, lumbar region, with neurogenic claudication    Status post lumbar spinal fusion    Lumbar postlaminectomy syndrome    Physical deconditioning    Gait disturbance    Lumbar spinal stenosis due to adjacent segment disease after fusion procedure    S/P lumbar fusion     Past Medical History:   Diagnosis Date    Arthritis     Arthritis     Bronchitis     Chicken pox     Coronary artery disease     Fibromyalgia     GERD (gastroesophageal reflux disease)     Glaucoma     Glaucoma     History of cardiac cath     History of echocardiogram     History of Holter monitoring     History of migraine     History of PTCA     History of stress test     Hyperlipidemia     Hypertension     Low back pain     Lumbosacral disc disease     Measles     Stomach ulcer     UTI (urinary tract infection)      Past Surgical History:   Procedure Laterality Date    BREAST BIOPSY Right     BUNIONECTOMY Bilateral     x4    CATARACT EXTRACTION EXTRACAPSULAR W/ INTRAOCULAR LENS IMPLANTATION Bilateral     CHOLECYSTECTOMY      CORONARY ANGIOPLASTY WITH STENT PLACEMENT      EYE SURGERY Bilateral     CATARACTS REMOVED    KNEE ARTHROSCOPY Right     LUMBAR LAMINECTOMY WITH FUSION N/A 2019    Procedure: LUMBAR FUSION DECOMPRESSON WITH PEDICLE SCREWS L4-5, LAMINECTOMY, FORAMINOTOMY RIGHT L3-4;  Surgeon: Yoni Borja MD;  Location:  [facility];  Service: Neurosurgery    LUMBAR LAMINECTOMY WITH FUSION N/A 1/3/2022    Procedure: LUMBAR FUSION extension DECOMPRESSON WITH PEDICLE SCREWS L3-4, with exploration L2-3, left, discectomy;  Surgeon: Yoni Borja MD;  Location:  [facility];  Service:  Neurosurgery;  Laterality: N/A;    LUMBAR LAMINECTOMY WITH FUSION N/A 1/17/2025    Procedure: TRANSFORAMINAL LUMBAR INTERBODY FUSION WIHT PEDICLE SCREWS L2-3, REMOVAL OF L5 HARDWARE;  Surgeon: Yoni Borja MD;  Location: [facility];  Service: Neurosurgery;  Laterality: N/A;      General Information       Row Name 01/21/25 1026          Physical Therapy Time and Intention    Document Type evaluation  -AC     Mode of Treatment physical therapy  -       Row Name 01/21/25 1026          General Information    Patient Profile Reviewed yes  -AC     Prior Level of Function independent:;all household mobility;community mobility;gait;transfer;bed mobility;ADL's  Pt reports she owns 3 rolling walkers as well as a w/c and rollator however typically does not use any DME for ambulation.  -AC     Existing Precautions/Restrictions fall;spinal  -AC     Barriers to Rehab none identified  -       Row Name 01/21/25 1026          Living Environment    People in Home alone  -       Row Name 01/21/25 1026          Home Main Entrance    Number of Stairs, Main Entrance one  -AC     Stair Railings, Main Entrance none  -       Row Name 01/21/25 1026          Stairs Within Home, Primary    Stairs, Within Home, Primary able to live on main level  -AC       Row Name 01/21/25 1026          Cognition    Orientation Status (Cognition) oriented x 4  -AC       Row Name 01/21/25 1026          Safety Issues/Impairments Affecting Functional Mobility    Safety Issues Affecting Function (Mobility) safety precautions follow-through/compliance;safety precaution awareness  -AC     Impairments Affecting Function (Mobility) balance;endurance/activity tolerance;pain;strength;range of motion (ROM)  -               User Key  (r) = Recorded By, (t) = Taken By, (c) = Cosigned By      Initials Name Provider Type    AC Neris Mai, PT Physical Therapist                   Mobility       Row Name 01/21/25 1027          Bed Mobility    Comment, (Bed  Mobility) Pt [facility]IC upon arrival  -       Row Name 01/21/25 1027          Transfers    Comment, (Transfers) VC for hand placement and sequencing  -       Row Name 01/21/25 1027          Sit-Stand Transfer    Sit-Stand [place] (Transfers) minimum assist (75% patient effort);2 person assist;verbal cues;nonverbal cues (demo/gesture)  -     Assistive Device (Sit-Stand Transfers) walker, front-wheeled  -       Row Name 01/21/25 1027          Gait/Stairs (Locomotion)    [place] Level (Gait) minimum assist (75% patient effort);1 person assist;verbal cues;nonverbal cues (demo/gesture)  -     Assistive Device (Gait) walker, front-wheeled  -     Patient was able to Ambulate yes  -AC     Distance in Feet (Gait) 100  -AC     Deviations/Abnormal Patterns (Gait) abigail decreased;gait speed decreased;stride length decreased  -AC     Bilateral Gait Deviations heel strike decreased;forward flexed posture  -     Comment, (Gait/Stairs) Pt ambulated OOR 100ft w/ minAx1, FWW, and a close chair follow. Pt demonstrated decreased foot clearance bilaterally, flexed trunk posture, heavy realiance on UE's, and slowed gait speed. No LOB or buckling noted throughout however gait distance limited by fatigue,weakness, and pain,  -               User Key  (r) = Recorded By, (t) = Taken By, (c) = Cosigned By      Initials Name Provider Type    AC Neris Mai, PT Physical Therapist                   Obj/Interventions       Row Name 01/21/25 1031          Range of Motion Comprehensive    General Range of Motion bilateral lower extremity ROM WNL  -       Row Name 01/21/25 1031          Strength Comprehensive (MMT)    General Manual Muscle Testing (MMT) Assessment lower extremity strength deficits identified  -     Comment, General Manual Muscle Testing (MMT) Assessment BLE's grossly WFL  -       Row Name 01/21/25 1031          Motor Skills    Therapeutic Exercise hip;knee;ankle  abdominal sets 10x, bent knee fall out 10x   -       Row Name 01/21/25 1031          Hip (Therapeutic Exercise)    Hip (Therapeutic Exercise) isometric exercises;strengthening exercise  -     Hip Isometrics (Therapeutic Exercise) bilateral;gluteal sets  -     Hip Strengthening (Therapeutic Exercise) bilateral;external rotation;internal rotation;10 repetitions  -       Row Name 01/21/25 1031          Knee (Therapeutic Exercise)    Knee (Therapeutic Exercise) isometric exercises  -     Knee Isometrics (Therapeutic Exercise) bilateral;quad sets;10 repetitions  -       Row Name 01/21/25 1031          Ankle (Therapeutic Exercise)    Ankle (Therapeutic Exercise) AROM (active range of motion)  -     Ankle AROM (Therapeutic Exercise) bilateral;dorsiflexion;plantarflexion;10 repetitions  -       Row Name 01/21/25 1031          Balance    Balance Assessment sitting static balance;sitting dynamic balance;standing static balance;standing dynamic balance  -     Static Sitting Balance standby assist  -     Dynamic Sitting Balance standby assist  -     Position, Sitting Balance unsupported;sitting in chair  -     Static Standing Balance contact guard  -     Dynamic Standing Balance minimal assist  -     Position/Device Used, Standing Balance supported;walker, front-wheeled  -     Balance Interventions sitting;standing;sit to stand;supported;static;dynamic  -[facility] Name 01/21/25 1031          Sensory Assessment (Somatosensory)    Sensory Assessment (Somatosensory) LE sensation intact  -               User Key  (r) = Recorded By, (t) = Taken By, (c) = Cosigned By      Initials Name Provider Type     Neris Mai, PT Physical Therapist                   Goals/Plan       Row Name 01/21/25 1046          Bed Mobility Goal 1 (PT)    Activity/Assistive Device (Bed Mobility Goal 1, PT) sit to supine/supine to sit  -     [place] Level/Cues Needed (Bed Mobility Goal 1, PT) modified independence  -     Time Frame (Bed Mobility Goal 1, PT)  long term goal (LTG);3 days  -AC       Row Name 01/21/25 1046          Transfer Goal 1 (PT)    Activity/Assistive Device (Transfer Goal 1, PT) sit-to-stand/stand-to-sit  -AC     [place] Level/Cues Needed (Transfer Goal 1, PT) modified independence  -AC     Time Frame (Transfer Goal 1, PT) long term goal (LTG);3 days  -AC       Row Name 01/21/25 1046          Gait Training Goal 1 (PT)    Activity/Assistive Device (Gait Training Goal 1, PT) gait (walking locomotion);improve balance and speed;increase endurance/gait distance;increase energy conservation;decrease fall risk  -AC     [place] Level (Gait Training Goal 1, PT) modified independence  -AC     Distance (Gait Training Goal 1, PT) 150  -AC     Time Frame (Gait Training Goal 1, PT) long term goal (LTG);3 days  -AC       Row Name 01/21/25 1046          Therapy Assessment/Plan (PT)    Planned Therapy Interventions (PT) balance training;bed mobility training;gait training;home exercise program;stair training;strengthening;transfer training;patient/family education  -               User Key  (r) = Recorded By, (t) = Taken By, (c) = Cosigned By      Initials Name Provider Type    AC Neris Mai, PT Physical Therapist                   Clinical Impression       Row Name 01/21/25 1033          Pain    Pretreatment Pain Rating 3/10  -AC     Posttreatment Pain Rating 4/10  -AC     Pain Location back  -AC     Pain Side/Orientation lower  -AC     Pain Management Interventions activity modification encouraged;exercise or physical activity utilized;positioning techniques utilized  -AC     Response to Pain Interventions activity participation with increased pain  -AC       Row Name 01/21/25 1033          Plan of Care Review    Plan of Care Reviewed With patient  -AC     Progress no change  -AC     Outcome Evaluation PT initial evaluation complete. Pt presents w/ generalized weakness, decreased activity tolerance, and pain this date. Pt ambulated 100ft OOR w/ minAx1,  FWW, and a close chair follow. Pt demonstrated decreased foot clearance bilaterally, slowed gait speed, and flexed trunk posture. No LOB or buckling noted throughout however mobility limited by fatigue/weakness. D/c rec is home w/ assist when medically ready for discharge.  -AC       Row Name 01/21/25 1033          Therapy Assessment/Plan (PT)    Rehab Potential (PT) good  -AC     Criteria for Skilled Interventions Met (PT) yes  -AC     Therapy Frequency (PT) daily  -AC     Predicted Duration of Therapy Intervention (PT) 3  -AC       Row Name 01/21/25 1033          Vital Signs    Pre Systolic BP Rehab 121  -AC     Pre Treatment Diastolic BP 74  -AC     Post Systolic BP Rehab 121  -AC     Post Treatment Diastolic BP 69  -AC     O2 Delivery Pre Treatment room air  -AC     O2 Delivery Intra Treatment room air  -AC     O2 Delivery Post Treatment room air  -AC     Pre Patient Position Sitting  -AC     Intra Patient Position Standing  -AC     Post Patient Position Sitting  -AC       Row Name 01/21/25 1033          Positioning and Restraints    Pre-Treatment Position sitting in chair/recliner  -AC     Post Treatment Position chair  -AC     In Chair notified nsg;reclined;sitting;call light within reach;encouraged to call for assist;exit alarm on;waffle cushion;legs elevated  -AC               User Key  (r) = Recorded By, (t) = Taken By, (c) = Cosigned By      Initials Name Provider Type    AC Neris Mai, PT Physical Therapist                   Outcome Measures       Row Name 01/21/25 1047          How much help from another person do you currently need...    Turning from your back to your side while in flat bed without using bedrails? 3  -AC     Moving from lying on back to sitting on the side of a flat bed without bedrails? 3  -AC     Moving to and from a bed to a chair (including a wheelchair)? 3  -AC     Standing up from a chair using your arms (e.g., wheelchair, bedside chair)? 3  -AC     Climbing 3-5 steps with a  railing? 2  -AC     To walk in hospital room? 3  -AC     AM-PAC 6 Clicks Score (PT) 17  -     Highest Level of Mobility Goal 5 --> Static standing  -       Row Name 01/21/25 1047          Functional Assessment    Outcome Measure Options AM-PAC 6 Clicks Basic Mobility (PT)  -               User Key  (r) = Recorded By, (t) = Taken By, (c) = Cosigned By      Initials Name Provider Type    AC Neris Mai, TANYA Physical Therapist                                 Physical Therapy Education       Title: PT OT SLP Therapies (In Progress)       Topic: Physical Therapy (Done)       Point: Mobility training (Done)       Learning Progress Summary            Patient Acceptance, E, VU,NR by  at 1/21/2025 1048                      Point: Home exercise program (Done)       Learning Progress Summary            Patient Acceptance, E, VU,NR by  at 1/21/2025 1048                      Point: Body mechanics (Done)       Learning Progress Summary            Patient Acceptance, E, VU,NR by  at 1/21/2025 1048                      Point: Precautions (Done)       Learning Progress Summary            Patient Acceptance, E, VU,NR by  at 1/21/2025 1048                                      User Key       Initials Effective Dates Name Provider Type Discipline     07/11/24 -  Neris Mai, PT Physical Therapist PT                  PT Recommendation and Plan  Planned Therapy Interventions (PT): balance training, bed mobility training, gait training, home exercise program, stair training, strengthening, transfer training, patient/family education  Progress: no change  Outcome Evaluation: PT initial evaluation complete. Pt presents w/ generalized weakness, decreased activity tolerance, and pain this date. Pt ambulated 100ft OOR w/ minAx1, FWW, and a close chair follow. Pt demonstrated decreased foot clearance bilaterally, slowed gait speed, and flexed trunk posture. No LOB or buckling noted throughout however mobility limited by  fatigue/weakness. D/c rec is home w/ assist when medically ready for discharge.     Time Calculation:   PT Evaluation Complexity  History, PT Evaluation Complexity: 1-2 personal factors and/or comorbidities  Examination of Body Systems (PT Eval Complexity): 1-2 elements  Clinical Presentation (PT Evaluation Complexity): stable  Clinical Decision Making (PT Evaluation Complexity): low complexity  Overall Complexity (PT Evaluation Complexity): low complexity     PT Charges       Row Name 01/21/25 1049             Time Calculation    Start Time 1006  -AC      PT Received On 01/21/25  -AC      PT Goal Re-Cert Due Date 01/31/25  -AC         Untimed Charges    PT Eval/Re-eval Minutes 52  -AC         Total Minutes    Untimed Charges Total Minutes 52  -AC       Total Minutes 52  -AC                User Key  (r) = Recorded By, (t) = Taken By, (c) = Cosigned By      Initials Name Provider Type    AC Neris Mai, PT Physical Therapist                  Therapy Charges for Today       Code Description Service Date Service Provider Modifiers Qty    80607361736 HC PT EVAL MOD COMPLEXITY 4 1/21/2025 Neris Mai, PT GP 1            PT G-Codes  Outcome Measure Options: AM-PAC 6 Clicks Basic Mobility (PT)  AM-PAC 6 Clicks Score (PT): 17  PT Discharge Summary  Anticipated Discharge Disposition (PT): home with assist    Neris Mai PT  1/21/2025

## 2025-01-21 NOTE — PLAN OF CARE
Goal Outcome Evaluation:  Plan of Care Reviewed With: patient        Progress: no change  Outcome Evaluation: OT eval complete. Pt educated on spinal precautions and implemented/educated on use of AE to maintain precautions with ADL performance. Pt presents below fxl baseline with ADLs and mobility, limited by pain, spinal precautions, and poor activity tolerance. Pt would benefit from ongoing skilled OT services to progress to PLOF. Rec d/c to home with assist.    Anticipated Discharge Disposition (OT): home with assist

## 2025-01-21 NOTE — THERAPY EVALUATION
Patient Name: Eneida Bartholomew  : 1950    MRN: [id number]                              Today's Date: 2025       Admit Date: 2025    Visit Dx: No diagnosis found.  Patient Active Problem List   Diagnosis    Lumbar stenosis with neurogenic claudication    Coronary artery disease    Hypertension    Hyperlipidemia    Spinal stenosis, lumbar region, with neurogenic claudication    Status post lumbar spinal fusion    Lumbar postlaminectomy syndrome    Physical deconditioning    Gait disturbance    Lumbar spinal stenosis due to adjacent segment disease after fusion procedure    S/P lumbar fusion     Past Medical History:   Diagnosis Date    Arthritis     Arthritis     Bronchitis     Chicken pox     Coronary artery disease     Fibromyalgia     GERD (gastroesophageal reflux disease)     Glaucoma     Glaucoma     History of cardiac cath     History of echocardiogram     History of Holter monitoring     History of migraine     History of PTCA     History of stress test     Hyperlipidemia     Hypertension     Low back pain     Lumbosacral disc disease     Measles     Stomach ulcer     UTI (urinary tract infection)      Past Surgical History:   Procedure Laterality Date    BREAST BIOPSY Right     BUNIONECTOMY Bilateral     x4    CATARACT EXTRACTION EXTRACAPSULAR W/ INTRAOCULAR LENS IMPLANTATION Bilateral     CHOLECYSTECTOMY      CORONARY ANGIOPLASTY WITH STENT PLACEMENT      EYE SURGERY Bilateral     CATARACTS REMOVED    KNEE ARTHROSCOPY Right     LUMBAR LAMINECTOMY WITH FUSION N/A 2019    Procedure: LUMBAR FUSION DECOMPRESSON WITH PEDICLE SCREWS L4-5, LAMINECTOMY, FORAMINOTOMY RIGHT L3-4;  Surgeon: Yoni Borja MD;  Location:  [facility];  Service: Neurosurgery    LUMBAR LAMINECTOMY WITH FUSION N/A 1/3/2022    Procedure: LUMBAR FUSION extension DECOMPRESSON WITH PEDICLE SCREWS L3-4, with exploration L2-3, left, discectomy;  Surgeon: Yoni Borja MD;  Location:  [facility];  Service:  Neurosurgery;  Laterality: N/A;    LUMBAR LAMINECTOMY WITH FUSION N/A 1/17/2025    Procedure: TRANSFORAMINAL LUMBAR INTERBODY FUSION WIHT PEDICLE SCREWS L2-3, REMOVAL OF L5 HARDWARE;  Surgeon: Yoni Borja MD;  Location: [facility];  Service: Neurosurgery;  Laterality: N/A;      General Information       Row Name 01/21/25 1341          OT Time and Intention    Document Type evaluation  -AJ     Mode of Treatment occupational therapy  -AJ     Patient Effort excellent  -       Row Name 01/21/25 1341          General Information    Patient Profile Reviewed yes  -AJ     Prior Level of Function independent:;all household mobility;community mobility;bed mobility;ADL's  Pt has a tub shower with chair and grab bars, elevated toileted seat with sink nearby for support. Ind with ADLs.  -AJ     Existing Precautions/Restrictions fall;spinal  -AJ     Barriers to Rehab none identified  -       Row Name 01/21/25 1341          Living Environment    People in Home alone  -       Row Name 01/21/25 1341          Home Main Entrance    Number of Stairs, Main Entrance one  -AJ     Stair Railings, Main Entrance none  -       Row Name 01/21/25 1341          Stairs Within Home, Primary    Stairs, Within Home, Primary All needs met on first floor  -AJ       Row Name 01/21/25 1341          Cognition    Orientation Status (Cognition) oriented x 4  -       Row Name 01/21/25 1341          Safety Issues/Impairments Affecting Functional Mobility    Safety Issues Affecting Function (Mobility) safety precaution awareness;safety precautions follow-through/compliance  -AJ     Impairments Affecting Function (Mobility) balance;endurance/activity tolerance;range of motion (ROM);pain;strength  -AJ               User Key  (r) = Recorded By, (t) = Taken By, (c) = Cosigned By      Initials Name Provider Type    AJ Cathy Foster OT Occupational Therapist                     Mobility/ADL's       Row Name 01/21/25 1345          Transfers     Transfers sit-stand transfer;stand-sit transfer  -     Comment, (Transfers) [facility] upon OT arrival  -       Row Name 01/21/25 1345          Sit-Stand Transfer    Sit-Stand [place] (Transfers) minimum assist (75% patient effort);2 person assist;verbal cues  -     Assistive Device (Sit-Stand Transfers) walker, front-wheeled  -     Comment, (Sit-Stand Transfer) Cues for HP with walker use  -       Row Name 01/21/25 1345          Functional Mobility    Functional Mobility- Comment Defer to PT for ambulation details  -[facility] Name 01/21/25 1345          Activities of Daily Living    BADL Assessment/Intervention bathing;lower body dressing;toileting  -[facility] Name 01/21/25 1345          Bathing Assessment/Intervention    [place] Level (Bathing) bathing skills  -     Comment, (Bathing) Pt implemented and educated on use of LH sponge for increased ind with bathing while maintaining spinal precautions.  -[facility] Name 01/21/25 1345          Lower Body Dressing Assessment/Training    Comment, (Lower Body Dressing) Pt implemented and educated on use of reacher, sock aid, and shoe horn for increased ind with LB dressing while maintaining spinal precautions.  -[facility] Name 01/21/25 1345          Toileting Assessment/Training    Comment, (Toileting) Pt implemented and educated on use of toilet tongs for increased ind with toileting while maintaining spinal precautions.  -               User Key  (r) = Recorded By, (t) = Taken By, (c) = Cosigned By      Initials Name Provider Type    Cathy Jimenes OT Occupational Therapist                   Obj/Interventions       Row Name 01/21/25 1348          Sensory Assessment (Somatosensory)    Sensory Assessment (Somatosensory) UE sensation intact  -[facility] Name 01/21/25 1348          Vision Assessment/Intervention    Visual Impairment/Limitations WFL  -[facility] Name 01/21/25 1348          Range of Motion Comprehensive    General Range  of Motion bilateral upper extremity ROM WFL  -       Row Name 01/21/25 1348          Strength Comprehensive (MMT)    General Manual Muscle Testing (MMT) Assessment upper extremity strength deficits identified  -     Comment, General Manual Muscle Testing (MMT) Assessment Limited ability for UE MMT d/t incisional pain with testing. Grossly 4/5 based on fxl transfers.  -       Row Name 01/21/25 1348          Balance    Balance Assessment sitting static balance;sitting dynamic balance;sit to stand dynamic balance;standing dynamic balance;standing static balance  -     Static Sitting Balance standby assist  -     Dynamic Sitting Balance standby assist  -AJ     Position, Sitting Balance unsupported;sitting in chair  -     Sit to Stand Dynamic Balance minimal assist;2-person assist;verbal cues  -     Static Standing Balance contact guard  -     Dynamic Standing Balance minimal assist  -     Position/Device Used, Standing Balance supported;walker, front-wheeled  -     Balance Interventions sitting;standing;sit to stand;supported;static;dynamic;occupation based/functional task  -               User Key  (r) = Recorded By, (t) = Taken By, (c) = Cosigned By      Initials Name Provider Type    AJ Cathy Foster OT Occupational Therapist                   Goals/Plan       Row Name 01/21/25 9327          Transfer Goal 1 (OT)    Activity/Assistive Device (Transfer Goal 1, OT) sit-to-stand/stand-to-sit;bed-to-chair/chair-to-bed;toilet  -     [place] Level/Cues Needed (Transfer Goal 1, OT) standby assist  -     Time Frame (Transfer Goal 1, OT) long term goal (LTG);10 days  -     Progress/Outcome (Transfer Goal 1, OT) new goal  -       Row Name 01/21/25 1354          Bathing Goal 1 (OT)    Activity/Device (Bathing Goal 1, OT) lower body bathing  -     [place] Level/Cues Needed (Bathing Goal 1, OT) minimum assist (75% or more patient effort)  -     Time Frame (Bathing Goal 1, OT) short  term goal (STG);5 days  -AJ     Progress/Outcomes (Bathing Goal 1, OT) new goal  -AJ       Row Name 01/21/25 1353          Dressing Goal 1 (OT)    Activity/Device (Dressing Goal 1, OT) lower body dressing  -AJ     [place]/Cues Needed (Dressing Goal 1, OT) set-up required  -AJ     Time Frame (Dressing Goal 1, OT) long term goal (LTG);10 days  -AJ     Progress/Outcome (Dressing Goal 1, OT) new goal  -AJ       Row Name 01/21/25 1353          Toileting Goal 1 (OT)    Activity/Device (Toileting Goal 1, OT) adjust/manage clothing;perform perineal hygiene  -AJ     [place] Level/Cues Needed (Toileting Goal 1, OT) minimum assist (75% or more patient effort)  -AJ     Time Frame (Toileting Goal 1, OT) short term goal (STG);5 days  -AJ     Progress/Outcome (Toileting Goal 1, OT) new goal  -       Row Name 01/21/25 1352          Therapy Assessment/Plan (OT)    Planned Therapy Interventions (OT) activity tolerance training;adaptive equipment training;BADL retraining;functional balance retraining;IADL retraining;occupation/activity based interventions;patient/caregiver education/training;ROM/therapeutic exercise;strengthening exercise;transfer/mobility retraining  -AJ               User Key  (r) = Recorded By, (t) = Taken By, (c) = Cosigned By      Initials Name Provider Type    Cathy Jimenes, ERIN Occupational Therapist                   Clinical Impression       Row Name 01/21/25 135          Pain Assessment    Pretreatment Pain Rating 1/10  -AJ     Posttreatment Pain Rating 4/10  -AJ     Pain Location back  -AJ     Pain Side/Orientation lower  -AJ     Pain Management Interventions activity modification encouraged;exercise or physical activity utilized;positioning techniques utilized  -AJ     Response to Pain Interventions activity participation with increased pain  -AJ       Row Name 01/21/25 135          Plan of Care Review    Plan of Care Reviewed With patient  -AJ     Progress no change  -AJ     Outcome  Evaluation OT eval complete. Pt educated on spinal precautions and implemented/educated on use of AE to maintain precautions with ADL performance. Pt presents below fxl baseline with ADLs and mobility, limited by pain, spinal precautions, and poor activity tolerance. Pt would benefit from ongoing skilled OT services to progress to PLOF. Rec d/c to home with assist.  -       Row Name 01/21/25 1350          Therapy Assessment/Plan (OT)    Patient/Family Therapy Goal Statement (OT) Return to PLOF  -     Rehab Potential (OT) good  -     Criteria for Skilled Therapeutic Interventions Met (OT) yes;meets criteria;skilled treatment is necessary  -     Therapy Frequency (OT) daily  -     Predicted Duration of Therapy Intervention (OT) 10 days  -       Row Name 01/21/25 1350          Therapy Plan Review/Discharge Plan (OT)    Anticipated Discharge Disposition (OT) home with assist  -       Row Name 01/21/25 1350          Vital Signs    Pre Systolic BP Rehab 121  -AJ     Pre Treatment Diastolic BP 74  -AJ     Post Systolic BP Rehab 121  -AJ     Post Treatment Diastolic BP 69  -AJ     Pre SpO2 (%) 91  -AJ     O2 Delivery Pre Treatment room air  -AJ     O2 Delivery Intra Treatment room air  -AJ     Post SpO2 (%) 94  -AJ     O2 Delivery Post Treatment room air  -AJ     Pre Patient Position Sitting  -     Post Patient Position Sitting  -       Row Name 01/21/25 1350          Positioning and Restraints    Pre-Treatment Position sitting in chair/recliner  -AJ     Post Treatment Position chair  -AJ     In Chair notified nsg;reclined;sitting;call light within reach;encouraged to call for assist;exit alarm on;legs elevated;waffle cushion  -               User Key  (r) = Recorded By, (t) = Taken By, (c) = Cosigned By      Initials Name Provider Type    Cathy Jimenes, OT Occupational Therapist                   Outcome Measures       Row Name 01/21/25 9587          How much help from another is currently  needed...    Putting on and taking off regular lower body clothing? 2  -AJ     Bathing (including washing, rinsing, and drying) 2  -AJ     Toileting (which includes using toilet bed pan or urinal) 2  -AJ     Putting on and taking off regular upper body clothing 3  -AJ     Taking care of personal grooming (such as brushing teeth) 4  -AJ     Eating meals 4  -AJ     AM-PAC 6 Clicks Score (OT) 17  -AJ       Row Name 01/21/25 1047          How much help from another person do you currently need...    Turning from your back to your side while in flat bed without using bedrails? 3  -AC     Moving from lying on back to sitting on the side of a flat bed without bedrails? 3  -AC     Moving to and from a bed to a chair (including a wheelchair)? 3  -AC     Standing up from a chair using your arms (e.g., wheelchair, bedside chair)? 3  -AC     Climbing 3-5 steps with a railing? 2  -AC     To walk in hospital room? 3  -AC     AM-PAC 6 Clicks Score (PT) 17  -AC     Highest Level of Mobility Goal 5 --> Static standing  -AC       Row Name 01/21/25 1354 01/21/25 1047       Functional Assessment    Outcome Measure Options AM-PAC 6 Clicks Daily Activity (OT)  -AJ AM-PAC 6 Clicks Basic Mobility (PT)  -AC              User Key  (r) = Recorded By, (t) = Taken By, (c) = Cosigned By      Initials Name Provider Type    AC Neris Mai, PT Physical Therapist    Cathy Jimenes, OT Occupational Therapist                    Occupational Therapy Education       Title: PT OT SLP Therapies (In Progress)       Topic: Occupational Therapy (In Progress)       Point: ADL training (Done)       Description:   Instruct learner(s) on proper safety adaptation and remediation techniques during self care or transfers.   Instruct in proper use of assistive devices.                  Learning Progress Summary            Patient Acceptance, E,D, VU,DU by MARYAM at 1/21/2025 1354                      Point: Home exercise program (Not Started)       Description:    Instruct learner(s) on appropriate technique for monitoring, assisting and/or progressing therapeutic exercises/activities.                  Learner Progress:  Not documented in this visit.              Point: Precautions (Done)       Description:   Instruct learner(s) on prescribed precautions during self-care and functional transfers.                  Learning Progress Summary            Patient Acceptance, E,D, XENA,DU by MARYAM at 1/21/2025 1354                      Point: Body mechanics (Done)       Description:   Instruct learner(s) on proper positioning and spine alignment during self-care, functional mobility activities and/or exercises.                  Learning Progress Summary            Patient Acceptance, E,D, XENA,CASS by MARYAM at 1/21/2025 1354                                      User Key       Initials Effective Dates Name Provider Type Discipline     08/26/24 -  Cathy Foster, OT Occupational Therapist OT                  OT Recommendation and Plan  Planned Therapy Interventions (OT): activity tolerance training, adaptive equipment training, BADL retraining, functional balance retraining, IADL retraining, occupation/activity based interventions, patient/caregiver education/training, ROM/therapeutic exercise, strengthening exercise, transfer/mobility retraining  Therapy Frequency (OT): daily  Plan of Care Review  Plan of Care Reviewed With: patient  Progress: no change  Outcome Evaluation: OT eval complete. Pt educated on spinal precautions and implemented/educated on use of AE to maintain precautions with ADL performance. Pt presents below fxl baseline with ADLs and mobility, limited by pain, spinal precautions, and poor activity tolerance. Pt would benefit from ongoing skilled OT services to progress to PLOF. Rec d/c to home with assist.     Time Calculation:   Evaluation Complexity (OT)  Review Occupational Profile/Medical/Therapy History Complexity: brief/low complexity  Assessment, Occupational  Performance/Identification of Deficit Complexity: 1-3 performance deficits  Clinical Decision Making Complexity (OT): problem focused assessment/low complexity  Overall Complexity of Evaluation (OT): low complexity     Time Calculation- OT       Row Name 01/21/25 1356             Time Calculation- OT    OT Start Time 0941  -AJ      OT Received On 01/21/25  -AJ      OT Goal Re-Cert Due Date 01/31/25  -AJ         Untimed Charges    OT Eval/Re-eval Minutes 50  -AJ         Total Minutes    Untimed Charges Total Minutes 50  -AJ       Total Minutes 50  -AJ                User Key  (r) = Recorded By, (t) = Taken By, (c) = Cosigned By      Initials Name Provider Type    AJ Cathy Foster OT Occupational Therapist                  Therapy Charges for Today       Code Description Service Date Service Provider Modifiers Qty    78402535609 HC OT EVAL LOW COMPLEXITY 4 1/21/2025 Cathy Foster OT GO 1                 Cathy Foster OT  1/21/2025

## 2025-01-21 NOTE — PROGRESS NOTES
"NEUROSURGERY PROGRESS NOTE     LOS: 1 day   Patient Care Team:  Regan Delgado MD as PCP - General (Internal Medicine)  Cari Rodriguez MD as Referring Physician (Internal Medicine)  Yoni Borja MD as Consulting Physician (Neurosurgery)    Chief Complaint: Low back and lower extremity pain.    POD#: 4 Days Post-Op  Procedures:  L2-3 PLIF extension.    Interval History:   Patient Complaints: Mild incisional pain.  Patient Denies: Headache.    Vital Signs: Blood pressure 103/58, pulse 72, temperature 97.4 °F (36.3 °C), temperature source Oral, resp. rate 18, height 160 cm (62.99\"), weight 83.3 kg (183 lb 10.3 oz), SpO2 91%, not currently breastfeeding.  Intake/Output:   Intake/Output Summary (Last 24 hours) at 1/21/2025 0612  Last data filed at 1/20/2025 1924  Gross per 24 hour   Intake 358 ml   Output 800 ml   Net -442 ml       Physical Exam:  The patient looks much more comfortable today.  She moves about in bed very easily.  Dry dressing is in place on her incision.    Assessment/Plan:  1.  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  2.  Coronary artery disease.  3.  Fibromyalgia.  4.  Hypertension.  5.  Obesity  6.  DVT prophylaxis: Mechanical/subcu heparin.  7.  Disposition: Mobilize patient.  Hopefully home later today or tomorrow.      Yoni Borja MD  01/21/25  06:12 EST    "

## 2025-01-22 VITALS
HEIGHT: 63 IN | OXYGEN SATURATION: 100 % | SYSTOLIC BLOOD PRESSURE: 128 MMHG | TEMPERATURE: 97.5 F | HEART RATE: 101 BPM | DIASTOLIC BLOOD PRESSURE: 79 MMHG | WEIGHT: 183.64 LBS | RESPIRATION RATE: 16 BRPM | BODY MASS INDEX: 32.54 KG/M2

## 2025-01-22 PROCEDURE — 99024 POSTOP FOLLOW-UP VISIT: CPT | Performed by: NEUROLOGICAL SURGERY

## 2025-01-22 PROCEDURE — 97116 GAIT TRAINING THERAPY: CPT

## 2025-01-22 PROCEDURE — 99024 POSTOP FOLLOW-UP VISIT: CPT | Performed by: PHYSICIAN ASSISTANT

## 2025-01-22 PROCEDURE — 97110 THERAPEUTIC EXERCISES: CPT

## 2025-01-22 PROCEDURE — 25010000002 HEPARIN (PORCINE) PER 1000 UNITS: Performed by: NEUROLOGICAL SURGERY

## 2025-01-22 RX ORDER — OXYCODONE AND ACETAMINOPHEN 5; 325 MG/1; MG/1
1 TABLET ORAL 3 TIMES DAILY PRN
Qty: 15 TABLET | Refills: 0 | Status: SHIPPED | OUTPATIENT
Start: 2025-01-22

## 2025-01-22 RX ADMIN — PANTOPRAZOLE SODIUM 40 MG: 40 TABLET, DELAYED RELEASE ORAL at 05:13

## 2025-01-22 RX ADMIN — ASPIRIN 81 MG: 81 TABLET, COATED ORAL at 09:56

## 2025-01-22 RX ADMIN — ACETAMINOPHEN 650 MG: 325 TABLET ORAL at 11:17

## 2025-01-22 RX ADMIN — OXYBUTYNIN CHLORIDE 10 MG: 10 TABLET, EXTENDED RELEASE ORAL at 09:55

## 2025-01-22 RX ADMIN — HEPARIN SODIUM 5000 UNITS: 5000 INJECTION INTRAVENOUS; SUBCUTANEOUS at 05:12

## 2025-01-22 NOTE — PROGRESS NOTES
"NEUROSURGERY PROGRESS NOTE     LOS: 2 days   Patient Care Team:  Regan Delgado MD as PCP - General (Internal Medicine)  Cari Rodriguez MD as Referring Physician (Internal Medicine)  Yoni Borja MD as Consulting Physician (Neurosurgery)    Chief Complaint: Low back and lower extremity pain.    POD#: 5 Days Post-Op  Procedures:  L2-3 PLIF extension.    Interval History:   Patient Complaints: Mild incisional pain.  Patient Denies: Headache or preoperative hip/thigh pain.    Vital Signs: Blood pressure 124/64, pulse 83, temperature 97.4 °F (36.3 °C), temperature source Oral, resp. rate 16, height 160 cm (62.99\"), weight 83.3 kg (183 lb 10.3 oz), SpO2 93%, not currently breastfeeding.  Intake/Output:   Intake/Output Summary (Last 24 hours) at 1/22/2025 0617  Last data filed at 1/21/2025 1700  Gross per 24 hour   Intake 1087 ml   Output --   Net 1087 ml       Physical Exam:  The patient is alert and in good spirits.  Dry dressing is in place on her incision.     Assessment/Plan:  1.  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  2.  Coronary artery disease.  3.  Fibromyalgia.  4.  Hypertension.  5.  Obesity  6.  DVT prophylaxis: Mechanical/subcu heparin.  7.  Disposition: Home today.  Follow-up with [facility] in my office in 8-10 days for wound check and suture removal.      Yoni Borja MD  01/22/25  06:17 EST    "

## 2025-01-22 NOTE — CASE MANAGEMENT/SOCIAL WORK
Continued Stay Note  [facility]     Patient Name: Eneida Bartholomew  MRN: [id number]  Today's Date: 1/22/2025    Admit Date: 1/17/2025    Plan: Home   Discharge Plan       Row Name 01/22/25 1400       Plan    Plan Home    Plan Comments I spoke with patient and her plans are for home with no case management needs. IMM given today@ 1300    Final Discharge Disposition Code 01 - home or self-care                   Discharge Codes    No documentation.                 Expected Discharge Date and Time       Expected Discharge Date Expected Discharge Time    Jan 22, 2025               Sara Shi RN

## 2025-01-22 NOTE — DISCHARGE INSTRUCTIONS
[facility] COLD THERAPY - PATIENT INSTRUCTION SHEET    Cold Compression Therapy for your comfort and rehabilitation  Your caregivers want you to be productive in your rehab and comfortable during your stay. In keeping with those goals, you will be receiving an SMI Cold Therapy Wrap to help ease post-operative pain and swelling that might keep you from getting back on track! Your SMI Cold Therapy Wrap is effective and simple-to-use, and you will be encouraged to apply it throughout your hospital stay and at home through the duration of your recovery.    When you are ready to go home  Be sure to take your SMI Cold Therapy Wrap and both sets of Gel Bags with you for continued comfort and use throughout your rehabilitation. If you don't already have them, ask your nurse or aide to retrieve your SMI Gel Bags from the patient freezer.    Home use precautions  Always follow your medical professional's application instructions upon discharge. Your SMI Cold Therapy Wrap and Gel Bags are designed to last for months following your surgery. Never heat the Gel Bags unless specified by your healthcare provider. Supervision is advised when using this product on children or geriatric patients. To avoid danger of suffocation, please keep the outer plastic packaging away from children & pets.    Cold Therapy Instructions  Place Gel Bags in a freezer set ¾ of the way to max temperature for at least (4) hours. For best results, lay the Gel Bags flat and [id number] in the freezer. Once frozen, slide Gel Bags into the gel pouch and secure your wrap to the affected area with the straps.  Gel wraps that have been stored in a freezer for an extended period of time may require a (10) minute period of softening up in a room temperature environment before application.  The gel pouch acts as a protective barrier. NEVER place frozen bags directly onto skin, as this may cause frostbite injury.  The SMI Cold Therapy Wrap is designed to be able to be  worm while ambulating. The compression straps can be secured well enough so that the Wrap won't fall off while moving.  Wrap Application Videos can be viewed at [email or web address].  An additional protective barrier such as clothing, a washcloth, hand-towel or pillowcase may be used during prolonged treatment applications.  The Gel-Pouch and Wrap are both Latex-Free and the Gel Bag ingredients are non toxic.    [facility] Wrap care instructions  The [facility] Cold Therapy Wrap may be hand washed and hung to dry when needed.    [facility] re-order information  Additional [facility] body specific wraps and/or Gel Bags can be re-ordered from [email or web address] or call [email or web address] ([phone])

## 2025-01-22 NOTE — PLAN OF CARE
Goal Outcome Evaluation:                   Pt is alert and oriented x 4; Discharge instructions have been gone over with patient. Patient was a pleasure to take care of.

## 2025-01-22 NOTE — PLAN OF CARE
Problem: Adult Inpatient Plan of Care  Goal: Plan of Care Review  Outcome: Progressing  Goal: Patient-Specific Goal (Individualized)  Outcome: Progressing  Goal: Absence of Hospital-Acquired Illness or Injury  Outcome: Progressing  Intervention: Identify and Manage Fall Risk  Recent Flowsheet Documentation  Taken 1/22/2025 0400 by Omid Arevalo, RN  Safety Promotion/Fall Prevention:   nonskid shoes/slippers when out of bed   safety round/check completed   assistive device/personal items within reach   clutter free environment maintained   fall prevention program maintained   lighting adjusted   room organization consistent  Taken 1/22/2025 0200 by Omid Arevalo, RN  Safety Promotion/Fall Prevention:   activity supervised   assistive device/personal items within reach   clutter free environment maintained   fall prevention program maintained   lighting adjusted   nonskid shoes/slippers when out of bed   room organization consistent   safety round/check completed  Taken 1/22/2025 0000 by Omid Arevalo, RN  Safety Promotion/Fall Prevention:   nonskid shoes/slippers when out of bed   safety round/check completed   assistive device/personal items within reach   clutter free environment maintained   fall prevention program maintained   lighting adjusted   room organization consistent  Taken 1/21/2025 2200 by Omid Arevalo, RN  Safety Promotion/Fall Prevention:   nonskid shoes/slippers when out of bed   safety round/check completed   assistive device/personal items within reach   clutter free environment maintained   fall prevention program maintained   lighting adjusted   room organization consistent  Taken 1/21/2025 2000 by Omid Arevalo, RN  Safety Promotion/Fall Prevention:   activity supervised   assistive device/personal items within reach   clutter free environment maintained   fall prevention program maintained   lighting adjusted   nonskid shoes/slippers when out of bed   room organization  consistent   safety round/check completed  Intervention: Prevent Skin Injury  Recent Flowsheet Documentation  Taken 1/22/2025 0400 by Omid Arevalo RN  Body Position: position changed independently  Skin Protection: incontinence pads utilized  Taken 1/22/2025 0200 by Omid Arevalo RN  Body Position: position changed independently  Skin Protection: incontinence pads utilized  Taken 1/22/2025 0000 by Omid Arevalo RN  Body Position: position changed independently  Skin Protection: incontinence pads utilized  Taken 1/21/2025 2200 by Omid Arevalo RN  Body Position: position changed independently  Skin Protection: incontinence pads utilized  Taken 1/21/2025 2000 by Omid Arevalo RN  Body Position:   legs elevated   position changed independently   supine  Skin Protection: incontinence pads utilized  Intervention: Prevent and Manage VTE (Venous Thromboembolism) Risk  Recent Flowsheet Documentation  Taken 1/21/2025 2000 by Omid Arevalo RN  VTE Prevention/Management:   bilateral   SCDs (sequential compression devices) on  Intervention: Prevent Infection  Recent Flowsheet Documentation  Taken 1/22/2025 0400 by Omid Arevalo RN  Infection Prevention:   environmental surveillance performed   rest/sleep promoted   single patient room provided  Taken 1/22/2025 0200 by Omid Arevalo RN  Infection Prevention:   environmental surveillance performed   rest/sleep promoted   single patient room provided  Taken 1/22/2025 0000 by Omid Arevalo RN  Infection Prevention:   environmental surveillance performed   rest/sleep promoted   single patient room provided  Taken 1/21/2025 2200 by Omid Arevalo RN  Infection Prevention:   environmental surveillance performed   rest/sleep promoted   single patient room provided  Taken 1/21/2025 2000 by Omid Arevalo RN  Infection Prevention:   rest/sleep promoted   single patient room provided   hand hygiene promoted   environmental surveillance  performed  Goal: Optimal Comfort and Wellbeing  Outcome: Progressing  Intervention: Provide Person-Centered Care  Recent Flowsheet Documentation  Taken 1/22/2025 0400 by Omid Arevalo RN  Trust Relationship/Rapport:   care explained   choices provided   questions answered   questions encouraged   thoughts/feelings acknowledged  Taken 1/22/2025 0200 by Omid Arevalo RN  Trust Relationship/Rapport:   care explained   choices provided   questions answered   questions encouraged   thoughts/feelings acknowledged  Taken 1/22/2025 0000 by Omid Arevalo RN  Trust Relationship/Rapport:   care explained   choices provided   questions answered   questions encouraged   thoughts/feelings acknowledged  Taken 1/21/2025 2200 by Omid Arevaol RN  Trust Relationship/Rapport:   care explained   choices provided   questions answered   questions encouraged   thoughts/feelings acknowledged  Taken 1/21/2025 2000 by Omid Arevalo RN  Trust Relationship/Rapport:   care explained   choices provided   questions answered   questions encouraged   thoughts/feelings acknowledged  Goal: Readiness for Transition of Care  Outcome: Progressing     Problem: Skin Injury Risk Increased  Goal: Skin Health and Integrity  Outcome: Progressing  Intervention: Optimize Skin Protection  Recent Flowsheet Documentation  Taken 1/22/2025 0400 by Omid Arevalo RN  Activity Management: activity encouraged  Pressure Reduction Techniques:   frequent weight shift encouraged   weight shift assistance provided  Head of Bed (HOB) Positioning: HOB elevated  Pressure Reduction Devices:   positioning supports utilized   pressure-redistributing mattress utilized  Skin Protection: incontinence pads utilized  Taken 1/22/2025 0200 by Omid Arevalo RN  Activity Management: activity encouraged  Pressure Reduction Techniques:   frequent weight shift encouraged   weight shift assistance provided  Head of Bed (HOB) Positioning: HOB  elevated  Pressure Reduction Devices:   pressure-redistributing mattress utilized   positioning supports utilized  Skin Protection: incontinence pads utilized  Taken 1/22/2025 0000 by Omid Arevalo RN  Activity Management: activity encouraged  Pressure Reduction Techniques:   frequent weight shift encouraged   weight shift assistance provided  Head of Bed (HOB) Positioning: [facility] [place] elevated  Pressure Reduction Devices: pressure-redistributing mattress utilized  Skin Protection: incontinence pads utilized  Taken 1/21/2025 2200 by Omid Arevalo RN  Activity Management: activity encouraged  Pressure Reduction Techniques:   weight shift assistance provided   frequent weight shift encouraged  Head of Bed (HOB) Positioning: [facility] [place] elevated  Pressure Reduction Devices: pressure-redistributing mattress utilized  Skin Protection: incontinence pads utilized  Taken 1/21/2025 2000 by Omid Arevalo RN  Activity Management: activity encouraged  Pressure Reduction Techniques:   frequent weight shift encouraged   weight shift assistance provided  Head of Bed (HOB) Positioning: [facility] [place] elevated  Pressure Reduction Devices: pressure-redistributing mattress utilized  Skin Protection: incontinence pads utilized     Problem: Pain Acute  Goal: Optimal Pain Control and Function  Outcome: Progressing  Intervention: Optimize Psychosocial Wellbeing  Recent Flowsheet Documentation  Taken 1/22/2025 0400 by Omid Arevalo RN  Diversional Activities:   television   smartphone  Taken 1/22/2025 0200 by Omid Arevalo RN  Diversional Activities:   television   smartphone  Taken 1/22/2025 0000 by Omid Arevalo RN  Diversional Activities:   smartphone   television  Taken 1/21/2025 2200 by Omid Arevalo RN  Diversional Activities:   television   smartphone  Taken 1/21/2025 2000 by Omid Arevalo RN  Diversional Activities:   television   smartphone  Intervention: Prevent or Manage Pain  Recent Flowsheet Documentation  Taken  1/22/2025 0400 by Omid Arevalo RN  Medication Review/Management: medications reviewed  Taken 1/22/2025 0200 by Omid Arevalo RN  Medication Review/Management: medications reviewed  Taken 1/22/2025 0000 by Omid Arevalo RN  Medication Review/Management: medications reviewed  Taken 1/21/2025 2200 by Omid Arevalo RN  Medication Review/Management: medications reviewed  Taken 1/21/2025 2000 by Omid Arevalo RN  Medication Review/Management: medications reviewed     Problem: Spinal Surgery  Goal: Optimal Coping with Surgery  Outcome: Progressing  Intervention: Support Psychosocial Response to Surgery  Recent Flowsheet Documentation  Taken 1/22/2025 0400 by Omid Arevalo RN  Family/Support System Care: self-care encouraged  Taken 1/22/2025 0200 by Omid Arevalo RN  Family/Support System Care:   self-care encouraged   support provided  Taken 1/22/2025 0000 by Omid Arevalo RN  Family/Support System Care:   support provided   self-care encouraged  Taken 1/21/2025 2200 by Omid Arevalo RN  Family/Support System Care:   support provided   self-care encouraged  Taken 1/21/2025 2000 by Omid Arevalo RN  Family/Support System Care:   support provided   self-care encouraged  Goal: Absence of Bleeding  Outcome: Progressing  Goal: Effective Bowel Elimination  Outcome: Progressing  Goal: Fluid and Electrolyte Balance  Outcome: Progressing  Goal: Optimal Functional Ability  Outcome: Progressing  Intervention: Optimize Functional Status  Recent Flowsheet Documentation  Taken 1/22/2025 0400 by Omid Arevalo, RN  Activity Management: activity encouraged  Positioning/Transfer Devices:   pillows   in use  Taken 1/22/2025 0200 by Omid Arevalo RN  Activity Management: activity encouraged  Positioning/Transfer Devices:   pillows   in use  Taken 1/22/2025 0000 by Omid Arevalo, RN  Activity Management: activity encouraged  Positioning/Transfer Devices:   pillows   in use  Taken  1/21/2025 2200 by Omid Arevalo RN  Activity Management: activity encouraged  Positioning/Transfer Devices:   pillows   in use  Taken 1/21/2025 2000 by Omid Arevalo RN  Activity Management: activity encouraged  Positioning/Transfer Devices:   pillows   in use  Goal: Absence of Infection Signs and Symptoms  Outcome: Progressing  Intervention: Prevent or Manage Infection  Recent Flowsheet Documentation  Taken 1/22/2025 0400 by Omid Arevalo RN  Infection Prevention:   environmental surveillance performed   rest/sleep promoted   single patient room provided  Taken 1/22/2025 0200 by Omid Arevalo RN  Infection Prevention:   environmental surveillance performed   rest/sleep promoted   single patient room provided  Taken 1/22/2025 0000 by Omid Arevalo RN  Infection Prevention:   environmental surveillance performed   rest/sleep promoted   single patient room provided  Taken 1/21/2025 2200 by Omid Arevalo RN  Infection Prevention:   environmental surveillance performed   rest/sleep promoted   single patient room provided  Taken 1/21/2025 2000 by Omid Arevalo RN  Infection Prevention:   rest/sleep promoted   single patient room provided   hand hygiene promoted   environmental surveillance performed  Goal: Optimal Neurologic Function  Outcome: Progressing  Intervention: Optimize Neurologic Function  Recent Flowsheet Documentation  Taken 1/22/2025 0400 by Omid Arevalo RN  Body Position: position changed independently  Pressure Reduction Devices:   positioning supports utilized   pressure-redistributing mattress utilized  Taken 1/22/2025 0200 by Omid Arevalo RN  Body Position: position changed independently  Pressure Reduction Devices:   pressure-redistributing mattress utilized   positioning supports utilized  Taken 1/22/2025 0000 by Omid Arevalo RN  Body Position: position changed independently  Pressure Reduction Devices: pressure-redistributing mattress  utilized  Taken 1/21/2025 2200 by Omid Arevalo RN  Body Position: position changed independently  Pressure Reduction Devices: pressure-redistributing mattress utilized  Taken 1/21/2025 2000 by Omid Arevalo RN  Body Position:   legs elevated   position changed independently   supine  Pressure Reduction Devices: pressure-redistributing mattress utilized  Goal: Anesthesia/Sedation Recovery  Outcome: Progressing  Intervention: Optimize Anesthesia Recovery  Recent Flowsheet Documentation  Taken 1/22/2025 0400 by Omid Arevalo RN  Safety Promotion/Fall Prevention:   nonskid shoes/slippers when out of bed   safety round/check completed   assistive device/personal items within reach   clutter free environment maintained   fall prevention program maintained   lighting adjusted   room organization consistent  Taken 1/22/2025 0200 by Omid Arevalo RN  Safety Promotion/Fall Prevention:   activity supervised   assistive device/personal items within reach   clutter free environment maintained   fall prevention program maintained   lighting adjusted   nonskid shoes/slippers when out of bed   room organization consistent   safety round/check completed  Taken 1/22/2025 0000 by Omid Arevalo RN  Safety Promotion/Fall Prevention:   nonskid shoes/slippers when out of bed   safety round/check completed   assistive device/personal items within reach   clutter free environment maintained   fall prevention program maintained   lighting adjusted   room organization consistent  Taken 1/21/2025 2200 by Omid Arevalo RN  Safety Promotion/Fall Prevention:   nonskid shoes/slippers when out of bed   safety round/check completed   assistive device/personal items within reach   clutter free environment maintained   fall prevention program maintained   lighting adjusted   room organization consistent  Taken 1/21/2025 2000 by Omid Arevalo RN  Patient Tolerance (IS): good  Safety Promotion/Fall Prevention:    activity supervised   assistive device/personal items within reach   clutter free environment maintained   fall prevention program maintained   lighting adjusted   nonskid shoes/slippers when out of bed   room organization consistent   safety round/check completed  Administration (IS):   proper technique demonstrated   self-administered  Level Incentive Spirometer (mL): 1500  Incentive Spirometer Predicted Level (mL): 1500  Number of Repetitions (IS): 10  Goal: Optimal Pain Control and Function  Outcome: Progressing  Intervention: Prevent or Manage Pain  Recent Flowsheet Documentation  Taken 1/22/2025 0400 by Omid Arevalo RN  Diversional Activities:   television   smartphone  Taken 1/22/2025 0200 by Omid Arevalo RN  Diversional Activities:   television   smartphone  Taken 1/22/2025 0000 by Omid Arevalo RN  Diversional Activities:   smartphone   television  Taken 1/21/2025 2200 by Omid Arevalo RN  Diversional Activities:   television   smartphone  Taken 1/21/2025 2000 by Omid Arevalo RN  Diversional Activities:   television   smartphone  Goal: Nausea and Vomiting Relief  Outcome: Progressing     Problem: Comorbidity Management  Goal: Blood Pressure in Desired Range  Outcome: Progressing  Intervention: Maintain Blood Pressure Management  Recent Flowsheet Documentation  Taken 1/22/2025 0400 by Omid Arevalo RN  Medication Review/Management: medications reviewed  Taken 1/22/2025 0200 by Omid Arevalo RN  Medication Review/Management: medications reviewed  Taken 1/22/2025 0000 by Omid Arevalo RN  Medication Review/Management: medications reviewed  Taken 1/21/2025 2200 by Omid Arevalo, RN  Medication Review/Management: medications reviewed  Taken 1/21/2025 2000 by Omid Arevalo RN  Medication Review/Management: medications reviewed     Problem: Fall Injury Risk  Goal: Absence of Fall and Fall-Related Injury  Outcome: Progressing  Intervention: Identify and Manage  Contributors  Recent Flowsheet Documentation  Taken 1/22/2025 0400 by Omid Arevalo, RN  Medication Review/Management: medications reviewed  Taken 1/22/2025 0200 by Omid Arevalo, RN  Medication Review/Management: medications reviewed  Taken 1/22/2025 0000 by Omid Arevalo, RN  Medication Review/Management: medications reviewed  Taken 1/21/2025 2200 by Omid Arevalo, RN  Medication Review/Management: medications reviewed  Taken 1/21/2025 2000 by Omid Arevalo RN  Medication Review/Management: medications reviewed  Intervention: Promote Injury-Free Environment  Recent Flowsheet Documentation  Taken 1/22/2025 0400 by Omid Arevalo, RN  Safety Promotion/Fall Prevention:   nonskid shoes/slippers when out of bed   safety round/check completed   assistive device/personal items within reach   clutter free environment maintained   fall prevention program maintained   lighting adjusted   room organization consistent  Taken 1/22/2025 0200 by Omid Arevalo RN  Safety Promotion/Fall Prevention:   activity supervised   assistive device/personal items within reach   clutter free environment maintained   fall prevention program maintained   lighting adjusted   nonskid shoes/slippers when out of bed   room organization consistent   safety round/check completed  Taken 1/22/2025 0000 by Omid Arevalo RN  Safety Promotion/Fall Prevention:   nonskid shoes/slippers when out of bed   safety round/check completed   assistive device/personal items within reach   clutter free environment maintained   fall prevention program maintained   lighting adjusted   room organization consistent  Taken 1/21/2025 2200 by Omid Arevalo, RN  Safety Promotion/Fall Prevention:   nonskid shoes/slippers when out of bed   safety round/check completed   assistive device/personal items within reach   clutter free environment maintained   fall prevention program maintained   lighting adjusted   room organization  consistent  Taken 1/21/2025 2000 by Omid Arevalo, RN  Safety Promotion/Fall Prevention:   activity supervised   assistive device/personal items within reach   clutter free environment maintained   fall prevention program maintained   lighting adjusted   nonskid shoes/slippers when out of bed   room organization consistent   safety round/check completed   Goal Outcome Evaluation:

## 2025-01-22 NOTE — DISCHARGE SUMMARY
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 female presented with progressive back and bilateral lower extremity pain.  A number of years ago she underwent L4-5 fusion and did well.  In 2022 her fusion construct was extended up to the L3-4 level.  She has generally done well but has had progressive back and lower extremity pain with walking and standing intolerance.  Studies demonstrated stenosis and evidence of instability at the L2-3 level above her prior construct.  As such, she presented on 1/17/25 for additional decompression with fusion and stabilization extending to the L2-3 level       Hospital Course:   Surgery was on Friday, 1/17/25. She had a small ventral dural rent during the discectomy on the left. This rent was closed with 6-0 prolene, duragen and adherus  Dr Borja did not place a cage on the left.  . Hemovac drain was placed and the incision was closed with running nylon suture.  she was discharged to recovery and then to the surgical floor in good condition. DVT prophylaxis was accomplished with darshana/scds and heparin.   Roberts was placed and she was kept on strict bedrest and flat. HOB was elevated on POD 3 and she was allowed out of bed to work with PT on POD 4. They recommended home with assistance at discharge. She tolerated this well, her dressing remained dry and she did not have complaints of headache.  She was discharged to home later on POD 4.     Condition on Discharge:  Stable  Discharge to: home with assistance     PATIENT SPECIFIC EDUCATION/PLAN:  1. Follow-up with neurosurgical PA in 8-10 days for wound check and suture removal   2. No  driving until follow-up.  3. The patient may shower with aquacel dressing. She may remove this dressing and get the incision itself wet in the shower beginning on 1/25/25.   4. NO tub bathing or swimming until follow-up  5. Ice pack to incision(s) as needed for associated pain or swelling   6. No lifting over 5 lbs  7. Patient may sit as tolerated      Discharge Medications     Discharge Medications        New Medications        Instructions Start Date   oxyCODONE-acetaminophen 5-325 MG per tablet  Commonly known as: PERCOCET   1 tablet, Oral, 3 Times Daily PRN             Continue These Medications        Instructions Start Date   amLODIPine 10 MG tablet  Commonly known as: NORVASC   10 mg, Daily      aspirin 81 MG EC tablet   81 mg, Daily      baclofen 10 MG tablet  Commonly known as: LIORESAL   10 mg, 3 Times Daily PRN      benazepril 40 MG tablet  Commonly known as: LOTENSIN   40 mg, Daily      FLAX SEED OIL PO   Take  by mouth.      fluticasone 50 MCG/ACT nasal spray  Commonly known as: FLONASE   1 spray      hydroCHLOROthiazide 25 MG tablet   25 mg, Daily      latanoprost 0.005 % ophthalmic solution  Commonly known as: XALATAN   No dose, route, or frequency recorded.      metoprolol succinate  MG 24 hr tablet  Commonly known as: TOPROL-XL   100 mg, Daily      multivitamin with minerals tablet tablet   1 tablet, Daily      omeprazole 40 MG capsule  Commonly known as: priLOSEC   40 mg      oxybutynin XL 10 MG 24 hr tablet  Commonly known as: DITROPAN-XL   10 mg, Daily      rosuvastatin 40 MG tablet  Commonly known as: CRESTOR   40 mg, Nightly             Stop These Medications      HYDROcodone-acetaminophen 5-325 MG per tablet  Commonly known as: NORCO              Follow-up Appointments  Future Appointments   Date Time Provider Department Center   1/31/2025 12:30 PM Ave Chanel PA-C [facility]   2/26/2025  2:45 PM Cesar Funez MD [facility]     Additional Instructions for the  Follow-ups that You Need to Schedule       Discharge Follow-up with Specified Provider: Jonh; 2 Weeks   As directed      To: Jonh   Follow Up: 2 Weeks   Follow Up Details: Follow-up with physician's assistant in 8-10 days for wound check and suture removal                Referring Provider  Roxana Gibson*    PCP   Regan Delgado MD Talitha L Hunt, PA-C  01/22/25  16:37 EST

## 2025-01-22 NOTE — PLAN OF CARE
Goal Outcome Evaluation:  Plan of Care Reviewed With: patient        Progress: improving  Outcome Evaluation: Pt. continues to present below baseline function w/generalized weakness, balance deficits and acute spinal precautions and pain affecting her ability to safely participate in functional mobility. She performed transfers, ambulated 130' and navigated 1 step w/front wheeled walker, contact guard assist. Activity limited by fatigue and pain. Pt. educated for car transfers, family assist and spinal precautions. Continue acute PT POC to progress as tolerated.    Anticipated Discharge Disposition (PT): home with assist

## 2025-01-22 NOTE — THERAPY TREATMENT NOTE
Patient Name: Eneida Bartholomew  : 1950    MRN: [id number]                              Today's Date: 2025       Admit Date: 2025    Visit Dx:     ICD-10-CM ICD-9-CM   1. Lumbar stenosis with neurogenic claudication  M48.062 724.03     Patient Active Problem List   Diagnosis    Lumbar stenosis with neurogenic claudication    Coronary artery disease    Hypertension    Hyperlipidemia    Spinal stenosis, lumbar region, with neurogenic claudication    Status post lumbar spinal fusion    Lumbar postlaminectomy syndrome    Physical deconditioning    Gait disturbance    Lumbar spinal stenosis due to adjacent segment disease after fusion procedure    S/P lumbar fusion     Past Medical History:   Diagnosis Date    Arthritis     Arthritis     Bronchitis     Chicken pox     Coronary artery disease     Fibromyalgia     GERD (gastroesophageal reflux disease)     Glaucoma     Glaucoma     History of cardiac cath     History of echocardiogram     History of Holter monitoring     History of migraine     History of PTCA     History of stress test     Hyperlipidemia     Hypertension     Low back pain     Lumbosacral disc disease     Measles     Stomach ulcer     UTI (urinary tract infection)      Past Surgical History:   Procedure Laterality Date    BREAST BIOPSY Right     BUNIONECTOMY Bilateral     x4    CATARACT EXTRACTION EXTRACAPSULAR W/ INTRAOCULAR LENS IMPLANTATION Bilateral     CHOLECYSTECTOMY      CORONARY ANGIOPLASTY WITH STENT PLACEMENT      EYE SURGERY Bilateral     CATARACTS REMOVED    KNEE ARTHROSCOPY Right     LUMBAR LAMINECTOMY WITH FUSION N/A 2019    Procedure: LUMBAR FUSION DECOMPRESSON WITH PEDICLE SCREWS L4-5, LAMINECTOMY, FORAMINOTOMY RIGHT L3-4;  Surgeon: Yoni Borja MD;  Location: [facility];  Service: Neurosurgery    LUMBAR LAMINECTOMY WITH FUSION N/A 1/3/2022    Procedure: LUMBAR FUSION extension DECOMPRESSON WITH PEDICLE SCREWS L3-4, with exploration L2-3, left, discectomy;   Surgeon: Yoni Borja MD;  Location: [facility] OR;  Service: Neurosurgery;  Laterality: N/A;    LUMBAR LAMINECTOMY WITH FUSION N/A 1/17/2025    Procedure: TRANSFORAMINAL LUMBAR INTERBODY FUSION WIHT PEDICLE SCREWS L2-3, REMOVAL OF L5 HARDWARE;  Surgeon: Yoni Borja MD;  Location: [facility] OR;  Service: Neurosurgery;  Laterality: N/A;      General Information       [facility] Name 01/22/25 1142          Physical Therapy Time and Intention    Document Type therapy note (daily note)  -     Mode of Treatment physical therapy  -       Row Name 01/22/25 1142          General Information    Patient Profile Reviewed yes  -     Existing Precautions/Restrictions fall;spinal  -     Barriers to Rehab none identified  -       Row Name 01/22/25 1142          Living Environment    People in Home alone  -       Row Name 01/22/25 1142          Home Main Entrance    Number of Stairs, Main Entrance one  -     Stair Railings, Main Entrance none  -       Row Name 01/22/25 1142          Cognition    Orientation Status (Cognition) oriented x 4  -       Row Name 01/22/25 1142          Safety Issues/Impairments Affecting Functional Mobility    Safety Issues Affecting Function (Mobility) awareness of need for assistance;insight into deficits/self-awareness;judgment;positioning of assistive device;safety precaution awareness;problem-solving;safety precautions follow-through/compliance;sequencing abilities  -     Impairments Affecting Function (Mobility) balance;endurance/activity tolerance;range of motion (ROM);pain;strength;postural/trunk control  -               User Key  (r) = Recorded By, (t) = Taken By, (c) = Cosigned By      Initials Name Provider Type     Sara Montes PT Physical Therapist                   Mobility       Row Name 01/22/25 1145          Bed Mobility    Comment, (Bed Mobility) up in chair  -       Row Name 01/22/25 1145          Sit-Stand Transfer    Sit-Stand [place] (Transfers) contact  guard;verbal cues  -     Assistive Device (Sit-Stand Transfers) walker, front-wheeled  -SS     Comment, (Sit-Stand Transfer) VC for hand placement, appropriate alignment, emphasis on lowering with eccentric control; on initial sit to stand pt. very tremulous, took a seated rest break w/pursed lip breathing; tried again with improvement in symptoms; RN aware  -       Row Name 01/22/25 1145          Gait/Stairs (Locomotion)    [place] Level (Gait) verbal cues;contact guard  -     Assistive Device (Gait) walker, front-wheeled  -SS     Patient was able to Ambulate yes  -SS     Distance in Feet (Gait) 130  -SS     Deviations/Abnormal Patterns (Gait) bilateral deviations;base of support, narrow;abigail decreased;gait speed decreased;stride length decreased;weight shifting decreased  -SS     Bilateral Gait Deviations heel strike decreased;forward flexed posture  -SS     [place] Level (Stairs) contact guard;verbal cues  -     Assistive Device (Stairs) walker, front-wheeled  -     Number of Steps (Stairs) 1  -SS     Ascending Technique (Stairs) step-to-step  -SS     Descending Technique (Stairs) step-to-step  -SS     Comment, (Gait/Stairs) Pt. ambulated with a step through gait pattern at a decreased speed. VC for upright posture, heel toe gait pattern, AD management. She performed 1 step w/backward technique. VC for sequencing w/stronger leg leading in ascending and weaker leg leading in descending. No buckling or LOB noted.  -               User Key  (r) = Recorded By, (t) = Taken By, (c) = Cosigned By      Initials Name Provider Type     Sara Montes PT Physical Therapist                   Obj/Interventions       Row Name 01/22/25 1140          Motor Skills    Therapeutic Exercise other (see comments)  pt declined due to fatigue; reviewed importance and compliance  -       Row Name 01/22/25 1141          Balance    Balance Assessment sitting static balance;sitting dynamic balance;standing  static balance;standing dynamic balance  -     Static Sitting Balance supervision  -     Dynamic Sitting Balance standby assist  -     Position, Sitting Balance unsupported;sitting in chair  -     Static Standing Balance contact guard  -     Dynamic Standing Balance contact guard  -     Position/Device Used, Standing Balance supported;walker, front-wheeled  -     Balance Interventions sitting;standing;sit to stand;supported;static;dynamic  -               User Key  (r) = Recorded By, (t) = Taken By, (c) = Cosigned By      Initials Name Provider Type    SS Sara Montes, TANYA Physical Therapist                   Goals/Plan    No documentation.                  Clinical Impression       Row Name 01/22/25 1150          Pain    Pretreatment Pain Rating 2/10  -SS     Posttreatment Pain Rating 4/10  -     Pain Location back  -     Pain Side/Orientation lower  -     Pain Management Interventions activity modification encouraged;breathing exercises utilized;cold applied;complementary health approaches promoted;diversional activity provided;movement retraining implemented;nursing notified;exercise or physical activity utilized;positioning techniques utilized  -     Response to Pain Interventions activity level improved;functional ability improved;intervention effective per patient report;activity participation with increased pain;nonverbal indicators present  -SS       Row Name 01/22/25 1150          Plan of Care Review    Plan of Care Reviewed With patient  -SS     Progress improving  -     Outcome Evaluation Pt. continues to present below baseline function w/generalized weakness, balance deficits and acute spinal precautions and pain affecting her ability to safely participate in functional mobility. She performed transfers, ambulated 130' and navigated 1 step w/front wheeled walker, contact guard assist. Activity limited by fatigue and pain. Pt. educated for car transfers, family assist and spinal  precautions. Continue acute PT POC to progress as tolerated.  -       Row Name 01/22/25 1150          Therapy Assessment/Plan (PT)    Rehab Potential (PT) good  -SS     Criteria for Skilled Interventions Met (PT) yes;meets criteria;skilled treatment is necessary  -SS     Therapy Frequency (PT) daily  -SS       Row Name 01/22/25 1150          Vital Signs    Pre Systolic BP Rehab 128  -SS     Pre Treatment Diastolic BP 79  -SS     Post Systolic BP Rehab 140  -SS     Post Treatment Diastolic BP 83  -SS     Pretreatment Heart Rate (beats/min) 95  -SS     Posttreatment Heart Rate (beats/min) 91  -SS     Pre SpO2 (%) 98  -SS     O2 Delivery Pre Treatment room air  -SS     Post SpO2 (%) 98  -SS     O2 Delivery Post Treatment room air  -SS     Pre Patient Position Sitting  -SS     Post Patient Position Sitting  -SS       Row Name 01/22/25 1150          Positioning and Restraints    Pre-Treatment Position sitting in chair/recliner  -SS     Post Treatment Position chair  -SS     In Chair notified nsg;reclined;call light within reach;encouraged to call for assist;exit alarm on;legs elevated  -               User Key  (r) = Recorded By, (t) = Taken By, (c) = Cosigned By      Initials Name Provider Type    SS Sara Montes, PT Physical Therapist                   Outcome Measures       Row Name 01/22/25 1150          How much help from another person do you currently need...    Turning from your back to your side while in flat bed without using bedrails? 3  -SS     Moving from lying on back to sitting on the side of a flat bed without bedrails? 3  -SS     Moving to and from a bed to a chair (including a wheelchair)? 3  -SS     Standing up from a chair using your arms (e.g., wheelchair, bedside chair)? 3  -SS     Climbing 3-5 steps with a railing? 2  -SS     To walk in hospital room? 3  -SS     AM-PAC 6 Clicks Score (PT) 17  -SS     Highest Level of Mobility Goal 5 --> Static standing  -SS       Row Name 01/22/25 1157           Functional Assessment    Outcome Measure Options AM-PAC 6 Clicks Basic Mobility (PT)  -               User Key  (r) = Recorded By, (t) = Taken By, (c) = Cosigned By      Initials Name Provider Type     Sara Montes, PT Physical Therapist                                 Physical Therapy Education       Title: PT OT SLP Therapies (In Progress)       Topic: Physical Therapy (Done)       Point: Mobility training (Done)       Learning Progress Summary            Patient Eager, E,H, VU,DU,NR by  at 1/22/2025 1157    Comment: Reviewed safety/technique w/transfers, ambulation, HEP, stair navigation, car transfers, family assist, PT POC    Acceptance, E, VU,NR by  at 1/21/2025 1048                      Point: Home exercise program (Done)       Learning Progress Summary            Patient Eager, E,H, VU,DU,NR by  at 1/22/2025 1157    Comment: Reviewed safety/technique w/transfers, ambulation, HEP, stair navigation, car transfers, family assist, PT POC    Acceptance, E, VU,NR by  at 1/21/2025 1048                      Point: Body mechanics (Done)       Learning Progress Summary            Patient Eager, E,H, VU,DU,NR by  at 1/22/2025 1157    Comment: Reviewed safety/technique w/transfers, ambulation, HEP, stair navigation, car transfers, family assist, PT POC    Acceptance, E, VU,NR by  at 1/21/2025 1048                      Point: Precautions (Done)       Learning Progress Summary            Patient Eager, E,H, VU,DU,NR by  at 1/22/2025 1157    Comment: Reviewed safety/technique w/transfers, ambulation, HEP, stair navigation, car transfers, family assist, PT POC    Acceptance, E, VU,NR by  at 1/21/2025 1048                                      User Key       Initials Effective Dates Name Provider Type [facility] 06/01/21 -  Sara Montes, PT Physical Therapist PT     07/11/24 -  Neris Mai, TANYA Physical Therapist PT                  PT Recommendation and Plan     Progress: improving  Outcome  Evaluation: Pt. continues to present below baseline function w/generalized weakness, balance deficits and acute spinal precautions and pain affecting her ability to safely participate in functional mobility. She performed transfers, ambulated 130' and navigated 1 step w/front wheeled walker, contact guard assist. Activity limited by fatigue and pain. Pt. educated for car transfers, family assist and spinal precautions. Continue acute PT POC to progress as tolerated.     Time Calculation:         PT Charges       Row Name 01/22/25 1157             Time Calculation    Start Time 1059  -SS      PT Received On 01/22/25  -SS         Timed Charges    68908 - PT Therapeutic Exercise Minutes 10  -SS      31825 - Gait Training Minutes  10  -SS      45098 - PT Therapeutic Activity Minutes 3  -SS         Total Minutes    Timed Charges Total Minutes 23  -SS       Total Minutes 23  -SS                User Key  (r) = Recorded By, (t) = Taken By, (c) = Cosigned By      Initials Name Provider Type    SS Sara Montes, PT Physical Therapist                  Therapy Charges for Today       Code Description Service Date Service Provider Modifiers Qty    61139172953 HC PT THER PROC EA 15 MIN 1/22/2025 Sara Montes, PT GP 1    [id number] HC GAIT TRAINING EA 15 MIN 1/22/2025 Sara Montes, PT GP 1            PT G-Codes  Outcome Measure Options: AM-PAC 6 Clicks Basic Mobility (PT)  AM-PAC 6 Clicks Score (PT): 17  AM-PAC 6 Clicks Score (OT): 17  PT Discharge Summary  Anticipated Discharge Disposition (PT): home with assist    Sara Montes PT  1/22/2025

## 2025-01-23 ENCOUNTER — READMISSION MANAGEMENT (OUTPATIENT)
Dept: CALL CENTER | Facility: HOSPITAL | Age: 75
End: 2025-01-23
Payer: MEDICARE

## 2025-01-23 NOTE — OUTREACH NOTE
Prep Survey      Flowsheet Row Responses   Congregational facility patient discharged from? [facility]   Is LACE score < 7 ? No   Eligibility Readm Mgmt   Discharge diagnosis TRANSFORAMINAL LUMBAR INTERBODY FUSION WIHT PEDICLE SCREWS L2-3, REMOVAL OF L5 HARDWARE   Does the patient have one of the following disease processes/diagnoses(primary or secondary)? General Surgery   Does the patient have Home health ordered? No   Is there a DME ordered? No   Prep survey completed? Yes            Amelia Roca Registered Nurse

## 2025-01-23 NOTE — PAYOR COMM NOTE
"  Neris Chapin RN  [facility]  Utilization Management  P:[phone]  F:[phone]    Ref# [id number]     Eneida Bartholomew (74 y.o. Female)       Date of Birth   1950    Social Security Number       Address   [address] [place] [address]    Home Phone   [phone]    MRN   [id number]       [facility]    Marital Status                               Admission Date   1/17/25    Admission Type   Elective    Admitting Provider   Yoni Borja MD    Attending Provider       Department, Room/Bed   [facility] 3G, S356/1       Discharge Date   1/22/2025    Discharge Disposition   Home or Self Care    Discharge Destination                                 Attending Provider: (none)   Allergies: Other, Prednisone, Shellfish-derived Products, Iodinated Contrast Media, Erythromycin, Gabapentin, Omeprazole, Sulfa Antibiotics, Tramadol    Isolation: None   Infection: None   Code Status: Prior    Ht: 160 cm (62.99\")   Wt: 83.3 kg (183 lb 10.3 oz)    Admission Cmt: None   Principal Problem: Lumbar spinal stenosis due to adjacent segment disease after fusion procedure [M48.061,M51.369,Z98.1]                   Active Insurance as of 1/17/2025       Primary Coverage       Payor Plan Insurance Group Employer/Plan Group    ANTHEM MEDICARE REPLACEMENT ANTHEM MED ADV HMO [id number]       Payor Plan Address Payor Plan Phone Number Payor Plan Fax Number Effective Dates    [address] [phone]  1/1/2024 - None Entered    [facility] [address]         Subscriber Name Subscriber Birth Date Member ID       ENEIDA BARTHOLOMEW 1950 [id number]                     Emergency Contacts        (Rel.) Home Phone Work Phone Mobile Phone    Julieta Hazel (Daughter) [phone] -- [phone]    Seema Gonzalez (Sister) [phone] -- [phone]    MATTY BETHEA (Sister) [phone] -- [phone]    Mark Castle (Daughter) -- -- " [phone]                 Discharge Summary        Cat Mtz PA-C at 01/22/25 5062       Attestation signed by Yoni Borja MD at 01/23/25 8486    I have reviewed this documentation and agree.                      [facility]    Date of Admission: 1/17/2025  Date of Discharge:  1/22/2025    Discharge Diagnosis: L2-3 stenosis and instability  Lumbar transition syndrome    Procedures Performed  Procedure(s):  TRANSFORAMINAL LUMBAR INTERBODY FUSION WIHT PEDICLE SCREWS L2-3, REMOVAL OF L5 HARDWARE       History of Present Illness:  Ms Bartholomew is a 74 y.o. female presented with progressive back and bilateral lower extremity pain.  A number of years ago she underwent L4-5 fusion and did well.  In 2022 her fusion construct was extended up to the L3-4 level.  She has generally done well but has had progressive back and lower extremity pain with walking and standing intolerance.  Studies demonstrated stenosis and evidence of instability at the L2-3 level above her prior construct.  As such, she presented on 1/17/25 for additional decompression with fusion and stabilization extending to the L2-3 level       Hospital Course:   Surgery was on Friday, 1/17/25. She had a small ventral dural rent during the discectomy on the left. This rent was closed with 6-0 prolene, duragen and adherus  Dr Borja did not place a cage on the left.  . Hemovac drain was placed and the incision was closed with running nylon suture.  she was discharged to recovery and then to the surgical floor in good condition. DVT prophylaxis was accomplished with darshana/scds and heparin.   Roberts was placed and she was kept on strict bedrest and flat. HOB was elevated on POD 3 and she was allowed out of bed to work with PT on POD 4. They recommended home with assistance at discharge. She tolerated this well, her dressing remained dry and she did not have complaints of headache.  She was discharged to  home later on POD 4.     Condition on Discharge:  Stable  Discharge to: home with assistance     PATIENT SPECIFIC EDUCATION/PLAN:  1. Follow-up with neurosurgical PA in 8-10 days for wound check and suture removal   2. No driving until follow-up.  3. The patient may shower with aquacel dressing. She may remove this dressing and get the incision itself wet in the shower beginning on 1/25/25.   4. NO tub bathing or swimming until follow-up  5. Ice pack to incision(s) as needed for associated pain or swelling   6. No lifting over 5 lbs  7. Patient may sit as tolerated      Discharge Medications     Discharge Medications        New Medications        Instructions Start Date   oxyCODONE-acetaminophen 5-325 MG per tablet  Commonly known as: PERCOCET   1 tablet, Oral, 3 Times Daily PRN             Continue These Medications        Instructions Start Date   amLODIPine 10 MG tablet  Commonly known as: NORVASC   10 mg, Daily      aspirin 81 MG EC tablet   81 mg, Daily      baclofen 10 MG tablet  Commonly known as: LIORESAL   10 mg, 3 Times Daily PRN      benazepril 40 MG tablet  Commonly known as: LOTENSIN   40 mg, Daily      FLAX SEED OIL PO   Take  by mouth.      fluticasone 50 MCG/ACT nasal spray  Commonly known as: FLONASE   1 spray      hydroCHLOROthiazide 25 MG tablet   25 mg, Daily      latanoprost 0.005 % ophthalmic solution  Commonly known as: XALATAN   No dose, route, or frequency recorded.      metoprolol succinate  MG 24 hr tablet  Commonly known as: TOPROL-XL   100 mg, Daily      multivitamin with minerals tablet tablet   1 tablet, Daily      omeprazole 40 MG capsule  Commonly known as: priLOSEC   40 mg      oxybutynin XL 10 MG 24 hr tablet  Commonly known as: DITROPAN-XL   10 mg, Daily      rosuvastatin 40 MG tablet  Commonly known as: CRESTOR   40 mg, Nightly             Stop These Medications      HYDROcodone-acetaminophen 5-325 MG per tablet  Commonly known as: NORCO              Follow-up  Appointments  Future Appointments   Date Time Provider Department Center   1/31/2025 12:30 PM [address] Chanel PA-C [facility]   2/26/2025  2:45 PM Cesar Funez MD [facility]     Additional Instructions for the Follow-ups that You Need to Schedule       Discharge Follow-up with Specified Provider: Jonh; 2 Weeks   As directed      To: Jonh   Follow Up: 2 Weeks   Follow Up Details: Follow-up with physician's assistant in 8-10 days for wound check and suture removal                Referring Provider  Roxana Gibson*    PCP   Regan Delgado MD Talitha L Hunt, PA-C  01/22/25  16:37 EST                Electronically signed by Yoni Borja MD at 01/23/25 0554

## 2025-01-27 ENCOUNTER — READMISSION MANAGEMENT (OUTPATIENT)
Dept: CALL CENTER | Facility: HOSPITAL | Age: 75
End: 2025-01-27
Payer: MEDICARE

## 2025-01-27 NOTE — OUTREACH NOTE
General Surgery Week 1 Survey      Flowsheet Row Responses   [facility] patient discharged from? [place]   Does the patient have one of the following disease processes/diagnoses(primary or secondary)? General Surgery   Week 1 attempt successful? Yes   Call start time 0923   Call end time 0928   Discharge diagnosis TRANSFORAMINAL LUMBAR INTERBODY FUSION WITH PEDICLE SCREWS L2-3, REMOVAL OF L5 HARDWARE   Meds reviewed with patient/caregiver? Yes   Is the patient having any side effects they believe may be caused by any medication additions or changes? No   Does the patient have all medications related to this admission filled (includes all antibiotics, pain medications, etc.) Yes   Is the patient taking all medications as directed (includes completed medication regime)? Yes   Does the patient have a follow up appointment scheduled with their surgeon? Yes   Has the patient kept scheduled appointments due by today? N/A   Comments Post-op appt with neurosurgery on 1/31   Has home health visited the patient within 72 hours of discharge? N/A   Psychosocial issues? No   Did the patient receive a copy of their discharge instructions? Yes   Nursing interventions Reviewed instructions with patient   What is the patient's perception of their health status since discharge? New symptoms unrelated to diagnosis  [states she is having to urinate frequently, advised her to follow up with PCP due to possible concerns of UTI, patient verbalized understanding.]   Nursing interventions Nurse provided patient education   Is the patient /caregiver able to teach back basic post-op care? Lifting as instructed by MD in discharge instructions, Do not remove steri-strips, Keep incision areas clean,dry and protected, No tub bath, swimming, or hot tub until instructed by MD, Take showers only when approved by MD-sponge bathe until then, Drive as instructed by MD in discharge instructions, Practice 'cough and deep breath', Continue use of  incentive spirometry at least 1 week post discharge   Is the patient/caregiver able to teach back signs and symptoms of incisional infection? Fever, Pus or odor from incision, Incisional warmth, Increased drainage or bleeding, Increased redness, swelling or pain at the incisonal site   Is the patient/caregiver able to teach back the hierarchy of who to call/visit for symptoms/problems? PCP, Specialist, Home health nurse, Urgent Care, ED, 911 Yes   Week 1 call completed? Yes   Graduated Yes   Is the patient interested in additional calls from an ambulatory ? No   Would this patient benefit from a Referral to [facility] Social Work? No   Wrap up additional comments Doing well, all concerns addressed, confirmed upcoming follow up appt with neurosurgeon for 1/31.   Call end time 0928            Carolyn BARRON - Registered Nurse

## 2025-01-31 ENCOUNTER — OFFICE VISIT (OUTPATIENT)
Dept: NEUROSURGERY | Facility: CLINIC | Age: 75
End: 2025-01-31
Payer: MEDICARE

## 2025-01-31 VITALS
TEMPERATURE: 97.5 F | SYSTOLIC BLOOD PRESSURE: 138 MMHG | BODY MASS INDEX: 32.78 KG/M2 | WEIGHT: 185 LBS | DIASTOLIC BLOOD PRESSURE: 66 MMHG | HEIGHT: 63 IN

## 2025-01-31 DIAGNOSIS — Z48.02 ENCOUNTER FOR REMOVAL OF SUTURES: Primary | ICD-10-CM

## 2025-01-31 DIAGNOSIS — M48.062 SPINAL STENOSIS OF LUMBAR REGION WITH NEUROGENIC CLAUDICATION: ICD-10-CM

## 2025-01-31 PROCEDURE — 3075F SYST BP GE 130 - 139MM HG: CPT | Performed by: PHYSICIAN ASSISTANT

## 2025-01-31 PROCEDURE — 1159F MED LIST DOCD IN RCRD: CPT | Performed by: PHYSICIAN ASSISTANT

## 2025-01-31 PROCEDURE — 99024 POSTOP FOLLOW-UP VISIT: CPT | Performed by: PHYSICIAN ASSISTANT

## 2025-01-31 PROCEDURE — 1160F RVW MEDS BY RX/DR IN RCRD: CPT | Performed by: PHYSICIAN ASSISTANT

## 2025-01-31 PROCEDURE — 3078F DIAST BP <80 MM HG: CPT | Performed by: PHYSICIAN ASSISTANT

## 2025-01-31 RX ORDER — CIPROFLOXACIN 500 MG/1
TABLET, FILM COATED ORAL
COMMUNITY
Start: 2025-01-29

## 2025-01-31 RX ORDER — TRAMADOL HYDROCHLORIDE 50 MG/1
50 TABLET ORAL EVERY 6 HOURS PRN
Qty: 40 TABLET | Refills: 0 | Status: SHIPPED | OUTPATIENT
Start: 2025-01-31

## 2025-01-31 NOTE — PROGRESS NOTES
Patient: Eneida Bartholomew  : 1950  Chart #: [id number]    Date of Service: 2025    CHIEF COMPLAINT: Lumbar transition syndrome    History of Present Illness Ms. Morales is a 74-year-old woman who presented with progressive back and bilateral lower extremity pain.  A number of years ago she underwent L4-5 fusion and did well.  In  her fusion construct was extended to L3-4.  In general she has done well until recent years when she developed recurrent neurogenic claudication.  Studies demonstrated stenosis and evidence of instability at L2-3.  As such on 2025 she underwent fusion extension with removal of old pedicle screw hardware at L5.  A small dural rent was encountered intraoperatively and patient was kept flat for several days.  Today patient presents for suture removal.  She is quite sore still.  It has been difficult getting out today.  She denies new or worsening symptoms.  No incisional issues. She was treated for a UTI and constipation post-operatively and is doing better in that regard.       Past Medical History:   Diagnosis Date    Arthritis     Arthritis     Bronchitis     Chicken pox     Coronary artery disease     Fibromyalgia     GERD (gastroesophageal reflux disease)     Glaucoma     Glaucoma     History of cardiac cath     History of echocardiogram     History of Holter monitoring     History of migraine     History of PTCA     History of stress test     Hyperlipidemia     Hypertension     Low back pain     Lumbosacral disc disease     Measles     Stomach ulcer     UTI (urinary tract infection)          Current Outpatient Medications:     amLODIPine (NORVASC) 10 MG tablet, Take 1 tablet by mouth Daily., Disp: , Rfl:     aspirin 81 MG EC tablet, Take 1 tablet by mouth Daily., Disp: , Rfl:     baclofen (LIORESAL) 10 MG tablet, 1 tablet 3 (Three) Times a Day As Needed., Disp: , Rfl:     benazepril (LOTENSIN) 40 MG tablet, Take 1 tablet by mouth Daily., Disp: , Rfl:      ciprofloxacin (CIPRO) 500 MG tablet, , Disp: , Rfl:     Flaxseed, Linseed, (FLAX SEED OIL PO), Take  by mouth., Disp: , Rfl:     fluticasone (FLONASE) 50 MCG/ACT nasal spray, 1 spray., Disp: , Rfl:     hydroCHLOROthiazide (HYDRODIURIL) 25 MG tablet, Take 1 tablet by mouth Daily., Disp: , Rfl:     latanoprost (XALATAN) 0.005 % ophthalmic solution, , Disp: , Rfl:     metoprolol succinate XL (TOPROL-XL) 100 MG 24 hr tablet, 1 tablet Daily., Disp: , Rfl:     Multiple Vitamins-Minerals (MULTIVITAL PO), Take 1 tablet by mouth Daily., Disp: , Rfl:     omeprazole (priLOSEC) 40 MG capsule, 1 capsule., Disp: , Rfl:     oxybutynin XL (DITROPAN-XL) 10 MG 24 hr tablet, Take 1 tablet by mouth Daily., Disp: , Rfl:     oxyCODONE-acetaminophen (PERCOCET) 5-325 MG per tablet, Take 1 tablet by mouth 3 (Three) Times a Day As Needed (Pain)., Disp: 15 tablet, Rfl: 0    rosuvastatin (CRESTOR) 40 MG tablet, Take 1 tablet by mouth Every Night., Disp: , Rfl:   No current facility-administered medications for this visit.    Facility-Administered Medications Ordered in Other Visits:     mupirocin (BACTROBAN) 2 % nasal ointment, , Nasal, BID, Yoni Borja MD    Past Surgical History:   Procedure Laterality Date    BREAST BIOPSY Right     BUNIONECTOMY Bilateral     x4    CATARACT EXTRACTION EXTRACAPSULAR W/ INTRAOCULAR LENS IMPLANTATION Bilateral     CHOLECYSTECTOMY      CORONARY ANGIOPLASTY WITH STENT PLACEMENT      EYE SURGERY Bilateral     CATARACTS REMOVED    KNEE ARTHROSCOPY Right 2021    LUMBAR LAMINECTOMY WITH FUSION N/A 5/2/2019    Procedure: LUMBAR FUSION DECOMPRESSON WITH PEDICLE SCREWS L4-5, LAMINECTOMY, FORAMINOTOMY RIGHT L3-4;  Surgeon: Yoni Borja MD;  Location: [facility];  Service: Neurosurgery    LUMBAR LAMINECTOMY WITH FUSION N/A 1/3/2022    Procedure: LUMBAR FUSION extension DECOMPRESSON WITH PEDICLE SCREWS L3-4, with exploration L2-3, left, discectomy;  Surgeon: Yoni Borja MD;  Location:  [facility];  Service:  Neurosurgery;  Laterality: N/A;    LUMBAR LAMINECTOMY WITH FUSION N/A 1/17/2025    Procedure: TRANSFORAMINAL LUMBAR INTERBODY FUSION WIHT PEDICLE SCREWS L2-3, REMOVAL OF L5 HARDWARE;  Surgeon: Yoni Borja MD;  Location: [facility];  Service: Neurosurgery;  Laterality: N/A;       Social History     Socioeconomic History    Marital status:      Spouse name: Claude   Tobacco Use    Smoking status: Never     Passive exposure: Never    Smokeless tobacco: Never   Vaping Use    Vaping status: Never Used   Substance and Sexual Activity    Alcohol use: No    Drug use: No    Sexual activity: Defer         Review of Systems   Constitutional:  Negative for activity change, appetite change, chills, diaphoresis, fatigue, fever and unexpected weight change.   HENT:  Negative for congestion, dental problem, drooling, ear discharge, ear pain, facial swelling, hearing loss, mouth sores, nosebleeds, postnasal drip, rhinorrhea, sinus pressure, sinus pain, sneezing, sore throat, tinnitus, trouble swallowing and voice change.    Eyes:  Negative for photophobia, pain, discharge, redness, itching and visual disturbance.   Respiratory:  Negative for apnea, cough, choking, chest tightness, shortness of breath, wheezing and stridor.    Cardiovascular:  Negative for chest pain, palpitations and leg swelling.   Gastrointestinal:  Negative for abdominal distention, abdominal pain, anal bleeding, blood in stool, constipation, diarrhea, nausea, rectal pain and vomiting.   Endocrine: Negative for cold intolerance, heat intolerance, polydipsia, polyphagia and polyuria.   Genitourinary:  Negative for decreased urine volume, difficulty urinating, dyspareunia, dysuria, enuresis, flank pain, frequency, genital sores, hematuria, menstrual problem, pelvic pain, urgency, vaginal bleeding, vaginal discharge and vaginal pain.   Musculoskeletal:  Positive for back pain. Negative for arthralgias, gait problem, joint swelling, myalgias, neck pain  "and neck stiffness.   Skin:  Positive for wound. Negative for color change, pallor and rash.   Allergic/Immunologic: Negative for environmental allergies, food allergies and immunocompromised state.   Neurological:  Negative for dizziness, tremors, seizures, syncope, facial asymmetry, speech difficulty, weakness, light-headedness, numbness and headaches.   Hematological:  Negative for adenopathy. Does not bruise/bleed easily.   Psychiatric/Behavioral:  Negative for agitation, behavioral problems, confusion, decreased concentration, dysphoric mood, hallucinations, self-injury, sleep disturbance and suicidal ideas. The patient is not nervous/anxious and is not hyperactive.      Objective   Vital Signs: Blood pressure 138/66, temperature 97.5 °F (36.4 °C), temperature source Infrared, height 160 cm (62.99\"), weight 83.9 kg (185 lb), not currently breastfeeding.  Physical Exam  Skin:     Comments: Sutures were removed in a clean fashion and incision remained intact.          Assessment & Plan   Diagnosis:   L2-3 stenosis and instability status post decompression and fusion extension from prior L3-5 construct  Small intraoperative dural rent    Medical Decision Making: Sutures were removed in a clean fashion and further wound care instructions were discussed. Patient is still moving about slowly but is making progress. I would like to see her back in 3 weeks with xrays.      There are no diagnoses linked to this encounter.                              Franchesca Guthrie LPN  Patient Care Team:  Regan Delgado MD as PCP - General (Internal Medicine)  Cari Rodriguez MD as Referring Physician (Internal Medicine)  Yoni Borja MD as Consulting Physician (Neurosurgery)              "

## 2025-02-18 ENCOUNTER — HOSPITAL ENCOUNTER (OUTPATIENT)
Dept: GENERAL RADIOLOGY | Facility: HOSPITAL | Age: 75
Discharge: HOME | End: 2025-02-18
Admitting: PHYSICIAN ASSISTANT
Payer: MEDICARE

## 2025-02-18 DIAGNOSIS — M48.062 SPINAL STENOSIS OF LUMBAR REGION WITH NEUROGENIC CLAUDICATION: ICD-10-CM

## 2025-02-18 PROCEDURE — 72100 X-RAY EXAM L-S SPINE 2/3 VWS: CPT

## 2025-02-25 ENCOUNTER — OFFICE VISIT (OUTPATIENT)
Dept: NEUROSURGERY | Facility: CLINIC | Age: 75
End: 2025-02-25
Payer: MEDICARE

## 2025-02-25 VITALS — WEIGHT: 181.4 LBS | TEMPERATURE: 97.6 F | BODY MASS INDEX: 32.14 KG/M2 | HEIGHT: 63 IN

## 2025-02-25 DIAGNOSIS — M48.062 SPINAL STENOSIS OF LUMBAR REGION WITH NEUROGENIC CLAUDICATION: ICD-10-CM

## 2025-02-25 PROCEDURE — 1159F MED LIST DOCD IN RCRD: CPT | Performed by: PHYSICIAN ASSISTANT

## 2025-02-25 PROCEDURE — 99024 POSTOP FOLLOW-UP VISIT: CPT | Performed by: PHYSICIAN ASSISTANT

## 2025-02-25 PROCEDURE — 1160F RVW MEDS BY RX/DR IN RCRD: CPT | Performed by: PHYSICIAN ASSISTANT

## 2025-02-25 RX ORDER — TRAMADOL HYDROCHLORIDE 50 MG/1
50 TABLET ORAL EVERY 6 HOURS PRN
Qty: 40 TABLET | Refills: 0 | Status: SHIPPED | OUTPATIENT
Start: 2025-02-25

## 2025-02-25 NOTE — PROGRESS NOTES
Patient: Eneida Bartholomew  : 1950  Chart #: [id number]    Date of Service: 25    CHIEF COMPLAINT: Lumbar transition syndrome    History of Present Illness Ms. Morales is a 74-year-old woman who presented with progressive back and bilateral lower extremity pain.  A number of years ago she underwent L4-5 fusion and did well.  In  her fusion construct was extended to L3-4.  In general she has done well until recent years when she developed recurrent neurogenic claudication.  Studies demonstrated stenosis and evidence of instability at L2-3.  As such on 2025 she underwent fusion extension with removal of old pedicle screw hardware at L5.  A small dural rent was encountered intraoperatively and patient was kept flat for several days.  Today patient is 5 weeks post-op. She feels better than she did before surgery but still has a fair amount of pain that is limiting. She has good days and bad.       Past Medical History:   Diagnosis Date    Arthritis     Arthritis     Bronchitis     Chicken pox     Coronary artery disease     Fibromyalgia     GERD (gastroesophageal reflux disease)     Glaucoma     Glaucoma     History of cardiac cath     History of echocardiogram     History of Holter monitoring     History of migraine     History of PTCA     History of stress test     Hyperlipidemia     Hypertension     Low back pain     Lumbosacral disc disease     Measles     Stomach ulcer     UTI (urinary tract infection)          Current Outpatient Medications:     amLODIPine (NORVASC) 10 MG tablet, Take 1 tablet by mouth Daily., Disp: , Rfl:     aspirin 81 MG EC tablet, Take 1 tablet by mouth Daily., Disp: , Rfl:     baclofen (LIORESAL) 10 MG tablet, 1 tablet 3 (Three) Times a Day As Needed., Disp: , Rfl:     benazepril (LOTENSIN) 40 MG tablet, Take 1 tablet by mouth Daily., Disp: , Rfl:     ciprofloxacin (CIPRO) 500 MG tablet, , Disp: , Rfl:     Flaxseed, Linseed, (FLAX SEED OIL PO), Take  by mouth., Disp: ,  Rfl:     fluticasone (FLONASE) 50 MCG/ACT nasal spray, 1 spray., Disp: , Rfl:     hydroCHLOROthiazide (HYDRODIURIL) 25 MG tablet, Take 1 tablet by mouth Daily., Disp: , Rfl:     latanoprost (XALATAN) 0.005 % ophthalmic solution, , Disp: , Rfl:     metoprolol succinate XL (TOPROL-XL) 100 MG 24 hr tablet, 1 tablet Daily., Disp: , Rfl:     Multiple Vitamins-Minerals (MULTIVITAL PO), Take 1 tablet by mouth Daily., Disp: , Rfl:     omeprazole (priLOSEC) 40 MG capsule, 1 capsule., Disp: , Rfl:     oxybutynin XL (DITROPAN-XL) 10 MG 24 hr tablet, Take 1 tablet by mouth Daily., Disp: , Rfl:     rosuvastatin (CRESTOR) 40 MG tablet, Take 1 tablet by mouth Every Night., Disp: , Rfl:     traMADol (ULTRAM) 50 MG tablet, Take 1 tablet by mouth Every 6 (Six) Hours As Needed for Moderate Pain., Disp: 40 tablet, Rfl: 0  No current facility-administered medications for this visit.    Facility-Administered Medications Ordered in Other Visits:     mupirocin (BACTROBAN) 2 % nasal ointment, , Nasal, BID, Yoni Borja MD    Past Surgical History:   Procedure Laterality Date    BREAST BIOPSY Right     BUNIONECTOMY Bilateral     x4    CATARACT EXTRACTION EXTRACAPSULAR W/ INTRAOCULAR LENS IMPLANTATION Bilateral     CHOLECYSTECTOMY      CORONARY ANGIOPLASTY WITH STENT PLACEMENT      EYE SURGERY Bilateral     CATARACTS REMOVED    KNEE ARTHROSCOPY Right 2021    LUMBAR LAMINECTOMY WITH FUSION N/A 5/2/2019    Procedure: LUMBAR FUSION DECOMPRESSON WITH PEDICLE SCREWS L4-5, LAMINECTOMY, FORAMINOTOMY RIGHT L3-4;  Surgeon: Yoni Borja MD;  Location:  [facility];  Service: Neurosurgery    LUMBAR LAMINECTOMY WITH FUSION N/A 1/3/2022    Procedure: LUMBAR FUSION extension DECOMPRESSON WITH PEDICLE SCREWS L3-4, with exploration L2-3, left, discectomy;  Surgeon: Yoni Borja MD;  Location:  [facility];  Service: Neurosurgery;  Laterality: N/A;    LUMBAR LAMINECTOMY WITH FUSION N/A 1/17/2025    Procedure: TRANSFORAMINAL LUMBAR INTERBODY FUSION  WIHT PEDICLE SCREWS L2-3, REMOVAL OF L5 HARDWARE;  Surgeon: Yoni Borja MD;  [facility];  Service: Neurosurgery;  Laterality: N/A;       Social History     Socioeconomic History    Marital status:      Spouse name: Claude   Tobacco Use    Smoking status: Never     Passive exposure: Never    Smokeless tobacco: Never   Vaping Use    Vaping status: Never Used   Substance and Sexual Activity    Alcohol use: No    Drug use: No    Sexual activity: Defer         Review of Systems   Constitutional:  Negative for activity change, appetite change, chills, diaphoresis, fatigue, fever and unexpected weight change.   HENT:  Negative for congestion, dental problem, drooling, ear discharge, ear pain, facial swelling, hearing loss, mouth sores, nosebleeds, postnasal drip, rhinorrhea, sinus pressure, sinus pain, sneezing, sore throat, tinnitus, trouble swallowing and voice change.    Eyes:  Negative for photophobia, pain, discharge, redness, itching and visual disturbance.   Respiratory:  Negative for apnea, cough, choking, chest tightness, shortness of breath, wheezing and stridor.    Cardiovascular:  Negative for chest pain, palpitations and leg swelling.   Gastrointestinal:  Negative for abdominal distention, abdominal pain, anal bleeding, blood in stool, constipation, diarrhea, nausea, rectal pain and vomiting.   Endocrine: Negative for cold intolerance, heat intolerance, polydipsia, polyphagia and polyuria.   Genitourinary:  Negative for decreased urine volume, difficulty urinating, dyspareunia, dysuria, enuresis, flank pain, frequency, genital sores, hematuria, menstrual problem, pelvic pain, urgency, vaginal bleeding, vaginal discharge and vaginal pain.   Musculoskeletal:  Positive for back pain. Negative for arthralgias, gait problem, joint swelling, myalgias, neck pain and neck stiffness.   Skin:  Positive for wound. Negative for color change, pallor and rash.   Allergic/Immunologic: Negative for  "environmental allergies, food allergies and immunocompromised state.   Neurological:  Negative for dizziness, tremors, seizures, syncope, facial asymmetry, speech difficulty, weakness, light-headedness, numbness and headaches.   Hematological:  Negative for adenopathy. Does not bruise/bleed easily.   Psychiatric/Behavioral:  Negative for agitation, behavioral problems, confusion, decreased concentration, dysphoric mood, hallucinations, self-injury, sleep disturbance and suicidal ideas. The patient is not nervous/anxious and is not hyperactive.        Objective   Vital Signs: Temperature 97.6 °F (36.4 °C), temperature source Infrared, height 160 cm (62.99\"), weight 82.3 kg (181 lb 6.4 oz), not currently breastfeeding.  Physical Exam  Skin:     Comments: Incision has healed nicely.      Plain films of the lumbar spine were reviewed with patient in the room today.  This demonstrates construct intact L2-4 with removal of pedicle screws at L5    Assessment & Plan   Diagnosis:   L2-3 stenosis and instability status post decompression and fusion extension from prior L3-5 construct  Small intraoperative dural rent    Medical Decision Making: I reassured patient.  She has good days and bad but overall she is better than she was prior to surgery.  She needs more time.  She's moving slowly through her daily activities and trying to do some exercises when she feels up to it. Follow up with Dr Borja in 6 weeks. Call the office in the interim if symptoms progress.         Diagnoses and all orders for this visit:    1. Spinal stenosis of lumbar region with neurogenic claudication  -     traMADol (ULTRAM) 50 MG tablet; Take 1 tablet by mouth Every 6 (Six) Hours As Needed for Moderate Pain.  Dispense: 40 tablet; Refill: 0                                  Ave Chanel PA-C  Patient Care Team:  Regan Delgado MD as PCP - General (Internal Medicine)  Cari Rodriguez MD as Referring Physician (Internal Medicine)  Jonh, " Yoni REINA MD as Consulting Physician (Neurosurgery)  Cesar Funez MD as Consulting Physician (Cardiology)

## 2025-03-18 NOTE — TELEPHONE ENCOUNTER
Emergency Department Note      History of Present Illness     Chief Complaint   Fall and Seizures    HPI   Elsa Zapien is a 71 year old female with a history of Alzheimer's disease, seizures, type 2 diabetes mellitus, and hypertension presenting to the ED for evaluation following a fall resulting from a seizure. Her daughter and granddaughter arrived near the end of the patient encounter and contributed to the history. The patient reports that she went outside to her garage to smoke around [id number] yesterday. This was the last thing she remembers before waking up on the garage floor and calling her daughter using her medical alert button. Elsa reports recent trouble with her wrists and knees and therefore is unsure whether it was this or a seizure that caused her fall. However, upon consulting with her daughter, it is clear that Elsa has a history of regular seizures occurring every 2-3 weeks and that this is likely what led to her fall today. Since the event, Elsa endorses pain in her forehead, especially above her left eye. She denies any recent cough, fever, or cold symptoms. Regarding her seizure history, the patient's daughter states that she has been taking all of her seizure medications and denies any recent changes to these. She is scheduled to see a specialist today (3/18/25) to discuss her seizures. Since Elsa has seizures relatively often, her daughter notes that their primary concern for her ED visit is her head trauma sustained upon falling.    Independent Historian   Daughter as detailed above.    Past Medical History     Medical History and Problem List   Abdominal wall abscess  Alzheimer's disease  Arm fracture  Bipolar 2 disorder  Cataract  Carpal tunnel syndrome  Choledocholithiasis  Cluster B personality disorder  Drug-induced mood disorder  Frequent falls  GERD  Glaucoma  Hepatitis  Per Vilma- Medication needs to be sent to pharmacy for contrast allergy. Myelogram is scheduled for 6/11/24    Meds placed.           "C  Hyperammonemia  Hypertension  Hypokalemia  MDD  Myopia  Obesity  Pancreatitis  Pneumonia  Pseudophakia  Polysubstance dependence  Seizure disorder  Subconjunctival hemorrhage  TBI  Thyroid nodule  Tobacco use disorder  Tongue ulcer  Type 2 diabetes mellitus  Vertebra fracture    Medications   Acetazolamide  Atorvastatin  Amlodipine  Cyclobenzaprine  Famotidine  Gabapentin  Glyburide  Januvia  Lisinopril  Meloxicam  Metformin  Olanzapine  Phenytoin  Prednisone  Ranitidine  Simethicone  Trazodone  Zonisamide    Surgical History   Cataract extraction  Ahmed valve insertion  Elbow surgery  Sphincterotomy  Endoscopic Retrograde Cholangiopancreatography  Cholecystectomy  Liver biopsy  Diode cyclo photocoagulation    Physical Exam     Physical Exam   General: Patient is alert, awake and interactive when I enter the room  Head: The scalp, face, and head appear normal. Atraumatic.   Eyes: The pupils are equal, round, and reactive to light. Conjunctivae and sclerae are normal.  Extraocular motion is intact.  ENT: Hematoma surrounding the left orbit.  Neck: Normal range of motion. No cervical midline tenderness.   CV: Regular rate. S1/S2. No murmurs.   Resp: Lungs are clear without wheezes or rales. No distress. No crepitance.   GI: Abdomen is soft, no rigidity, guarding, or rebound. No contusion. No distension. No tenderness to palpation in any quadrant.     MS: Normal tone. Joints grossly normal without effusions. No asymmetric leg swelling, calf or thigh tenderness. Normal motor assessment of all extremities. PROM performed of all major joints without pain. No C/T/L tenderness in midline  Skin: No rash or lesions noted. Normal capillary refill noted  Neuro: Baseline dementia.  CN\"s II-XII intact. Speech is normal and fluent. Face is symmetric. Strength is normal and symmetric.   Psych: Normal affect.  Appropriate interactions.     Diagnostics     Lab Results   Labs Ordered and Resulted from Time of ED Arrival to Time of " ED Departure   BASIC METABOLIC PANEL - Abnormal       Result Value    Sodium 132 (*)     Potassium 4.1      Chloride 103      Carbon Dioxide (CO2) 22      Anion Gap 7      Urea Nitrogen 15.1      Creatinine 0.64      GFR Estimate >90      Calcium 8.6 (*)     Glucose 236 (*)    ROUTINE UA WITH MICROSCOPIC REFLEX TO CULTURE - Abnormal    Color Urine Yellow      Appearance Urine Slightly Cloudy (*)     Glucose Urine Negative      Bilirubin Urine Negative      Ketones Urine Negative      Specific Gravity Urine 1.023      Blood Urine Moderate (*)     pH Urine 6.5      Protein Albumin Urine 70 (*)     Urobilinogen Urine 2.0      Nitrite Urine Negative      Leukocyte Esterase Urine Small (*)     Bacteria Urine Many (*)     Mucus Urine Present (*)     RBC Urine 11 (*)     WBC Urine 15 (*)     Squamous Epithelials Urine 5 (*)    TROPONIN T, HIGH SENSITIVITY - Abnormal    Troponin T, High Sensitivity 18 (*)    CBC WITH PLATELETS AND DIFFERENTIAL - Abnormal    WBC Count 7.7      RBC Count 3.59 (*)     Hemoglobin 11.0 (*)     Hematocrit 33.4 (*)     MCV 93      MCH 30.6      MCHC 32.9      RDW 13.2      Platelet Count 229      % Neutrophils 72      % Lymphocytes 23      % Monocytes 5      % Eosinophils 1      % Basophils 0      % Immature Granulocytes 1      NRBCs per 100 WBC 0      Absolute Neutrophils 5.6      Absolute Lymphocytes 1.7      Absolute Monocytes 0.4      Absolute Eosinophils 0.0      Absolute Basophils 0.0      Absolute Immature Granulocytes 0.0      Absolute NRBCs 0.0     TROPONIN T, HIGH SENSITIVITY - Abnormal    Troponin T, High Sensitivity 16 (*)    MAGNESIUM - Normal    Magnesium 1.9     PHENYTOIN LEVEL - Normal    Phenytoin 12.0     INFLUENZA A/B, RSV AND SARS-COV2 PCR - Normal    Influenza A PCR Negative      Influenza B PCR Negative      RSV PCR Negative      SARS CoV2 PCR Negative     ZONISAMIDE LEVEL QUANTITATIVE   URINE CULTURE       Imaging   XR Chest 2 Views   Final Result   IMPRESSION: Negative  chest.      CT Facial Bones without Contrast   Final Result   IMPRESSION:   HEAD CT:   1.  No CT evidence for acute intracranial process.   2.  Brain atrophy and presumed chronic microvascular ischemic changes as above.      FACIAL BONE CT:   1. No facial bone or mandibular fracture.         Head CT w/o contrast   Final Result   IMPRESSION:   HEAD CT:   1.  No CT evidence for acute intracranial process.   2.  Brain atrophy and presumed chronic microvascular ischemic changes as above.      FACIAL BONE CT:   1. No facial bone or mandibular fracture.             EKG   ECG results from 03/18/25   EKG 12-lead, tracing only     Value    Systolic Blood Pressure     Diastolic Blood Pressure     Ventricular Rate 72    Atrial Rate 72    WI Interval 172    QRS Duration 96        QTc 440    P Axis 20    R AXIS -31    T Axis 44    Interpretation ECG      Sinus rhythm  Left axis deviation  Moderate voltage criteria for LVH, may be normal variant ( R in aVL , Kenney product )  Abnormal ECG    ECG performed at 0220, interpreted by me at 0223.     ED Course      Medications Administered   Medications   lidocaine 1 % 0.1-1 mL (has no administration in time range)   lidocaine (LMX4) cream (has no administration in time range)   sodium chloride (PF) 0.9% PF flush 3 mL (has no administration in time range)   sodium chloride (PF) 0.9% PF flush 3 mL (has no administration in time range)   senna-docusate (SENOKOT-S/PERICOLACE) 8.6-50 MG per tablet 1 tablet (has no administration in time range)     Or   senna-docusate (SENOKOT-S/PERICOLACE) 8.6-50 MG per tablet 2 tablet (has no administration in time range)   calcium carbonate (TUMS) chewable tablet 1,000 mg (has no administration in time range)   levETIRAcetam (KEPPRA) intermittent infusion 500 mg (has no administration in time range)   acetaminophen (TYLENOL) tablet 650 mg (has no administration in time range)   oxyCODONE (ROXICODONE) tablet 5 mg (has no administration in time  range)   acetaminophen (TYLENOL) tablet 975 mg (975 mg Oral $Given 3/18/25 0159)   oxyCODONE (ROXICODONE) tablet 10 mg (10 mg Oral $Given 3/18/25 0405)   levETIRAcetam (KEPPRA) intermittent infusion 1,000 mg (1,000 mg Intravenous $New Bag 3/18/25 4156)       Procedures   Procedures     Discussion of Management   Admitting Hospitalist, Dr. Trujillo    ED Course   ED Course as of 03/18/25 0631   Tue Mar 18, 2025   0140 I obtained the history and examined the patient as noted above.      0427 I rechecked and updated the patient regarding results at this time.   0457 The patient passed her ambulation trial.   0514 I rechecked the patient as I was informed of the patient having another seizure occurring. She is going to be admitted to the hospital.   0531 I spoke to Dr. Trujillo of the hospitalist service who accepts the patient for admission.           Medical Decision Making / Diagnosis     CMS Diagnoses: None    MIPS       None    [facility]   Elsa Zapien is a 71 year old female with past medical history of Alzheimer's disease, seizure, type 2 diabetes and hypertension who presents to the emergency department with facial trauma after a seizure.  Patient's granddaughter explains that the patient went out to smoke and suffered a seizure and was found down on the ground in a postictal state.  Upon initial evaluation here she is awake and alert and is at her mental baseline according to family.  She has tenderness and ecchymosis surrounding the left eye and some chronic aches and pains.  She otherwise has been well recently without any cough, fever or additional concerns.  They are scheduled to meet with neurologist later on today as the patient has had some more frequent seizures.  Daughter reports that patient has been compliant with her seizure medications.  No other recent medication changes.  On initial evaluation here she is hypertensive but otherwise hemodynamically stable.  She is afebrile and oxygenating well on room air.   Currently is at her mental baseline.  Physical exam detailed above.  CT of the head and facial bones was obtained which was thankfully negative for any significant traumatic pathology.  The remainder of her workup was fairly bland.  Urine questionable for infection.  Will hold off on antibiotics at this time.  Will send for culture.  Chest x-ray negative for pneumonia.  COVID, influenza and RSV testing negative.  No significant electrolyte abnormalities seen here today.  Antiepileptic blood levels were obtained and sent.  Patient was doing quite well and passed her ambulation trial and we were making her ready to go home when she suffered a additional seizure in the emergency department.  Seizure manifested as staring off followed by postictal period which daughter note was typical for her.  Will load her with Keppra here in the emergency department and admit due to increasing frequency of seizures.    Disposition   The patient was admitted to the hospital.    Diagnosis     ICD-10-CM    1. Seizure (H)  R56.9       2. Orbital contusion, left, initial encounter  S05.12XA       3. Fall, initial encounter  W19.XXXA            Scribe Disclosure:  Mikki SAN, am serving as a scribe at 1:39 AM on 3/18/2025 to document services personally performed by José Antonio Stratton MD based on my observations and the provider's statements to me.        José Antonio Stratton MD  03/18/25 4527

## 2025-04-11 ENCOUNTER — OFFICE VISIT (OUTPATIENT)
Dept: NEUROSURGERY | Facility: CLINIC | Age: 75
End: 2025-04-11
Payer: MEDICARE

## 2025-04-11 VITALS — WEIGHT: 180.8 LBS | TEMPERATURE: 98.5 F | HEIGHT: 63 IN | BODY MASS INDEX: 32.04 KG/M2

## 2025-04-11 DIAGNOSIS — M48.062 SPINAL STENOSIS, LUMBAR REGION, WITH NEUROGENIC CLAUDICATION: ICD-10-CM

## 2025-04-11 DIAGNOSIS — Z98.1 S/P LUMBAR FUSION: Primary | ICD-10-CM

## 2025-04-11 PROCEDURE — 1159F MED LIST DOCD IN RCRD: CPT | Performed by: NEUROLOGICAL SURGERY

## 2025-04-11 PROCEDURE — 99024 POSTOP FOLLOW-UP VISIT: CPT | Performed by: NEUROLOGICAL SURGERY

## 2025-04-11 PROCEDURE — 1160F RVW MEDS BY RX/DR IN RCRD: CPT | Performed by: NEUROLOGICAL SURGERY

## 2025-04-11 NOTE — PROGRESS NOTES
Patient: Eneida Bartholomew  : 1950    Primary Care Provider: Regan Delgado MD    Requesting Provider: As above        History    Chief Complaint: Low back pain with walking and standing intolerance.    History of Present Illness: Ms. Morales is a 74-year-old woman who presented with progressive back and bilateral lower extremity pain. A number of years ago she underwent L4-5 fusion and did well. In  her fusion construct was extended to L3-4. In general she has done well until recent years when she developed recurrent neurogenic claudication. Studies demonstrated stenosis and evidence of instability at L2-3. As such on 2025 she underwent fusion extension with removal of old pedicle screw hardware at L5. A small dural rent was encountered intraoperatively and patient was kept flat for several days.  She is better than before surgery and even better than 6 weeks ago but she still quite sore in her back.  She gets periodic stabbing pain in her back.  She takes baclofen 3 times a day which is helpful.  She uses a rolling walker at home.    Review of Systems   Constitutional:  Negative for activity change, appetite change, chills, diaphoresis, fatigue, fever and unexpected weight change.   HENT:  Negative for congestion, dental problem, drooling, ear discharge, ear pain, facial swelling, hearing loss, mouth sores, nosebleeds, postnasal drip, rhinorrhea, sinus pressure, sinus pain, sneezing, sore throat, tinnitus, trouble swallowing and voice change.    Eyes:  Negative for photophobia, pain, discharge, redness, itching and visual disturbance.   Respiratory:  Negative for apnea, cough, choking, chest tightness, shortness of breath, wheezing and stridor.    Cardiovascular:  Negative for chest pain, palpitations and leg swelling.   Gastrointestinal:  Negative for abdominal distention, abdominal pain, anal bleeding, blood in stool, constipation, diarrhea, nausea, rectal pain and vomiting.   Endocrine:  "Negative for cold intolerance, heat intolerance, polydipsia, polyphagia and polyuria.   Genitourinary:  Negative for decreased urine volume, difficulty urinating, dysuria, enuresis, flank pain, frequency, genital sores, hematuria and urgency.   Musculoskeletal:  Negative for arthralgias, back pain, gait problem, joint swelling, myalgias, neck pain and neck stiffness.   Skin:  Negative for color change, pallor, rash and wound.   Allergic/Immunologic: Negative for environmental allergies, food allergies and immunocompromised state.   Neurological:  Negative for dizziness, tremors, seizures, syncope, facial asymmetry, speech difficulty, weakness, light-headedness, numbness and headaches.   Hematological:  Negative for adenopathy. Does not bruise/bleed easily.   Psychiatric/Behavioral:  Negative for agitation, behavioral problems, confusion, decreased concentration, dysphoric mood, hallucinations, self-injury, sleep disturbance and suicidal ideas. The patient is not nervous/anxious and is not hyperactive.        The patient's past medical history, past surgical history, family history, and social history have been reviewed at length in the electronic medical record.      Physical Exam:   Temp 98.5 °F (36.9 °C) (Temporal)   Ht 160 cm (63\")   Wt 82 kg (180 lb 12.8 oz)   BMI 32.03 kg/m²   She ambulates in a bit of a tilted fashion but independently.    Medical Decision Making    Data Review:   (All imaging studies were personally reviewed unless stated otherwise)  Plain films of the lumbar spine dated 2/18/2025 demonstrate her construct to be in place from L2-L4 with prior solid interbody fusion at L4-5.    Diagnosis:   Lumbar transition syndrome with stenosis instability status post fusion extension to the L2-3 level.    Treatment Options:   Overall the patient is making progress.  She is not at a point where she can undergo physical therapy due to some other circumstances.  She will follow-up with our clinic in 3 " months with new plain films of her lumbar spine.  At her request I have ordered a lumbar corset type of brace.      Scribed for Yoni Borja MD by Vernell Becker CMA on 4/11/2025 11:53 EDT       I, Dr. Borja, personally performed the services described in the documentation, as scribed in my presence, and it is both accurate and complete.

## 2025-06-12 ENCOUNTER — TELEPHONE (OUTPATIENT)
Dept: NEUROSURGERY | Facility: CLINIC | Age: 75
End: 2025-06-12
Payer: MEDICARE

## 2025-06-12 NOTE — TELEPHONE ENCOUNTER
Provider:  Jonh  Surgery/Procedure:  TRANSFORAMINAL LUMBAR INTERBODY FUSION WIHT PEDICLE SCREWS L2-3, REMOVAL OF L5 HARDWARE   Surgery/Procedure Date:  1-1725  Last visit:   Office Visit with Yoni Borja MD (04/11/2025)   Next visit: 7-8-25     Reason for call:  Patient called and stated that she was having sharp pain down her left leg and she felt a lump by her surgery site. Her legs shake  at times and that is when if feels like an electric torch touches her.   She has low back pain and left leg pain  Her leg pain is the worse  Name and Age:  Eneida  Provider: Jonh    Previously seen for (Diagnosis):S/P lumbar fusion   Last Visit Date(with what provider?):  4-11-25 Jonh  Next Visit:Appointment with Roxana Lau PA-C (07/08/2025)     Surgery Date: 1-  Surgery:TRANSFORAMINAL LUMBAR INTERBODY FUSION WIHT PEDICLE SCREWS L2-3, REMOVAL OF L5 HARDWARE     When did symptoms(pain, numbness, tingling, weakness?) start : last Saturday    Recent injury, accident, trauma?   nothing    Where is it located/radiate: low back down her left outside leg to her foot with foot cramps    How long has this been going on: 6 days    Is this new or the same as before surgery: NEW     Characteristics of symptom/severity: feels like a stabbing from a electric torch,6 days    POST OP? Fever, chills, purulent drainage, redness, swelling, worsening pain?:  NONE    Is it constant or intermittent:intermittent    What makes it worse: nothing really    What makes it better: sitting in a recliner    What therapies/medications have you tried: ibuprofen 200 mg 4 at bedtime  Baclofen 10 MG 4 times a day per her PCP    Most recent imaging:XR Spine Lumbar AP & Lateral (02/18/2025 10:13)     Red flag sxs: weakness, acute foot drop, bladder issues, saddle paresthesia?:

## 2025-06-12 NOTE — TELEPHONE ENCOUNTER
We should have the patient come in a little early with her x-rays.    We should move that up to tomorrow if possible to get the patient's questions answered.  Patient should have x-rays prior to showing up

## 2025-06-13 NOTE — TELEPHONE ENCOUNTER
Dr. Borja is in surgery this morning and Roxana has a full schedule. We have messaged her to advise us on how she wants this scheduled.

## 2025-06-13 NOTE — TELEPHONE ENCOUNTER
Per message received from Judi SALAZAR, patient can follow up next week with x-rays. I spoke to patient. Appointment was moved up to next week. Advised to complete x-rays.

## 2025-06-16 ENCOUNTER — HOSPITAL ENCOUNTER (OUTPATIENT)
Dept: GENERAL RADIOLOGY | Facility: HOSPITAL | Age: 75
Discharge: HOME | End: 2025-06-16
Admitting: NEUROLOGICAL SURGERY
Payer: MEDICARE

## 2025-06-16 DIAGNOSIS — Z98.1 S/P LUMBAR FUSION: ICD-10-CM

## 2025-06-16 PROCEDURE — 72100 X-RAY EXAM L-S SPINE 2/3 VWS: CPT

## 2025-06-20 ENCOUNTER — OFFICE VISIT (OUTPATIENT)
Dept: NEUROSURGERY | Facility: CLINIC | Age: 75
End: 2025-06-20
Payer: MEDICARE

## 2025-06-20 VITALS
BODY MASS INDEX: 32.3 KG/M2 | WEIGHT: 182.3 LBS | DIASTOLIC BLOOD PRESSURE: 78 MMHG | SYSTOLIC BLOOD PRESSURE: 138 MMHG | TEMPERATURE: 97.3 F | HEIGHT: 63 IN

## 2025-06-20 DIAGNOSIS — Z98.1 S/P LUMBAR FUSION: ICD-10-CM

## 2025-06-20 DIAGNOSIS — M48.062 SPINAL STENOSIS, LUMBAR REGION, WITH NEUROGENIC CLAUDICATION: Primary | ICD-10-CM

## 2025-06-20 RX ORDER — OXYBUTYNIN CHLORIDE 15 MG/1
15 TABLET, EXTENDED RELEASE ORAL DAILY
COMMUNITY
Start: 2025-04-03

## 2025-06-20 RX ORDER — ACETAMINOPHEN AND CODEINE PHOSPHATE 300; 30 MG/1; MG/1
TABLET ORAL
COMMUNITY

## 2025-06-20 NOTE — PROGRESS NOTES
"  Patient: Eneida Bartholomew  : 1950  Chart #: [id number]    Date of Service: 2025    Chief complaint: Follow-up, increased low back and left lower extremity pain    HPI: Ms. Morales is a 74-year-old woman who presented with progressive back and bilateral lower extremity pain. A number of years ago she underwent L4-5 fusion and did well. In  her fusion construct was extended to L3-4. In general she has done well until recent years when she developed recurrent neurogenic claudication. Studies demonstrated stenosis and evidence of instability at L2-3. As such on 2025 she underwent fusion extension with removal of old pedicle screw hardware at L5. A small dural rent was encountered intraoperatively and patient was kept flat for several days.  She was scheduled to see me in follow-up in about 3 weeks with follow-up plain films of her lumbar spine, but recently phoned our office for an increase in her back pain and left leg pain.  Upon my evaluation and interview today, she reports that the back pain is pretty constant.  She reports times of \"leg shakiness\" in bilateral legs when she gets up to walk or stand.  This is not constantly occurring.  She does occasionally get pain that extends from her left hip into her left thigh, this was constant prior to surgery.  She is taking baclofen 4 times a day right now.  She does have a long history of knee and foot difficulties that also alter her gait.      BMI is >= 30 and <35. (Class 1 Obesity). The following options were offered after discussion;: exercise counseling/recommendations       Review of Systems   Constitutional:  Negative for activity change, appetite change, chills, diaphoresis, fatigue, fever and unexpected weight change.   HENT:  Negative for congestion, dental problem, drooling, ear discharge, ear pain, facial swelling, hearing loss, mouth sores, nosebleeds, postnasal drip, rhinorrhea, sinus pressure, sinus pain, sneezing, sore throat, " "tinnitus, trouble swallowing and voice change.    Eyes:  Negative for photophobia, pain, discharge, redness, itching and visual disturbance.   Respiratory:  Negative for apnea, cough, choking, chest tightness, shortness of breath, wheezing and stridor.    Cardiovascular:  Negative for chest pain, palpitations and leg swelling.   Gastrointestinal:  Negative for abdominal distention, abdominal pain, anal bleeding, blood in stool, constipation, diarrhea, nausea, rectal pain and vomiting.   Endocrine: Negative for cold intolerance, heat intolerance, polydipsia, polyphagia and polyuria.   Genitourinary:  Negative for decreased urine volume, difficulty urinating, dysuria, enuresis, flank pain, frequency, genital sores, hematuria and urgency.   Musculoskeletal:  Positive for back pain. Negative for arthralgias, gait problem, joint swelling, myalgias, neck pain and neck stiffness.   Skin:  Negative for color change, pallor, rash and wound.   Allergic/Immunologic: Negative for environmental allergies, food allergies and immunocompromised state.   Neurological:  Positive for weakness (bilateral leg). Negative for dizziness, tremors, seizures, syncope, facial asymmetry, speech difficulty, light-headedness, numbness and headaches.   Hematological:  Negative for adenopathy. Does not bruise/bleed easily.   Psychiatric/Behavioral:  Negative for agitation, behavioral problems, confusion, decreased concentration, dysphoric mood, hallucinations, self-injury, sleep disturbance and suicidal ideas. The patient is not nervous/anxious and is not hyperactive.         The patient's past medical history, past surgical history, family history, and social history have been reviewed at length in the electronic medical record.    Physical examination:  Blood pressure 138/78, temperature 97.3 °F (36.3 °C), temperature source Infrared, height 160 cm (63\"), weight 82.7 kg (182 lb 4.8 oz), not currently breastfeeding.    Constitutional: The patient " is well-developed, well-nourished.  She is in no acute distress.  She is able to walk, though she is rather stooped over and tilted.    HEENT: normocephalic, atraumatic, sclera clear    MSK: Right leg testing is negative.  Pradeep signs are negative.  Tenderness in the midline lumbar spine.    Neurological Exam   A/A/C, speech clear, attention normal, conversant, answers questions appropriately, good historian.  Motor examination does not reveal weakness i lumbar the , upper or lower extremities.   Sensation is intact.  Balance not assessed  No tremors are noted.  Reflexes are intact.   Herrera is negative. Clonus is negative.     Radiographic Imaging:  For my review plain films of the lumbar spine dated 6/16/2025 demonstrate plate hardware spanning L2-4.  Evidence of previous hardware at the L4-5 level.  Hardware is well-placed.  No perihardware lucency or evidence of malfunction.    Medical Decision Making  Diagnosis:  1.  Lumbar transition syndrome with stenosis/instability-s/p decompression and fusion extension to the L2-3 level  2.  Mechanical back pain  3.  General Deconditioning    Diagnoses and all orders for this visit:    1. Spinal stenosis, lumbar region, with neurogenic claudication (Primary)  -     Ambulatory Referral to Physical Therapy for Evaluation & Treatment    2. S/P lumbar fusion    I think the patient would benefit from physical therapy to work on strengthening her legs.  She describes an inconsistent shaking in them.  Her recent plain films suggest that her hardware is in good positioning.  She will follow-up with me in 6-8 weeks to assess her progress with PT.  We discussed new or worsening symptoms she should make our office aware of.  The patient and I discussed the improvements that have been made compared to her preoperative symptoms.    Any copied data from previous notes included in the (1) HPI, (2) PE, (3) MDM and/or assessment and plan has been reviewed and is accurate as of this  day.    Roxana Lau PA-C     Patient Care Team:  Regan Delgado MD as PCP - General (Internal Medicine)  Cari Rodriguez MD as Referring Physician (Internal Medicine)  Yoni Borja MD as Consulting Physician (Neurosurgery)  Cesar Funez MD as Consulting Physician (Cardiology)

## 2025-06-25 ENCOUNTER — TELEPHONE (OUTPATIENT)
Dept: CARDIOLOGY | Facility: CLINIC | Age: 75
End: 2025-06-25
Payer: MEDICARE

## 2025-06-30 NOTE — PROGRESS NOTES
Subjective:     Encounter Date:07/01/2025    Primary Care Physician: Regan Delgado MD      Patient ID: Eneida Bartholomew is a 74 y.o. female.    Chief Complaint:Follow-up and Coronary Artery Disease      PROBLEM LIST:  Coronary artery disease:  2005, abnormal Cardiolite.  Cardiac catheterization, Dr. Daniel with 75% to 80% mid LAD, 20% proximal circumflex, 25% proximal RCA with a small AV fistula from the first diagonal to the coronary sinus.  2005, LAD stenting with a 3.0 x 28 mm Cypher JORDYN by Dr. Tyson Pulliam.  10/14/2020 [facility] for high risk myocardial perfusion study.  Dr. Vidales.  Patent stent in the LAD.  Diagonal free of disease.  Circumflex normal.  RCA 40 to 50% proximal stenosis, normal IFR.  11/26/2024 normal MPS  Hypertension.  Echo, 12/2014 with an EF of 65% to 70%, mild tricuspid regurgitation and mild concentric LVH.    Dyslipidemia.  COVID-19 9/2021  Did not require hospitalization.  Received infusion therapy.  RBBB  GERD.  History of peptic ulcer disease.  Overactive bladder.  Arthritis.  Anxiety.  Fibromyalgia.  Diverticulosis  Surgeries:  Cholecystectomy.  Right foot surgery x5.  Left foot surgery x3.  Remote lumpectomy, reportedly benign.  Cataract extraction  Lumbar laminectomy   Knee surgery  Back surgery      Allergies   Allergen Reactions    Other Shortness Of Breath     Lavendar scent caused SOA and swelling of throat    Prednisone Shortness Of Breath     And nervous    Shellfish-Derived Products Anaphylaxis, Rash, Shortness Of Breath and Swelling     ALL SEAFOOD, FISH    Iodinated Contrast Media Other (See Comments)     Other reaction(s): Feels unwell, Hypotension    Erythromycin Rash    Gabapentin Unknown - Low Severity     STRANGE FEELING    Sulfa Antibiotics Rash         Current Outpatient Medications:     acetaminophen-codeine (TYLENOL with CODEINE #3) 300-30 MG per tablet, Take 1 tablet every 6 hours by oral route as directed. (Patient taking differently: Take 1 tablet by  mouth. PRN), Disp: , Rfl:     amLODIPine (NORVASC) 10 MG tablet, Take 1 tablet by mouth Daily., Disp: , Rfl:     aspirin 81 MG EC tablet, Take 1 tablet by mouth Daily., Disp: , Rfl:     baclofen (LIORESAL) 10 MG tablet, 1 tablet 3 (Three) Times a Day As Needed. (Patient taking differently: 1 tablet 3 (Three) Times a Day As Needed. PRN), Disp: , Rfl:     benazepril (LOTENSIN) 40 MG tablet, Take 1 tablet by mouth Daily., Disp: , Rfl:     fluticasone (FLONASE) 50 MCG/ACT nasal spray, 1 spray., Disp: , Rfl:     hydroCHLOROthiazide (HYDRODIURIL) 25 MG tablet, Take 1 tablet by mouth Daily., Disp: , Rfl:     latanoprost (XALATAN) 0.005 % ophthalmic solution, , Disp: , Rfl:     metoprolol succinate XL (TOPROL-XL) 100 MG 24 hr tablet, 1 tablet Daily., Disp: , Rfl:     omeprazole (priLOSEC) 40 MG capsule, 1 capsule., Disp: , Rfl:     oxybutynin XL (DITROPAN XL) 15 MG 24 hr tablet, Take 1 tablet by mouth Daily. (Patient taking differently: Take 10 mg by mouth Daily. 10 mg daily.), Disp: , Rfl:     rosuvastatin (CRESTOR) 40 MG tablet, Take 1 tablet by mouth Every Night., Disp: , Rfl:     ciprofloxacin (CIPRO) 500 MG tablet, , Disp: , Rfl:     Flaxseed, Linseed, (FLAX SEED OIL PO), Take  by mouth. (Patient not taking: Reported on 6/25/2025), Disp: , Rfl:     Multiple Vitamins-Minerals (MULTIVITAL PO), Take 1 tablet by mouth Daily. (Patient not taking: Reported on 7/1/2025), Disp: , Rfl:     traMADol (ULTRAM) 50 MG tablet, Take 1 tablet by mouth Every 6 (Six) Hours As Needed for Moderate Pain. (Patient not taking: Reported on 7/1/2025), Disp: 40 tablet, Rfl: 0  No current facility-administered medications for this visit.    Facility-Administered Medications Ordered in Other Visits:     mupirocin (BACTROBAN) 2 % nasal ointment, , Nasal, BID, Jonh, Yoni P, MD        History of Present Illness    Patient returns today for annual follow-up of coronary artery disease and risk factors.  Since her last visit, her primary issue has  "been limitations from her back surgery.  No chest pain dyspnea orthopnea PND.  Does have some evening pedal edema which is resolved by morning.    The following portions of the patient's history were reviewed and updated as appropriate: allergies, current medications, past family history, past medical history, past social history, past surgical history and problem list.      Social History     Tobacco Use    Smoking status: Never     Passive exposure: Never    Smokeless tobacco: Never   Vaping Use    Vaping status: Never Used   Substance Use Topics    Alcohol use: No    Drug use: No         ROS       Objective:   /70   Pulse 90   Ht 157.5 cm (62\")   Wt 81.7 kg (180 lb 3.2 oz)   SpO2 96%   BMI 32.96 kg/m²         Vitals reviewed.   Constitutional:       Appearance: Well-developed and not in distress.   Neck:      Thyroid: No thyromegaly.      Vascular: No carotid bruit or JVD.   Pulmonary:      Breath sounds: Normal breath sounds.   Cardiovascular:      Regular rhythm.      No gallop. No S3 and S4 gallop.   Pulses:     Intact distal pulses.      Carotid: 2+ bilaterally.     Radial: 2+ bilaterally.  Edema:     Peripheral edema absent.   Abdominal:      General: Bowel sounds are normal.      Palpations: Abdomen is soft. There is no abdominal mass.      Tenderness: There is no abdominal tenderness.   Musculoskeletal:         General: No deformity.      Extremities: No clubbing present.Skin:     General: Skin is warm and dry.      Findings: No rash.   Neurological:      Mental Status: Alert and oriented to person, place, and time.         Procedures          Assessment:   Assessment & Plan      Diagnoses and all orders for this visit:    1. Coronary artery disease involving native coronary artery of native heart without angina pectoris (Primary)      1.  Coronary artery disease, normal MPS last year.  No angina.  2.  Hypertension well-controlled on current medical regimen  3.  Dyslipidemia on high intensity " statin    Recommendations:  1.  Continue physical therapy/encourage rehab.  2.  No change in current medical therapy  3.  Revisit annually apparent symptom change           Advance Care Planning   ACP discussion was held with the patient during this visit. Patient has an advance directive in EMR which is still valid.         Dictated utilizing Dragon dictation

## 2025-07-01 ENCOUNTER — OFFICE VISIT (OUTPATIENT)
Dept: CARDIOLOGY | Facility: CLINIC | Age: 75
End: 2025-07-01
Payer: MEDICARE

## 2025-07-01 VITALS
HEART RATE: 90 BPM | WEIGHT: 180.2 LBS | SYSTOLIC BLOOD PRESSURE: 128 MMHG | DIASTOLIC BLOOD PRESSURE: 70 MMHG | HEIGHT: 62 IN | OXYGEN SATURATION: 96 % | BODY MASS INDEX: 33.16 KG/M2

## 2025-07-01 DIAGNOSIS — I25.10 CORONARY ARTERY DISEASE INVOLVING NATIVE CORONARY ARTERY OF NATIVE HEART WITHOUT ANGINA PECTORIS: Primary | ICD-10-CM

## (undated) DEVICE — DRSNG WND GZ PAD BORDERED 4X8IN STRL

## (undated) DEVICE — SHEET,DRAPE,40X58,STERILE: Brand: MEDLINE

## (undated) DEVICE — DRAPE,TOP,102X53,STERILE: Brand: MEDLINE

## (undated) DEVICE — PK NEURO DISC 10

## (undated) DEVICE — 3M™ WARMING BLANKET, UPPER BODY, 10 PER CASE, 42268: Brand: BAIR HUGGER™

## (undated) DEVICE — KT DRN EVAC WND PVC PCH WTROC RND 10F400

## (undated) DEVICE — IRRIGATOR BULB ASEPTO 60CC STRL

## (undated) DEVICE — SNAP KOVER: Brand: UNBRANDED

## (undated) DEVICE — ADHS LIQ MASTISOL 2/3ML

## (undated) DEVICE — ENCORE® LATEX MICRO SIZE 7.5, STERILE LATEX POWDER-FREE SURGICAL GLOVE: Brand: ENCORE

## (undated) DEVICE — SUT SILK 2/0 PS 18IN 1588H

## (undated) DEVICE — PENCL ROCKRSWCH MEGADYNE W/HOLSTR 10FT SS

## (undated) DEVICE — ANTIBACTERIAL UNDYED BRAIDED (POLYGLACTIN 910), SYNTHETIC ABSORBABLE SUTURE: Brand: COATED VICRYL

## (undated) DEVICE — JACKSON TABLE POSITIONER KIT: Brand: MEDLINE INDUSTRIES, INC.

## (undated) DEVICE — TOOL MR8-14MH30T MR8 14CM MH SYMTRI 3MM: Brand: MIDAS REX MR8

## (undated) DEVICE — THE MILL DISPOSABLE - FINE

## (undated) DEVICE — BLANKT WARM UPPR/BDY ARM/OUT 57X196CM

## (undated) DEVICE — CONN TBG Y 5 IN 1 LF STRL

## (undated) DEVICE — PACK,UNIVERSAL,NO GOWNS: Brand: MEDLINE

## (undated) DEVICE — ELECTRD BLD EXT EDGE/INSUL 1P 4IN

## (undated) DEVICE — 3M™ STERI-DRAPE™ INSTRUMENT POUCH 1018: Brand: STERI-DRAPE™

## (undated) DEVICE — TOOL 14MH30 LEGEND 14CM 3MM: Brand: MIDAS REX ™

## (undated) DEVICE — SYR LUERLOK 30CC

## (undated) DEVICE — SPHR MARKR STEALTH STATION

## (undated) DEVICE — STRAP POSTN KN/BDY FM 5X72IN DISP

## (undated) DEVICE — SUT VIC PLS CTD ANTIB BR 3/0 8/18IN 45CM

## (undated) DEVICE — SUT VIC 0 CTD BR 18IN UNDYE VCP724D

## (undated) DEVICE — DRP C/ARMOR

## (undated) DEVICE — DRP CLEARVIEWTEARAWAY O/ARM

## (undated) DEVICE — GLV SURG PREMIERPRO MIC LTX PF SZ7.5 BRN

## (undated) DEVICE — PATIENT RETURN ELECTRODE, SINGLE-USE, CONTACT QUALITY MONITORING, ADULT, WITH 9FT CORD, FOR PATIENTS WEIGING OVER 33LBS. (15KG): Brand: MEGADYNE

## (undated) DEVICE — GOWN,REINF,POLY,ECL,PP SLV,XL: Brand: MEDLINE

## (undated) DEVICE — ENCORE® LATEX MICRO SIZE 6.5, STERILE LATEX POWDER-FREE SURGICAL GLOVE: Brand: ENCORE

## (undated) DEVICE — SUT ETHLN 3/0 FS1 30IN 669H

## (undated) DEVICE — 3M™ STERI-STRIP™ REINFORCED ADHESIVE SKIN CLOSURES, R1547, 1/2 IN X 4 IN (12 MM X 100 MM), 6 STRIPS/ENVELOPE: Brand: 3M™ STERI-STRIP™

## (undated) DEVICE — CVR HNDL LT SURG ACCSSRY BLU STRL

## (undated) DEVICE — HOLDER: Brand: DEROYAL

## (undated) DEVICE — TRAP,MUCUS SPECIMEN,40CC: Brand: MEDLINE

## (undated) DEVICE — ACCY PA700 LUBRICANT DIFFUSER MR7 4 PACK: Brand: MIDAS REX

## (undated) DEVICE — GLV SURG PREMIERPRO MIC LTX PF SZ6.5 BRN

## (undated) DEVICE — PCH INST SURG INVISISHIELD 2PCKT

## (undated) DEVICE — Device

## (undated) DEVICE — TOOL MR8-14MH30 MR8 14CM MATCH 3MM: Brand: MIDAS REX MR8

## (undated) DEVICE — GOWN,REINFORCE,POLY,SIRUS,BREATH SLV,XLG: Brand: MEDLINE

## (undated) DEVICE — SUT PROLN 6/0 BV1 D/A 30IN 8709H

## (undated) DEVICE — ELECTRD BLD EZ CLN MOD XLNG 2.75IN

## (undated) DEVICE — STRIP,CLOSURE,WOUND,MEDI-STRIP,1/2X4: Brand: MEDLINE

## (undated) DEVICE — GLV SURG PREMIERPRO MIC LTX PF SZ7 BRN

## (undated) DEVICE — DRP CASSET 10 RAY LG 24X40

## (undated) DEVICE — ELECTRD BLD EZ CLN MOD 4IN